# Patient Record
Sex: FEMALE | Race: WHITE | NOT HISPANIC OR LATINO | Employment: OTHER | URBAN - METROPOLITAN AREA
[De-identification: names, ages, dates, MRNs, and addresses within clinical notes are randomized per-mention and may not be internally consistent; named-entity substitution may affect disease eponyms.]

---

## 2017-05-25 ENCOUNTER — OFFICE VISIT (OUTPATIENT)
Dept: OBGYN | Facility: CLINIC | Age: 47
End: 2017-05-25
Payer: COMMERCIAL

## 2017-05-25 VITALS
DIASTOLIC BLOOD PRESSURE: 88 MMHG | SYSTOLIC BLOOD PRESSURE: 139 MMHG | BODY MASS INDEX: 31.01 KG/M2 | WEIGHT: 198 LBS | OXYGEN SATURATION: 98 % | HEART RATE: 80 BPM

## 2017-05-25 DIAGNOSIS — N83.202 CYST OF LEFT OVARY: ICD-10-CM

## 2017-05-25 DIAGNOSIS — R10.2 PELVIC PAIN IN FEMALE: ICD-10-CM

## 2017-05-25 DIAGNOSIS — N64.4 MASTODYNIA: Primary | ICD-10-CM

## 2017-05-25 PROCEDURE — 99214 OFFICE O/P EST MOD 30 MIN: CPT | Performed by: OBSTETRICS & GYNECOLOGY

## 2017-05-25 NOTE — NURSING NOTE
"Chief Complaint   Patient presents with     Breast Pain     left breast pain  2 weeks     Pelvic Pain     for 2 weeks history of ovarian cysts       Initial /88 (BP Location: Right arm, Cuff Size: Adult Regular)  Pulse 80  Wt 198 lb (89.8 kg)  LMP 05/04/2017  SpO2 98%  Breastfeeding? No  BMI 31.01 kg/m2 Estimated body mass index is 31.01 kg/(m^2) as calculated from the following:    Height as of 3/5/16: 5' 7\" (1.702 m).    Weight as of this encounter: 198 lb (89.8 kg).  Medication Reconciliation: complete   PANCHO Braun 5/25/2017         "

## 2017-05-25 NOTE — MR AVS SNAPSHOT
After Visit Summary   5/25/2017    Radha Crockett    MRN: [id number]           Patient Information     Date Of Birth          1970        Visit Information        Provider Department      5/25/2017 11:00 AM Renzo Garsia MD [facility]        Today's Diagnoses     Mastodynia    -  1    Pelvic pain in female        Cyst of left ovary          Care Instructions    Call Ultrasound [phone]      Recheck long appointment with Dr. Garsia          Follow-ups after your visit        Your next 10 appointments already scheduled     May 31, 2017 11:10 AM CDT   MA DIAGNOSTIC DIGITAL BILATERAL with MGMA2, [facility])    [address] [place] [id number] [phone]           Do not use any powder, lotion or deodorant under your arms or on your breast. If you do, we will ask you to remove it before your exam.  Wear comfortable, two-piece clothing.  If you have any allergies, tell your care team.  Bring any previous mammograms from other facilities or have them mailed to the breast center.            May 31, 2017 11:30 AM CDT   US BREAST ALEIDA LIMITED 1-3 QUAD with MGMUS1, MG [facility])    [address] [place] [address] [phone]           Please bring a list of your medicines (including vitamins, minerals and over-the-counter drugs). Also, tell your doctor about any allergies you may have. Wear comfortable clothes and leave your valuables at home.  You do not need to do anything special to prepare for your exam.  Please call the Imaging Department at your exam site with any questions.              Future tests that were ordered for you today     Open Future Orders        Priority Expected Expires Ordered    US Breast Bilateral Limited 1-3 Quadrants Routine  5/25/2018 5/25/2017    US Pelvic Complete with Transvaginal Routine   5/25/2018 5/25/2017    MA Diagnostic Digital Bilateral Routine  5/25/2018 5/25/2017            Who to contact     If you have questions or need follow up information about today's clinic visit or your schedule please contact [facility] directly at [phone].  Normal or non-critical lab and imaging results will be communicated to you by MyChart, letter or phone within 4 business days after the clinic has received the results. If you do not hear from us within 7 days, please contact the clinic through MyChart or phone. If you have a critical or abnormal lab result, we will notify you by phone as soon as possible.  Submit refill requests through [employer] or call your pharmacy and they will forward the refill request to us. Please allow 3 business days for your refill to be completed.          Additional Information About Your Visit        MyChart Information     [employer] gives you secure access to your electronic health record. If you see a primary care provider, you can also send messages to your care team and make appointments. If you have questions, please call your primary care clinic.  If you do not have a primary care provider, please call [phone] and they will assist you.        Care EveryWhere ID     This is your Care EveryWhere ID. This could be used by other organizations to access your [facility] medical records  [id number]        Your Vitals Were     Pulse Last Period Pulse Oximetry Breastfeeding? BMI (Body Mass Index)       80 05/04/2017 98% No 31.01 kg/m2        Blood Pressure from Last 3 Encounters:   05/25/17 139/88   05/21/16 128/82   03/05/16 130/90    Weight from Last 3 Encounters:   05/25/17 198 lb (89.8 kg)   05/21/16 193 lb (87.5 kg)   03/05/16 190 lb (86.2 kg)               Primary Care Provider Office Phone # Fax #    Azam Brito [phone]       [facility] [address] [facility] [address]        Thank you!     Thank you for  choosing [facility]  for your care. Our goal is always to provide you with excellent care. Hearing back from our patients is one way we can continue to improve our services. Please take a few minutes to complete the written survey that you may receive in the mail after your visit with us. Thank you!             Your Updated Medication List - Protect others around you: Learn how to safely use, store and throw away your medicines at www.disposemymeds.org.          This list is accurate as of: 5/25/17 12:12 PM.  Always use your most recent med list.                   Brand Name Dispense Instructions for use    IBUPROFEN PO      Take 200 mg by mouth every 6 hours as needed for moderate pain       melatonin 1 MG Tabs tablet      Take 1 mg by mouth nightly as needed for sleep       tiZANidine 4 MG tablet    ZANAFLEX    30 tablet    Take 1 tablet (4 mg) by mouth 3 times daily as needed for muscle spasms       VITAMIN D3 PO      Take 400 Units by mouth daily

## 2017-05-25 NOTE — PROGRESS NOTES
"Radha Crockett is a 46 year old year old female, G 3 , who presents today with recurrent left breast pain for about 2-3 weeks.  She reports that she has had it off an on for some years.  She isn't sure exactly how long.    Location: the medial lower quadrant.   Quality: it seems to be a shooting pain.    Severity: 6 of 10.   Timing:   Context: she doesn't recall any initiating factors.  She is wondering if it might be related to weight gain.  (9# weight gain). .   Modifying factors: wearing a braw might aggravate it.  .   Associated signs/symptoms: no galactorrhea .  Family history of breast cancer:  None   Previous breast surgery:  None   Previous pelvic surgery:  None   Hormones used:   Currently none, in past she did use OCPs \"long time ago.\"   Nipple discharge:  none  Skin changes:  None   History of breast feeding:  no  Previous mammogram:  2014  Previous ultrasound:  None     She had a mammogram  02/20/2014 and the following mammogram results noted.   Examination: Bilateral digital diagnostic mammography with computer  aided detection and left breast ultrasound  Comparison: None  History: Left breast.  BREAST DENSITY: Heterogeneously dense   Findings: No suspicious finding is identified.   Left breast ultrasound was performed demonstrating normal breast  composition. No suspicious finding.  IMPRESSION: BI-RADS CATEGORY: 1 -  NEGATIVE.  RECOMMENDED FOLLOW-UP:  Annual Mammography  Given the lack of imaging findings, further management of breast pain  should be based on clinical grounds.       She has also had some right sided pelvic pain.   She wakes up in the morning and she has pain, like she is carrying a ball.   In the morning the pain is so painful when she wakes up, it is a 9 of 10.   She states it is a sharp pain.  She feels some heaviness.  This has been going on for at least 3 weeks.    Modifying factors:  None  Context:  The pain is most in the morning and then improves during the day.    Hormones " used:  Currently none, in past she did use OCPs years ago.    Pelvic surgery:  None   Previous ultrasounds have been performed.  She has a history of left sided pain and left ovarian cysts.  She thinks she might have been told she has endometriosis based on pap smear or ultrasound.      US PELVIC COMPLETE WITH TRANSVAGINAL  2016 11:35 AM  HISTORY:  Follow up cyst, Unspecified ovarian cysts  FINDINGS: Ultrasound was performed transvaginally as well as transabdominally in order to optimally evaluate the adnexa.  The uterus measured 7.5 x 3.3 x 4.4 cm with an endometrial thickness of 0.6 cm. No myometrial abnormality was demonstrated.  The right and left ovaries appeared within normal limits. Within the left ovary, the there is a 1.6 cm simple cyst or follicle. There was no free pelvic fluid.  IMPRESSION:  Left ovarian 1.6 cm simple appearing cyst or follicle. Left ovarian complex cyst present on 2015 is not visible on today's exam. Otherwise unremarkable exam.         Past Medical History:   Diagnosis Date     Miscarriage     2 in past       Past Surgical History:   Procedure Laterality Date     NOSE SURGERY  2009    cosmetic reasons       Obstetric History       T0      L0     SAB1   TAB0   Ectopic0   Multiple0   Live Births0       # Outcome Date GA Lbr Ezequiel/2nd Weight Sex Delivery Anes PTL Lv   4 SAB            3 TAB            2 SAB            1 SAB                   Allergies   Allergen Reactions     Clomid [Clomiphene Citrate] Other (See Comments)     Presumed Ovarian hyperstimulation       Current Outpatient Prescriptions   Medication Sig Dispense Refill     melatonin 1 MG TABS Take 1 mg by mouth nightly as needed for sleep       tiZANidine (ZANAFLEX) 4 MG tablet Take 1 tablet (4 mg) by mouth 3 times daily as needed for muscle spasms 30 tablet 1     IBUPROFEN PO Take 200 mg by mouth every 6 hours as needed for moderate pain       Cholecalciferol (VITAMIN D3 PO) Take 400 Units by mouth  daily         Social History     Social History     Marital status:      Spouse name: N/A     Number of children: N/A     Years of education: N/A     Occupational History     Not on file.     Social History Main Topics     Smoking status: Former Smoker     Packs/day: 0.50     Years: 20.00     Types: Cigarettes     Quit date: 7/12/2010     Smokeless tobacco: Former User     Quit date: 12/3/2010     Alcohol use 0.0 oz/week     0 Standard drinks or equivalent per week      Comment: seldom use     Drug use: No     Sexual activity: Yes     Partners: Male     Other Topics Concern     Not on file     Social History Narrative    Lives at home with her .         Family History   Problem Relation Age of Onset     Myocardial Infarction Father      at age 63 years      DIABETES Father      GASTROINTESTINAL DISEASE Mother      GI bleeding, source unknown     Leukemia Paternal Aunt           Review of Symptoms:    10 point ROS of systems including Constitutional, Eyes, Respiratory, Cardiovascular, Gastroenterology, Genitourinary, Integumentary, Muscularskeletal, Psychiatric were all negative except for pertinent positives noted in my HPI and in the PMH.           EXAM:  /88 (BP Location: Right arm, Cuff Size: Adult Regular)  Pulse 80  Wt 198 lb (89.8 kg)  LMP 05/04/2017  SpO2 98%  Breastfeeding? No  BMI 31.01 kg/m2   General Appearance: Pleasant, alert, appropriate appearance for age. No acute distress  Head Exam: Normal. Normocephalic, atraumatic.  Neck Exam: neck supple with no rigidity  Thyroid Exam: No nodules or enlargement., normal to palpation  Chest/Respiratory Exam: Normal chest wall and respirations. Clear to auscultation.  Breast Exam: No dimpling, nipple retraction or discharge. No masses or nodes, normal breast tissue bilaterally.  Tenderness on exam of the left breast with the inner, lower quadrant more tender than the other quadrants, although other quadrants were also tender.    Cardiovascular Exam: Regular rate and rhythm  Gastrointestinal Exam: Soft, nontender, no abnormal masses or organomegaly.  Pelvic Exam:      Vulva: No external lesions, normal hair distribution, no adenopathy      BUS:  Normal, no masses noted      Urethra:  No hypermobility noted      Urethral meatus:  No masses noted      Vagina: Moist, pink, no abnormal discharge, well rugated, no lesions      Cervix: Smooth, pink, no visible lesions      Uterus: Normal size, anteverted, non-tender, mobile      Ovaries: No mass, non-tender, mobile      Perianal:  No masses noted.    Musculoskeletal Exam: Back is straight and non-tender, full ROM of upper and lower extremities.  Skin: no rash or abnormalities  Neurologic Exam: Normal gross motor movement, tone, and coordination. No tremor.  Psychiatric Exam: Alert and oriented, appropriate affect.      ASSESSMENT:  Mastodynia  Pelvic pain   History of ovarian cysts.     PLAN:  The patient and I reviewed her history and past notes and imaging.  She has had these before, although the pelvic pain was previously on the left.   Diagnostic mammogram is ordered.  I reviewed the diagnostic verses screen mammograms.  This diagnostic mammogram will need to be at  [facility]  The ultrasound may be at [place].    RTC for further discussion after the ultrasound.  She might need to get fitted for bras.  She has never had a bra fitting.  This might help alleviate some discomfort (if the correct bra is used).       Renzo Garsia MD  5/25/2017

## 2017-05-31 ENCOUNTER — RADIANT APPOINTMENT (OUTPATIENT)
Dept: ULTRASOUND IMAGING | Facility: CLINIC | Age: 47
End: 2017-05-31
Attending: OBSTETRICS & GYNECOLOGY
Payer: COMMERCIAL

## 2017-05-31 ENCOUNTER — RADIANT APPOINTMENT (OUTPATIENT)
Dept: MAMMOGRAPHY | Facility: CLINIC | Age: 47
End: 2017-05-31
Attending: OBSTETRICS & GYNECOLOGY
Payer: COMMERCIAL

## 2017-05-31 DIAGNOSIS — N64.4 MASTODYNIA: ICD-10-CM

## 2017-05-31 PROCEDURE — 76642 ULTRASOUND BREAST LIMITED: CPT | Mod: LT

## 2017-05-31 PROCEDURE — G0279 TOMOSYNTHESIS, MAMMO: HCPCS

## 2017-05-31 PROCEDURE — G0204 DX MAMMO INCL CAD BI: HCPCS

## 2017-06-07 ENCOUNTER — RADIANT APPOINTMENT (OUTPATIENT)
Dept: ULTRASOUND IMAGING | Facility: CLINIC | Age: 47
End: 2017-06-07
Attending: OBSTETRICS & GYNECOLOGY
Payer: COMMERCIAL

## 2017-06-07 DIAGNOSIS — N83.202 CYST OF LEFT OVARY: ICD-10-CM

## 2017-06-07 DIAGNOSIS — R10.2 PELVIC PAIN IN FEMALE: ICD-10-CM

## 2017-06-07 PROCEDURE — 76856 US EXAM PELVIC COMPLETE: CPT

## 2017-06-07 PROCEDURE — 76830 TRANSVAGINAL US NON-OB: CPT

## 2017-06-14 ENCOUNTER — OFFICE VISIT (OUTPATIENT)
Dept: OBGYN | Facility: CLINIC | Age: 47
End: 2017-06-14
Payer: COMMERCIAL

## 2017-06-14 VITALS
SYSTOLIC BLOOD PRESSURE: 136 MMHG | BODY MASS INDEX: 30.54 KG/M2 | DIASTOLIC BLOOD PRESSURE: 90 MMHG | OXYGEN SATURATION: 97 % | WEIGHT: 195 LBS | HEART RATE: 85 BPM

## 2017-06-14 DIAGNOSIS — N64.4 BREAST PAIN: ICD-10-CM

## 2017-06-14 DIAGNOSIS — N83.202 CYST OF LEFT OVARY: Primary | ICD-10-CM

## 2017-06-14 DIAGNOSIS — R10.2 PELVIC PAIN IN FEMALE: ICD-10-CM

## 2017-06-14 PROCEDURE — 99214 OFFICE O/P EST MOD 30 MIN: CPT | Performed by: OBSTETRICS & GYNECOLOGY

## 2017-06-14 NOTE — PROGRESS NOTES
Radha is a 46 year old  here for follow up of mastodynia, pelvic pain and left ovarian cyst.   She had the left medial lower quadrant, shooting breast pain for about 3 weeks.  In all, she has had this off and on for a few years.  She has not had any galactorrhea.  Her bra might aggravate the symptoms.   She had the mammogram and ultrasound and the result is a category 2, benign findings.      She also had right sided sharp pelvic pain and heaviness, that would wake her in the morning.  She has a history of left sided pelvic pain and left ovarian cysts.  She had an ultrasound after our last discussion and the films and the report have been reviewed.      PELVIC ULTRASOUND 2017 11:30 AM  HISTORY: Pelvic and perineal pain. Unspecified ovarian cyst, left side.  COMPARISON:  2016 pelvic ultrasound.  TECHNIQUE: Transabdominal and endovaginal technique to better visualize endometrial stripe and adnexa.  FINDINGS:  Uterus 7.9 x 4 x 4.9 cm in size with normal-appearing 0.6 cm endometrial stripe. There is a 0.3 cm area of increased echogenicity in the upper uterine body near the endometrial stripe, suggesting small calcification. No fibroids.  Normal right ovary. Simple left ovarian cyst 1.9 x 2.0 x 2.0 cm. Prior ultrasound of 2016 demonstrated a simple 1.6 x 1.4 x 1.3 cm cyst.  IMPRESSION:  2 cm simple left ovarian cyst which is slightly larger than the previous simple cyst seen in 2016. No internal septations or nodules identified. Pelvic ultrasound otherwise within normal limits. Small calcification suspected in the myometrium.      Past Medical History:   Diagnosis Date     Miscarriage     2 in past       Past Surgical History:   Procedure Laterality Date     NOSE SURGERY  2009    cosmetic reasons        Allergies   Allergen Reactions     Clomid [Clomiphene Citrate] Other (See Comments)     Presumed Ovarian hyperstimulation       Current Outpatient Prescriptions   Medication Sig Dispense Refill      melatonin 1 MG TABS Take 1 mg by mouth nightly as needed for sleep       tiZANidine (ZANAFLEX) 4 MG tablet Take 1 tablet (4 mg) by mouth 3 times daily as needed for muscle spasms 30 tablet 1     IBUPROFEN PO Take 200 mg by mouth every 6 hours as needed for moderate pain       Cholecalciferol (VITAMIN D3 PO) Take 400 Units by mouth daily         Social History     Social History     Marital status:      Spouse name: N/A     Number of children: N/A     Years of education: N/A     Occupational History     Not on file.     Social History Main Topics     Smoking status: Former Smoker     Packs/day: 0.50     Years: 20.00     Types: Cigarettes     Quit date: 7/12/2010     Smokeless tobacco: Former User     Quit date: 12/3/2010     Alcohol use 0.0 oz/week     0 Standard drinks or equivalent per week      Comment: seldom use     Drug use: No     Sexual activity: Yes     Partners: Male     Other Topics Concern     Not on file     Social History Narrative    Lives at home with her .         Family History   Problem Relation Age of Onset     Myocardial Infarction Father      at age 63 years      DIABETES Father      GASTROINTESTINAL DISEASE Mother      GI bleeding, source unknown     Leukemia Paternal Aunt        Review of Systems:  10 point ROS of systems including Constitutional, Eyes, Respiratory, Cardiovascular, Gastroenterology, Genitourinary, Integumentary, Muscularskeletal, Psychiatric were all negative except for pertinent positives noted in my HPI and in the PMH.      Exam  /90 (BP Location: Right arm, Cuff Size: Adult Large)  Pulse 85  Wt 195 lb (88.5 kg)  LMP 05/04/2017  SpO2 97%  BMI 30.54 kg/m2  General:  WNWD female, NAD  Alert  Oriented x 3  Gait:  Normal  Skin:  Normal skin turgor  HEENT:  NC/AT, EOMI  Lungs:  Good respiratory effort   Pelvic exam:  Not performed   Extremities:  No clubbing, no cyanosis and no edema.      Assessment  Breast pain, resolved  Pelvic pain, resolved.    Benign simple left ovarian cyst.       Plan  If the pain reoccurs, she will need to let me know, and possibly return to clinic for further evaluation.   The patient is currently not having symptoms.  Simple cysts are largely considered benign.   At this time the Radiology recommendations reviewed.  Generally, these recommendations for a simple cyst are the following:  <5 cm, no follow up  5-7 cm, repeat imaging in 1 year  >7 cm, MRI  She is comfortable with the plan.    Questions seemed to be answered.     TT 25-30 min  CT greater than 60%    Renzo Garsia MD

## 2017-06-14 NOTE — NURSING NOTE
"Chief Complaint   Patient presents with     RECHECK     follow up after US and mammogram       Initial /90 (BP Location: Right arm, Cuff Size: Adult Large)  Pulse 85  Wt 195 lb (88.5 kg)  LMP 05/04/2017  SpO2 97%  BMI 30.54 kg/m2 Estimated body mass index is 30.54 kg/(m^2) as calculated from the following:    Height as of 3/5/16: 5' 7\" (1.702 m).    Weight as of this encounter: 195 lb (88.5 kg).  Medication Reconciliation: lorna Braun MA 6/14/2017         "

## 2017-06-19 ENCOUNTER — DOCUMENTATION ONLY (OUTPATIENT)
Dept: LAB | Facility: CLINIC | Age: 47
End: 2017-06-19

## 2017-06-19 DIAGNOSIS — Z13.220 LIPID SCREENING: ICD-10-CM

## 2017-06-19 DIAGNOSIS — Z83.3 FAMILY HISTORY OF DIABETES MELLITUS: ICD-10-CM

## 2017-06-19 DIAGNOSIS — Z83.3 FAMILY HISTORY OF DIABETES MELLITUS: Primary | ICD-10-CM

## 2017-06-19 LAB
CHOLEST SERPL-MCNC: 200 MG/DL
GLUCOSE SERPL-MCNC: 93 MG/DL (ref 70–99)
HDLC SERPL-MCNC: 54 MG/DL
LDLC SERPL CALC-MCNC: 130 MG/DL
NONHDLC SERPL-MCNC: 146 MG/DL
TRIGL SERPL-MCNC: 82 MG/DL

## 2017-06-19 PROCEDURE — 80061 LIPID PANEL: CPT | Performed by: OBSTETRICS & GYNECOLOGY

## 2017-06-19 PROCEDURE — 82947 ASSAY GLUCOSE BLOOD QUANT: CPT | Performed by: OBSTETRICS & GYNECOLOGY

## 2017-06-19 PROCEDURE — 36415 COLL VENOUS BLD VENIPUNCTURE: CPT | Performed by: OBSTETRICS & GYNECOLOGY

## 2017-06-19 NOTE — PROGRESS NOTES
I called patient to see which labs were to be drawn.   She has a family history of diabetes, so it might be the glucose testing.    Unsure on cholesterol  Renzo Garsia MD

## 2017-06-19 NOTE — PROGRESS NOTES
Radha is coming in for labs today 06/19/2017 per Sun, however, there are no orders placed in EPIC. Please place orders that are needed for today's visit. Thank you, FLAKO.

## 2017-10-13 ENCOUNTER — OFFICE VISIT (OUTPATIENT)
Dept: FAMILY MEDICINE | Facility: CLINIC | Age: 47
End: 2017-10-13
Payer: COMMERCIAL

## 2017-10-13 ENCOUNTER — RADIANT APPOINTMENT (OUTPATIENT)
Dept: GENERAL RADIOLOGY | Facility: CLINIC | Age: 47
End: 2017-10-13
Attending: FAMILY MEDICINE
Payer: COMMERCIAL

## 2017-10-13 VITALS
OXYGEN SATURATION: 100 % | TEMPERATURE: 97 F | SYSTOLIC BLOOD PRESSURE: 124 MMHG | DIASTOLIC BLOOD PRESSURE: 64 MMHG | HEART RATE: 78 BPM

## 2017-10-13 DIAGNOSIS — M79.672 LEFT FOOT PAIN: ICD-10-CM

## 2017-10-13 DIAGNOSIS — M25.473 ANKLE SWELLING, UNSPECIFIED LATERALITY: Primary | ICD-10-CM

## 2017-10-13 DIAGNOSIS — G89.29 CHRONIC LOW BACK PAIN, UNSPECIFIED BACK PAIN LATERALITY, WITH SCIATICA PRESENCE UNSPECIFIED: ICD-10-CM

## 2017-10-13 DIAGNOSIS — M54.5 CHRONIC LOW BACK PAIN, UNSPECIFIED BACK PAIN LATERALITY, WITH SCIATICA PRESENCE UNSPECIFIED: ICD-10-CM

## 2017-10-13 DIAGNOSIS — E78.5 HYPERLIPIDEMIA LDL GOAL <130: ICD-10-CM

## 2017-10-13 PROCEDURE — 73630 X-RAY EXAM OF FOOT: CPT | Mod: LT

## 2017-10-13 PROCEDURE — 99214 OFFICE O/P EST MOD 30 MIN: CPT | Performed by: FAMILY MEDICINE

## 2017-10-13 RX ORDER — FUROSEMIDE 20 MG
20 TABLET ORAL 2 TIMES DAILY
Qty: 15 TABLET | Refills: 1 | Status: SHIPPED | OUTPATIENT
Start: 2017-10-13 | End: 2019-03-25

## 2017-10-13 ASSESSMENT — PAIN SCALES - GENERAL: PAINLEVEL: EXTREME PAIN (8)

## 2017-10-13 NOTE — PROGRESS NOTES
SUBJECTIVE:   Radha Crockett is a 47 year old female who presents to clinic today for the following health issues:      Concern - Swelling in ankles   Onset: x10 days     Description:   Swelling in both ankles and very painful    Intensity: moderate, severe    Progression of Symptoms:  worsening    Accompanying Signs & Symptoms:  None    Previous history of similar problem:   One time on the way back from [facility] after a plane ride     Precipitating factors:   Worsened by: Walking on them    Alleviating factors:  Improved by: Sleep    Therapies Tried and outcome: None    Lab Results:  Would like to go over blood test results from 3 months ago.       Cholesterol 200 (H) <200 mg/dL Final 06/19/2017 11:47 AM [facility]   Comment:   Desirable:       <200 mg/dl   Triglycerides 82  <150 mg/dL Final 06/19/2017 11:47 AM [facility]   Comment:   Fasting specimen   HDL Cholesterol 54  >49 mg/dL Final 06/19/2017 11:47 AM MG   LDL Cholesterol Calculated 130 (H) <100 mg/dL Final 06/19/2017 11:47 AM [facility]   Comment:   Above desirable:  100-129 mg/dl    Borderline High:  130-159 mg/dL    High:             160-189 mg/dL    Very high:       >189 mg/dl      Non HDL Cholesterol 146 (H) <130 mg/dL Final 06/19/2017 11:47 AM MG   Comment:   Above Desirable:  130-159 mg/dl    Borderline high:  160-189 mg/dl    High:             190-219 mg/dl    Very high:       >219 mg/dl      Glucose 93  70 - 99 mg/dL Final 06/19/2017 11:47 AM     Back Pain       Description:   Location of pain:  bilateral  Character of pain: dull ache and waxing and waning  Pain radiation: Does not radiate  Since last visit, pain is:  unchanged  New numbness or weakness in legs, not attributed to pain:  no     Intensity: Currently moderate    History:   Pain interferes with job: No  Therapies tried without relief: NSAIDs  Therapies tried with relief: muscle relaxants       Accompanying Signs & Symptoms:  Risk of Fracture:  None  Risk of Cauda Equina:  None  Risk of Infection:  None  Risk of  Cancer:  None    Problem list and histories reviewed & adjusted, as indicated.  Additional history: as documented    Patient Active Problem List   Diagnosis     Recurrent miscarriages     CARDIOVASCULAR SCREENING; LDL GOAL LESS THAN 160     Ovarian cyst     Bilateral lower extremity edema     Bloating     Past Surgical History:   Procedure Laterality Date     NOSE SURGERY  12/2009    cosmetic reasons       Social History   Substance Use Topics     Smoking status: Former Smoker     Packs/day: 0.50     Years: 20.00     Types: Cigarettes     Quit date: 7/12/2010     Smokeless tobacco: Former User     Quit date: 12/3/2010     Alcohol use 0.0 oz/week     0 Standard drinks or equivalent per week      Comment: seldom use     Family History   Problem Relation Age of Onset     Myocardial Infarction Father      at age 63 years      DIABETES Father      GASTROINTESTINAL DISEASE Mother      GI bleeding, source unknown     Leukemia Paternal Aunt          Reviewed and updated as needed this visit by Provider    ROS:  Constitutional, HEENT, pulmonary, gi and gu systems are negative, except as otherwise noted.    OBJECTIVE:   /64  Pulse 78  Temp 97  F (36.1  C) (Oral)  SpO2 100%  There is no height or weight on file to calculate BMI.  GENERAL: healthy, alert and no distress  NECK: no adenopathy and thyroid normal to palpation  RESP: lungs clear to auscultation - no rales, rhonchi or wheezes  CV: regular rate and rhythm, no murmur, click or rub, no peripheral edema  ABDOMEN: soft, nontender, no masses and bowel sounds normal  MS:   Leg leg   Moderate ankle swelling       Diagnostic Test Results:     FOOT LEFT THREE OR MORE VIEWS  10/13/2017 3:17 PM      HISTORY: Pain in left foot     COMPARISON: None         IMPRESSION: There is dorsal soft tissue swelling. No evidence of  fracture or osseous lesion.     ASSESSMENT/PLAN:     (M25.473) Ankle swelling, unspecified laterality  (primary encounter diagnosis)  Comment:  Differential: Edema, Sprain. Without injury and worsening at end of the day likely edema. Discussed elevation as much as possible and diuresis.  Plan: furosemide (LASIX) 20 MG table    (M79.672) Left foot pain  Comment: X ray does not show any acute bony abnormality.  Plan: XR Foot Left G/E 3 Views    (M54.5,  G89.29) Chronic low back pain, unspecified back pain laterality, with sciatica presence unspecified  Comment: On going, muscle relaxant do help to some degree. Discussed doing PT  Plan: [facility] REFERRAL,         tiZANidine (ZANAFLEX) 4 MG tablet    (E78.5) Hyperlipidemia LDL goal <130  Comment: Discussed the Glucose and Cholesterol results from 6/2017 and [id number] ASCVD risk score (Be ABRAHAN Jr, et al., 2013) is: 0.9%   Plan: Healthy lifestyle    Follow up in 2 weeks or sooner with concerns    Azam Brito MD  [facility]

## 2017-10-13 NOTE — MR AVS SNAPSHOT
After Visit Summary   10/13/2017    Radha Crockett    MRN: [id number]           Patient Information     Date Of Birth          1970        Visit Information        Provider Department      10/13/2017 2:40 PM Azam Brito MD [facility]        Today's Diagnoses     Ankle swelling, unspecified laterality    -  1    Left foot pain        Chronic low back pain, unspecified back pain laterality, with sciatica presence unspecified        Hyperlipidemia LDL goal <130          Care Instructions    [facility]    If you have any questions regarding to your visit please contact your care team:       Team Purple:   Clinic Hours Telephone Number   Dr. Evita Alvares     7am-7pm  Monday - Thursday   7am-5pm  Fridays  [phone]  (Appointment scheduling available 24/7)    Questions about your Visit?   Team Line:  [phone]   [facility] [place] [facility] 11am-9pm Monday-Friday Saturday-Sunday- 9am-5pm   [place] - 5pm-9pm Monday-Friday Saturday-Sunday- 9am-5pm  [phone] - [facility]  [phone] - [place]       What options do I have for visits at the clinic other than the traditional office visit?  To expand how we care for you, many of our providers are utilizing electronic visits (e-visits) and telephone visits, when medically appropriate, for interactions with their patients rather than a visit in the clinic.   We also offer nurse visits for many medical concerns. Just like any other service, we will bill your insurance company for this type of visit based on time spent on the phone with your provider. Not all insurance companies cover these visits. Please check with your medical insurance if this type of visit is covered. You will be responsible for any charges that are not paid by your insurance.      E-visits via [employer]:  generally incur a $35.00 fee.  Telephone visits:  Time spent on the  phone: *charged based on time that is spent on the phone in increments of 10 minutes. Estimated cost:   5-10 mins $30.00   11-20 mins. $59.00   21-30 mins. $85.00     Use [employer] (secure email communication and access to your chart) to send your primary care provider a message or make an appointment. Ask someone on your Team how to sign up for [employer].  For a Price Quote for your services, please call our Consumer Price Line at [phone].  As always, Thank you for trusting us with your health care needs!    Discharge by PANCHO TAVARES             Follow-ups after your visit        Additional Services     [facility] PT, HAND, AND CHIROPRACTIC REFERRAL       === This order will print in the [facility] Scheduling  Office ===    Physical therapy, hand therapy and chiropractic care are available through:    [facility]  [facility]  [facility]    Call one easy number to schedule at any of the above locations:  [phone].    Your provider has referred you to Physical Therapy at [facility] or [facility]    Indication/Reason for Referral: Low Back Pain  Onset of Illness:  On going worse in last 1 month  Therapy Orders:  Evaluate and Treat  Special Programs:  None  Special Request:  None    Additional Comments for the therapist or chiropractor:  None    Please be aware that coverage of these services is subject to the terms and limitations of your health insurance plan.  Call member services at your health plan with any benefit or coverage questions.      Please bring the following to your appointment:    >>   Your personal calendar for scheduling future appointments  >>   Comfortable clothing                  Who to contact     If you have questions or need follow up information about today's clinic visit or your schedule please contact [facility] [place] directly at [phone].  Normal or non-critical lab and imaging results will be communicated to you by MyChart, letter or phone within 4  business days after the clinic has received the results. If you do not hear from us within 7 days, please contact the clinic through [employer] or phone. If you have a critical or abnormal lab result, we will notify you by phone as soon as possible.  Submit refill requests through [employer] or call your pharmacy and they will forward the refill request to us. Please allow 3 business days for your refill to be completed.          Additional Information About Your Visit        [employer] Information     [employer] gives you secure access to your electronic health record. If you see a primary care provider, you can also send messages to your care team and make appointments. If you have questions, please call your primary care clinic.  If you do not have a primary care provider, please call [phone] and they will assist you.        Care EveryWhere ID     This is your Care EveryWhere ID. This could be used by other organizations to access your [place] medical records  [id number]        Your Vitals Were     Pulse Temperature Pulse Oximetry             78 97  F (36.1  C) (Oral) 100%          Blood Pressure from Last 3 Encounters:   10/13/17 124/64   06/14/17 136/90   05/25/17 139/88    Weight from Last 3 Encounters:   06/14/17 195 lb (88.5 kg)   05/25/17 198 lb (89.8 kg)   05/21/16 193 lb (87.5 kg)              We Performed the Following     [facility] PT, HAND, AND CHIROPRACTIC REFERRAL          Today's Medication Changes          These changes are accurate as of: 10/13/17  3:36 PM.  If you have any questions, ask your nurse or doctor.               Start taking these medicines.        Dose/Directions    furosemide 20 MG tablet   Commonly known as:  LASIX   Used for:  Ankle swelling, unspecified laterality   Started by:  Azam Brito MD        Dose:  20 mg   Take 1 tablet (20 mg) by mouth 2 times daily   Quantity:  15 tablet   Refills:  1            Where to get your medicines      These medications were sent to [facility] [id number]  [place] - [address] AT [facility] [address]  [address] [facility], [place] [address]     Phone:  [phone]     furosemide 20 MG tablet    tiZANidine 4 MG tablet                Primary Care Provider Office Phone # Fax #    Azam Brito [phone] [address] [facility]  [place] [address]        Equal Access to Services     GABRIEL GALVEZ : Hadii aad ku hadasho Soomaali, waaxda luqadaha, qaybta kaalmada adeegyada, waxay idiin hayaan adeeg kharash lashamar . So [facility] [phone].    ATENCIÓN: Si habla español, tiene a gale disposición servicios gratuitos de asistencia lingüística. [facility] [phone].    We comply with applicable federal civil rights laws and [place] laws. We do not discriminate on the basis of race, color, national origin, age, disability, sex, sexual orientation, or gender identity.            Thank you!     Thank you for choosing [facility]  for your care. Our goal is always to provide you with excellent care. Hearing back from our patients is one way we can continue to improve our services. Please take a few minutes to complete the written survey that you may receive in the mail after your visit with us. Thank you!             Your Updated Medication List - Protect others around you: Learn how to safely use, store and throw away your medicines at www.disposemymeds.org.          This list is accurate as of: 10/13/17  3:36 PM.  Always use your most recent med list.                   Brand Name Dispense Instructions for use Diagnosis    furosemide 20 MG tablet    LASIX    15 tablet    Take 1 tablet (20 mg) by mouth 2 times daily    Ankle swelling, unspecified laterality       IBUPROFEN PO      Take 200 mg by mouth every 6 hours as needed for moderate pain        melatonin 1 MG Tabs tablet      Take 1 mg by mouth nightly as needed for sleep    Persistent insomnia       tiZANidine 4 MG tablet    ZANAFLEX     30 tablet    Take 1 tablet (4 mg) by mouth 3 times daily as needed for muscle spasms    Chronic low back pain, unspecified back pain laterality, with sciatica presence unspecified       VITAMIN D3 PO      Take 400 Units by mouth daily

## 2017-10-13 NOTE — PATIENT INSTRUCTIONS
[facility]    If you have any questions regarding to your visit please contact your care team:       Team Purple:   Clinic Hours Telephone Number   Dr. Evita Alvares     7am-7pm  Monday - Thursday   7am-5pm  Fridays  [phone]  (Appointment scheduling available 24/7)    Questions about your Visit?   Team Line:  [phone]   Urgent Care - [place] and [facility] - 11am-9pm Monday-Friday Saturday-Sunday- 9am-5pm   [place] - 5pm-9pm Monday-Friday Saturday-Sunday- 9am-5pm  [phone] - [facility]  [phone] - [place]       What options do I have for visits at the clinic other than the traditional office visit?  To expand how we care for you, many of our providers are utilizing electronic visits (e-visits) and telephone visits, when medically appropriate, for interactions with their patients rather than a visit in the clinic.   We also offer nurse visits for many medical concerns. Just like any other service, we will bill your insurance company for this type of visit based on time spent on the phone with your provider. Not all insurance companies cover these visits. Please check with your medical insurance if this type of visit is covered. You will be responsible for any charges that are not paid by your insurance.      E-visits via [employer]:  generally incur a $35.00 fee.  Telephone visits:  Time spent on the phone: *charged based on time that is spent on the phone in increments of 10 minutes. Estimated cost:   5-10 mins $30.00   11-20 mins. $59.00   21-30 mins. $85.00     Use Smart[employer]uet (secure email communication and access to your chart) to send your primary care provider a message or make an appointment. Ask someone on your Team how to sign up for [employer].  For a Price Quote for your services, please call our Consumer Price Line at [phone].  As always, Thank you for trusting us with your health care needs!    Discharge by PANCHO TAVARES

## 2017-10-13 NOTE — NURSING NOTE
"Chief Complaint   Patient presents with     Swelling       Initial /64  Pulse 78  Temp 97  F (36.1  C) (Oral)  SpO2 100% Estimated body mass index is 30.54 kg/(m^2) as calculated from the following:    Height as of 3/5/16: 5' 7\" (1.702 m).    Weight as of 6/14/17: 195 lb (88.5 kg).  Medication Reconciliation: complete     Azalea Rojas MA       "

## 2017-10-23 ENCOUNTER — TELEPHONE (OUTPATIENT)
Dept: FAMILY MEDICINE | Facility: CLINIC | Age: 47
End: 2017-10-23

## 2017-10-23 NOTE — TELEPHONE ENCOUNTER
Reason for Call:  Other call back    Detailed comments:  Patient calling. Her left foot is still swollen and painful even after taking the medication prescribed last week. Please call and advise.     Phone Number Patient can be reached at: Home number on file [phone] (home)    Best Time:  Any     Can we leave a detailed message on this number? YES    Call taken on 10/23/2017 at 11:05 AM by Lisa Funes

## 2017-10-23 NOTE — TELEPHONE ENCOUNTER
Patient was seen on 10/13/17 for ankle pain and swelling, patient was given Lasix 20mg.  Patient calling stating her foot remains swollen and painful even with the medication.    Please advise   Trina Koch RN

## 2017-10-23 NOTE — TELEPHONE ENCOUNTER
Reason for Call:  Call back regarding left foot pain, states she cannot walk on it.     Detailed comments: Please call soon to advise. Thank you.    Phone Number Patient can be reached at: Home number on file [phone] (home)    Best Time: soon    Can we leave a detailed message on this number? Not Applicable    Call taken on 10/23/2017 at 4:07 PM by Laverne Blair

## 2017-10-24 NOTE — TELEPHONE ENCOUNTER
Asked her to see one of my colleagues for re evaluation.  She might need a boot or evaluation by ortho or podiatrist  Has appointment with my colleague on Thursday

## 2017-10-24 NOTE — TELEPHONE ENCOUNTER
Provider spoke with patient.  Message left for patient with provider message.   Trina Koch RN     Out of season

## 2017-10-26 ENCOUNTER — TELEPHONE (OUTPATIENT)
Dept: OTHER | Facility: CLINIC | Age: 47
End: 2017-10-26

## 2017-10-26 ENCOUNTER — OFFICE VISIT (OUTPATIENT)
Dept: FAMILY MEDICINE | Facility: CLINIC | Age: 47
End: 2017-10-26
Payer: COMMERCIAL

## 2017-10-26 VITALS
HEART RATE: 93 BPM | DIASTOLIC BLOOD PRESSURE: 74 MMHG | SYSTOLIC BLOOD PRESSURE: 132 MMHG | TEMPERATURE: 97.3 F | OXYGEN SATURATION: 98 %

## 2017-10-26 DIAGNOSIS — I87.2 VENOUS (PERIPHERAL) INSUFFICIENCY: Primary | ICD-10-CM

## 2017-10-26 DIAGNOSIS — M25.572 PAIN IN JOINT, ANKLE AND FOOT, LEFT: ICD-10-CM

## 2017-10-26 DIAGNOSIS — R60.0 BILATERAL LEG EDEMA: ICD-10-CM

## 2017-10-26 LAB
ALBUMIN SERPL-MCNC: 3.8 G/DL (ref 3.4–5)
ALP SERPL-CCNC: 101 U/L (ref 40–150)
ALT SERPL W P-5'-P-CCNC: 39 U/L (ref 0–50)
ANION GAP SERPL CALCULATED.3IONS-SCNC: 5 MMOL/L (ref 3–14)
AST SERPL W P-5'-P-CCNC: 22 U/L (ref 0–45)
BILIRUB SERPL-MCNC: 0.4 MG/DL (ref 0.2–1.3)
BUN SERPL-MCNC: 20 MG/DL (ref 7–30)
CALCIUM SERPL-MCNC: 9.2 MG/DL (ref 8.5–10.1)
CHLORIDE SERPL-SCNC: 103 MMOL/L (ref 94–109)
CO2 SERPL-SCNC: 29 MMOL/L (ref 20–32)
CREAT SERPL-MCNC: 0.79 MG/DL (ref 0.52–1.04)
CREAT UR-MCNC: 140 MG/DL
GFR SERPL CREATININE-BSD FRML MDRD: 78 ML/MIN/1.7M2
GLUCOSE SERPL-MCNC: 102 MG/DL (ref 70–99)
MICROALBUMIN UR-MCNC: 6 MG/L
MICROALBUMIN/CREAT UR: 3.96 MG/G CR (ref 0–25)
POTASSIUM SERPL-SCNC: 4.5 MMOL/L (ref 3.4–5.3)
PROT SERPL-MCNC: 7.7 G/DL (ref 6.8–8.8)
SODIUM SERPL-SCNC: 137 MMOL/L (ref 133–144)

## 2017-10-26 PROCEDURE — 36415 COLL VENOUS BLD VENIPUNCTURE: CPT | Performed by: FAMILY MEDICINE

## 2017-10-26 PROCEDURE — 80053 COMPREHEN METABOLIC PANEL: CPT | Performed by: FAMILY MEDICINE

## 2017-10-26 PROCEDURE — 82043 UR ALBUMIN QUANTITATIVE: CPT | Performed by: FAMILY MEDICINE

## 2017-10-26 PROCEDURE — 99213 OFFICE O/P EST LOW 20 MIN: CPT | Performed by: FAMILY MEDICINE

## 2017-10-26 RX ORDER — MELOXICAM 15 MG/1
15 TABLET ORAL DAILY
Qty: 30 TABLET | Refills: 1 | Status: SHIPPED | OUTPATIENT
Start: 2017-10-26 | End: 2019-03-25

## 2017-10-26 NOTE — TELEPHONE ENCOUNTER
Pt referred to [facility] by Dr. Key, Den Adams, foot swelling for Venous (peripheral) insufficiency, foot swelling    Pt needs to be scheduled for consult with vascular medicine . Will send to  to coordinate an appointment next available (pt lives in [place]) .   Remigio France, RN, BSN

## 2017-10-26 NOTE — LETTER
[facility]  [address] [place] [address]    October 30, 2017    Radha Crockett  [address]   [place] [address]          Dear Radha,    Your liver function, kidney function, sodium, potassium, calcium are all normal.  There's no protein in your urine, so your kidneys aren't the cause of your leg swelling.  Please go for the ultrasound of your legs to make sure you don't have clots.  Also, please be sure to see the vascular surgeon for further evaluation.  Please remember to get regular exercise, as this will help with the swelling.  Don't hesitate to contact me if you have any questions.     Regards,    Enclosed is a copy of your results.     Results for orders placed or performed in visit on 10/26/17   Comprehensive metabolic panel   Result Value Ref Range    Sodium 137 133 - 144 mmol/L    Potassium 4.5 3.4 - 5.3 mmol/L    Chloride 103 94 - 109 mmol/L    Carbon Dioxide 29 20 - 32 mmol/L    Anion Gap 5 3 - 14 mmol/L    Glucose 102 (H) 70 - 99 mg/dL    Urea Nitrogen 20 7 - 30 mg/dL    Creatinine 0.79 0.52 - 1.04 mg/dL    GFR Estimate 78 >60 mL/min/1.7m2    GFR Estimate If Black >90 >60 mL/min/1.7m2    Calcium 9.2 8.5 - 10.1 mg/dL    Bilirubin Total 0.4 0.2 - 1.3 mg/dL    Albumin 3.8 3.4 - 5.0 g/dL    Protein Total 7.7 6.8 - 8.8 g/dL    Alkaline Phosphatase 101 40 - 150 U/L    ALT 39 0 - 50 U/L    AST 22 0 - 45 U/L   Albumin Random Urine Quantitative with Creat Ratio   Result Value Ref Range    Creatinine Urine 140 mg/dL    Albumin Urine mg/L 6 mg/L    Albumin Urine mg/g Cr 3.96 0 - 25 mg/g Cr       If you have any questions or concerns, please call myself or my nurse at [phone].      Sincerely,        Den Key MD/CATHY

## 2017-10-26 NOTE — TELEPHONE ENCOUNTER
Called and left a message for the patient to call us back to schedule a consult appointment with vascular medicine.

## 2017-10-26 NOTE — MR AVS SNAPSHOT
After Visit Summary   10/26/2017    Radha Crockett    MRN: [id number]           Patient Information     Date Of Birth          1970        Visit Information        Provider Department      10/26/2017 11:00 AM Den Key MD [facility]        Today's Diagnoses     Venous (peripheral) insufficiency    -  1    Need for prophylactic vaccination and inoculation against influenza        Bilateral leg edema        Pain in joint, ankle and foot, left          Care Instructions      Understanding Chronic Venous Insufficiency  Problems with the veins in the legs may lead to chronic venous insufficiency (CVI). CVI means that there is a long-term problem with the veins not being able to pump blood back to your heart. When this happens, blood stays in the legs and causes swelling and aching.   Two problems that may lead to chronic venous insufficiency are:    Damaged valves. Valves keep blood flowing from the legs through the blood vessels and back to the heart. When the valves are damaged, blood does not flow as well.     Deep vein thrombosis (DVT). Blood clots may form in the deep veins of the legs. This may cause pain, redness, and swelling in the legs. It may also block the flow of blood back to the heart. Seek immediate medical care if you have these symptoms.    A blood clot in the leg can also break off and travel to the lungs. This is called pulmonary embolism (PE). In the lungs, the clot can cut off the flow of blood. This may cause chest pain, trouble breathing, sweating, a fast heartbeat, coughing (may cough up blood), and fainting. It is a medical emergency and may cause death. Call 911 if you have these symptoms.    Healthcare providers call the two conditions, DVT and PE, venous thromboembolism (VTE).  CVI can t be cured, but you can control leg swelling to reduce the likelihood of ulcers (sores).  Recognizing the symptoms  Be aware of the following:    If you  stand or sit with your feet down for long periods, your legs may ache or feel heavy.    Swollen ankles are possibly the most common symptom of CVI.    As swelling increases, the skin over your ankles may show red spots or a brownish tinge. The skin may feel leathery or scaly, and may start to itch.    If swelling is not controlled, an ulcer (open wound) may form.  What you can do  Reduce your risk of developing ulcers by doing the following:    Increase blood flow back to your heart by elevating your legs, exercising daily, and wearing elastic stockings.    Boost blood flow in your legs by losing excess weight.    If you must stand or sit in one place for a period of time, keep your blood moving by wiggling your toes, shifting your body position, and rising up on the balls of your feet.    Date Last Reviewed: 5/1/2016 2000-2017 [employer]. [address] [place] [address]. All rights reserved. This information is not intended as a substitute for professional medical care. Always follow your healthcare professional's instructions.      Radha,    Here are the topics discussed today:    1. We will check your kidney function and electrolytes today to make sure your kidneys are working well.  I will notify you of the results.    2.  We discussed venous insufficiency as a possible cause for your leg swelling.  Please wear compression stalkings at night, please get regular exercise (cardiovascular) for 30mins per day to help with swelling. Please take meloxicam for pain prior to exercising.  I ordered a leg ultrasound to make sure you don't have blood clots.  I placed a referral to vascular surgery for further evaluation and treatment.    [facility]    If you have any questions regarding to your visit please contact your care team:       Team Purple:   Clinic Hours Telephone Number   Dr. Evita Beckwith   7am-7pm  Monday -  Thursday   7am-5pm  Fridays  [phone]  (Appointment scheduling available 24/7)    Questions about your Visit?   Team Line:  [phone]   Urgent Care - [place] - 11am-9pm Monday-Friday Saturday-Sunday- 9am-5pm   [place] - 5pm-9pm Monday-Friday Saturday-Sunday- 9am-5pm  [phone] - [place]   [phone] - [place]       What options do I have for visits at the clinic other than the traditional office visit?  To expand how we care for you, many of our providers are utilizing electronic visits (e-visits) and telephone visits, when medically appropriate, for interactions with their patients rather than a visit in the clinic.   We also offer nurse visits for many medical concerns. Just like any other service, we will bill your insurance company for this type of visit based on time spent on the phone with your provider. Not all insurance companies cover these visits. Please check with your medical insurance if this type of visit is covered. You will be responsible for any charges that are not paid by your insurance.      E-visits via SynGenhart:  generally incur a $35.00 fee.  Telephone visits:  Time spent on the phone: *charged based on time that is spent on the phone in increments of 10 minutes. Estimated cost:   5-10 mins $30.00   11-20 mins. $59.00   21-30 mins. $85.00     Use Syn[employer]hart (secure email communication and access to your chart) to send your primary care provider a message or make an appointment. Ask someone on your Team how to sign up for [facility].  For a Price Quote for your services, please call our Consumer Price Line at [phone].  As always, Thank you for trusting us with your health care needs!    Discharge by PANCHO TAVARES             Follow-ups after your visit        Additional Services     VASCULAR REFERRAL       Your provider has referred you to the [facility].    Reason for Referral: Vascular Medicine    Urgency of Referral: Next  available    A referral has been sent to our Vascular  Services. If you do not receive a call from them within 24-48 hours you may reach them at [phone].    Please be aware that coverage of these services is subject to the terms and limitations of your health insurance plan.  Call member services at your health plan with any benefit or coverage questions.      Please bring the following with you to your appointment:  (1) Any X-Rays, CTs or MRIs which have been performed.  Contact the facility where they were done to arrange for  prior to your scheduled appointment.  Any new CT, MRI or other procedures ordered by your specialist must be performed at a [place] facility or coordinated by your clinic's referral office.    (2) List of current medications   (3) This referral request   (4) Any documents/labs given to you for this referral                  Follow-up notes from your care team     Return in about 3 months (around 1/26/2018) for Leg swelling.      Future tests that were ordered for you today     Open Future Orders        Priority Expected Expires Ordered    US Lower Extremity Venous Duplex Bilateral Routine  10/26/2018 10/26/2017            Who to contact     If you have questions or need follow up information about today's clinic visit or your schedule please contact [facility] directly at [phone].  Normal or non-critical lab and imaging results will be communicated to you by MyChart, letter or phone within 4 business days after the clinic has received the results. If you do not hear from us within 7 days, please contact the clinic through MyChart or phone. If you have a critical or abnormal lab result, we will notify you by phone as soon as possible.  Submit refill requests through [facility] or call your pharmacy and they will forward the refill request to us. Please allow 3 business days for your refill to be completed.          Additional Information About Your  Visit        FINDING ROVERhar Information     [employer] gives you secure access to your electronic health record. If you see a primary care provider, you can also send messages to your care team and make appointments. If you have questions, please call your primary care clinic.  If you do not have a primary care provider, please call [phone] and they will assist you.        Care EveryWhere ID     This is your Care EveryWhere ID. This could be used by other organizations to access your [facility] medical records  [id number]        Your Vitals Were     Pulse Temperature Pulse Oximetry             93 97.3  F (36.3  C) (Oral) 98%          Blood Pressure from Last 3 Encounters:   10/26/17 132/74   10/13/17 124/64   06/14/17 136/90    Weight from Last 3 Encounters:   06/14/17 195 lb (88.5 kg)   05/25/17 198 lb (89.8 kg)   05/21/16 193 lb (87.5 kg)              We Performed the Following     Albumin Random Urine Quantitative with Creat Ratio     Comprehensive metabolic panel     VASCULAR REFERRAL          Today's Medication Changes          These changes are accurate as of: 10/26/17 11:58 AM.  If you have any questions, ask your nurse or doctor.               Start taking these medicines.        Dose/Directions    meloxicam 15 MG tablet   Commonly known as:  MOBIC   Used for:  Pain in joint, ankle and foot, left   Started by:  Den Key MD        Dose:  15 mg   Take 1 tablet (15 mg) by mouth daily   Quantity:  30 tablet   Refills:  1       order for DME   Used for:  Venous (peripheral) insufficiency, Bilateral leg edema   Started by:  Den Key MD        Compression stalkings to wear at night.   Quantity:  1 each   Refills:  0            Where to get your medicines      These medications were sent to [id number] - [place] - [address] AT [facility] & [address]  [address] [place] [address]     Phone:  [phone]      meloxicam 15 MG tablet         Some of these will need a paper prescription and others can be bought over the counter.  Ask your nurse if you have questions.     Bring a paper prescription for each of these medications     order for DME                Primary Care Provider Office Phone # Fax #    Azam Brito [phone] [address] [facility] [address]        Equal Access to Services     [facility] CenterLEANNE : Hadii aad ku hadasho Soomaali, waaxda luqadaha, qaybta kaalmada adeegyada, waxay idiin hayaan adeeg kharash la'aan ah. So [facility] [phone].    ATENCIÓN: Si toby mathew, tiene a gale disposición servicios gratuitos de asistencia lingüística. Llame al [phone].    We comply with applicable federal civil rights laws and [place] laws. We do not discriminate on the basis of race, color, national origin, age, disability, sex, sexual orientation, or gender identity.            Thank you!     Thank you for choosing [facility]  for your care. Our goal is always to provide you with excellent care. Hearing back from our patients is one way we can continue to improve our services. Please take a few minutes to complete the written survey that you may receive in the mail after your visit with us. Thank you!             Your Updated Medication List - Protect others around you: Learn how to safely use, store and throw away your medicines at www.disposemymeds.org.          This list is accurate as of: 10/26/17 11:58 AM.  Always use your most recent med list.                   Brand Name Dispense Instructions for use Diagnosis    furosemide 20 MG tablet    LASIX    15 tablet    Take 1 tablet (20 mg) by mouth 2 times daily    Ankle swelling, unspecified laterality       IBUPROFEN PO      Take 200 mg by mouth every 6 hours as needed for moderate pain        melatonin 1 MG Tabs tablet      Take 1 mg by mouth nightly as needed for sleep    Persistent insomnia       meloxicam 15  MG tablet    MOBIC    30 tablet    Take 1 tablet (15 mg) by mouth daily    Pain in joint, ankle and foot, left       order for DME     1 each    Compression stalkings to wear at night.    Venous (peripheral) insufficiency, Bilateral leg edema       tiZANidine 4 MG tablet    ZANAFLEX    30 tablet    Take 1 tablet (4 mg) by mouth 3 times daily as needed for muscle spasms    Chronic low back pain, unspecified back pain laterality, with sciatica presence unspecified       VITAMIN D3 PO      Take 400 Units by mouth daily

## 2017-10-26 NOTE — NURSING NOTE
"Chief Complaint   Patient presents with     RECHECK     Left foot swelling       Initial /74  Pulse 93  Temp 97.3  F (36.3  C) (Oral)  SpO2 98% Estimated body mass index is 30.54 kg/(m^2) as calculated from the following:    Height as of 3/5/16: 5' 7\" (1.702 m).    Weight as of 6/14/17: 195 lb (88.5 kg).  Medication Reconciliation: complete     Azalea Rojas MA       "

## 2017-10-26 NOTE — PATIENT INSTRUCTIONS
Understanding Chronic Venous Insufficiency  Problems with the veins in the legs may lead to chronic venous insufficiency (CVI). CVI means that there is a long-term problem with the veins not being able to pump blood back to your heart. When this happens, blood stays in the legs and causes swelling and aching.   Two problems that may lead to chronic venous insufficiency are:    Damaged valves. Valves keep blood flowing from the legs through the blood vessels and back to the heart. When the valves are damaged, blood does not flow as well.     Deep vein thrombosis (DVT). Blood clots may form in the deep veins of the legs. This may cause pain, redness, and swelling in the legs. It may also block the flow of blood back to the heart. Seek immediate medical care if you have these symptoms.    A blood clot in the leg can also break off and travel to the lungs. This is called pulmonary embolism (PE). In the lungs, the clot can cut off the flow of blood. This may cause chest pain, trouble breathing, sweating, a fast heartbeat, coughing (may cough up blood), and fainting. It is a medical emergency and may cause death. Call 911 if you have these symptoms.    Healthcare providers call the two conditions, DVT and PE, venous thromboembolism (VTE).  CVI can t be cured, but you can control leg swelling to reduce the likelihood of ulcers (sores).  Recognizing the symptoms  Be aware of the following:    If you stand or sit with your feet down for long periods, your legs may ache or feel heavy.    Swollen ankles are possibly the most common symptom of CVI.    As swelling increases, the skin over your ankles may show red spots or a brownish tinge. The skin may feel leathery or scaly, and may start to itch.    If swelling is not controlled, an ulcer (open wound) may form.  What you can do  Reduce your risk of developing ulcers by doing the following:    Increase blood flow back to your heart by elevating your legs, exercising daily,  and wearing elastic stockings.    Boost blood flow in your legs by losing excess weight.    If you must stand or sit in one place for a period of time, keep your blood moving by wiggling your toes, shifting your body position, and rising up on the balls of your feet.    Date Last Reviewed: 5/1/2016 2000-2017 [employer]. [address] [place] [address]. All rights reserved. This information is not intended as a substitute for professional medical care. Always follow your healthcare professional's instructions.      Radha,    Here are the topics discussed today:    1. We will check your kidney function and electrolytes today to make sure your kidneys are working well.  I will notify you of the results.    2.  We discussed venous insufficiency as a possible cause for your leg swelling.  Please wear compression stalkings at night, please get regular exercise (cardiovascular) for 30mins per day to help with swelling. Please take meloxicam for pain prior to exercising.  I ordered a leg ultrasound to make sure you don't have blood clots.  I placed a referral to vascular surgery for further evaluation and treatment.    [facility]    If you have any questions regarding to your visit please contact your care team:       Team Purple:   Clinic Hours Telephone Number   Dr. Evita Beckwith   7am-7pm  Monday - Thursday   7am-5pm  Fridays  [phone]  (Appointment scheduling available 24/7)    Questions about your Visit?   Team Line:  [phone]   Urgent Care - [place] - 11am-9pm Monday-Friday Saturday-Sunday- 9am-5pm   [place] - 5pm-9pm Monday-Friday Saturday-Sunday- 9am-5pm  [phone] - [place]   [phone] - [place]       What options do I have for visits at the clinic other than the traditional office visit?  To expand how we care for you, many of our providers are utilizing  electronic visits (e-visits) and telephone visits, when medically appropriate, for interactions with their patients rather than a visit in the clinic.   We also offer nurse visits for many medical concerns. Just like any other service, we will bill your insurance company for this type of visit based on time spent on the phone with your provider. Not all insurance companies cover these visits. Please check with your medical insurance if this type of visit is covered. You will be responsible for any charges that are not paid by your insurance.      E-visits via [employer]:  generally incur a $35.00 fee.  Telephone visits:  Time spent on the phone: *charged based on time that is spent on the phone in increments of 10 minutes. Estimated cost:   5-10 mins $30.00   11-20 mins. $59.00   21-30 mins. $85.00     Use [employer] (secure email communication and access to your chart) to send your primary care provider a message or make an appointment. Ask someone on your Team how to sign up for [employer].  For a Price Quote for your services, please call our Consumer Price Line at [phone].  As always, Thank you for trusting us with your health care needs!    Discharge by PANCHO TAVARES

## 2017-10-31 ENCOUNTER — RADIANT APPOINTMENT (OUTPATIENT)
Dept: ULTRASOUND IMAGING | Facility: CLINIC | Age: 47
End: 2017-10-31
Attending: FAMILY MEDICINE
Payer: COMMERCIAL

## 2017-10-31 DIAGNOSIS — R60.0 BILATERAL LEG EDEMA: ICD-10-CM

## 2017-10-31 DIAGNOSIS — I87.2 VENOUS (PERIPHERAL) INSUFFICIENCY: ICD-10-CM

## 2017-10-31 PROCEDURE — 93970 EXTREMITY STUDY: CPT

## 2017-10-31 NOTE — TELEPHONE ENCOUNTER
Made 2nd attempt to reach the patient to get scheduled. Left a message stating this is our last attempt to reach her and to call us to schedule a consult with vascular medicine.

## 2017-11-02 NOTE — TELEPHONE ENCOUNTER
Patient has not responded to 2 calls to schedule referral. Routing to triage nurse to notify referring provider.

## 2017-11-03 ENCOUNTER — OFFICE VISIT (OUTPATIENT)
Dept: VASCULAR SURGERY | Facility: CLINIC | Age: 47
End: 2017-11-03
Payer: COMMERCIAL

## 2017-11-03 DIAGNOSIS — Z53.9 ERRONEOUS ENCOUNTER--DISREGARD: Primary | ICD-10-CM

## 2017-11-03 PROCEDURE — 99203 OFFICE O/P NEW LOW 30 MIN: CPT | Performed by: SURGERY

## 2017-11-03 NOTE — MR AVS SNAPSHOT
After Visit Summary   11/3/2017    Radha Crockett    MRN: [id number]           Patient Information     Date Of Birth          1970        Visit Information        Provider Department      11/3/2017 11:30 AM Victor Manuel Frausto [facility]      Today's Diagnoses     ERRONEOUS ENCOUNTER--DISREGARD    -  1       Follow-ups after your visit        Who to contact     If you have questions or need follow up information about today's clinic visit or your schedule please contact [facility] directly at [phone].  Normal or non-critical lab and imaging results will be communicated to you by [employer]art, letter or phone within 4 business days after the clinic has received the results. If you do not hear from us within 7 days, please contact the clinic through [employer] or phone. If you have a critical or abnormal lab result, we will notify you by phone as soon as possible.  Submit refill requests through [employer] or call your pharmacy and they will forward the refill request to us. Please allow 3 business days for your refill to be completed.          Additional Information About Your Visit        MyChart Information     [employer] gives you secure access to your electronic health record. If you see a primary care provider, you can also send messages to your care team and make appointments. If you have questions, please call your primary care clinic.  If you do not have a primary care provider, please call [phone] and they will assist you.        Care EveryWhere ID     This is your Care EveryWhere ID. This could be used by other organizations to access your [facility] medical records  [id number]         Blood Pressure from Last 3 Encounters:   01/05/18 126/76   10/26/17 132/74   10/13/17 124/64    Weight from Last 3 Encounters:   01/05/18 91.2 kg (201 lb)   06/14/17 88.5 kg (195 lb)   05/25/17 89.8 kg (198 lb)               Today, you had the following     No orders found for display       Primary Care Provider Office Phone # Fax #    Azam Brito [phone] [address] [facility]  [place] [address]        Equal Access to Services     MIOGABRIEL LENNY : Hadii aad ku hadyajairao Soomaali, waaxda luqadaha, qaybta kaalmada adeegyada, waxkendall brunain hayisidron aderomina philip lashamar mccullough. So [facility] [phone].    ATENCIÓN: Si habla español, tiene a gale disposición servicios gratuitos de asistencia lingüística. Llame al [phone].    We comply with applicable federal civil rights laws and [place] laws. We do not discriminate on the basis of race, color, national origin, age, disability, sex, sexual orientation, or gender identity.            Thank you!     Thank you for choosing [facility]  for your care. Our goal is always to provide you with excellent care. Hearing back from our patients is one way we can continue to improve our services. Please take a few minutes to complete the written survey that you may receive in the mail after your visit with us. Thank you!             Your Updated Medication List - Protect others around you: Learn how to safely use, store and throw away your medicines at www.disposemymeds.org.          This list is accurate as of 11/3/17 11:59 PM.  Always use your most recent med list.                   Brand Name Dispense Instructions for use Diagnosis    furosemide 20 MG tablet    LASIX    15 tablet    Take 1 tablet (20 mg) by mouth 2 times daily    Ankle swelling, unspecified laterality       IBUPROFEN PO      Take 200 mg by mouth every 6 hours as needed for moderate pain        melatonin 1 MG Tabs tablet      Take 1 mg by mouth nightly as needed for sleep    Persistent insomnia       meloxicam 15 MG tablet    MOBIC    30 tablet    Take 1 tablet (15 mg) by mouth daily    Pain in joint, ankle and foot, left       order for DME     1 each    Compression stalkings to wear  at night.    Venous (peripheral) insufficiency, Bilateral leg edema       tiZANidine 4 MG tablet    ZANAFLEX    30 tablet    Take 1 tablet (4 mg) by mouth 3 times daily as needed for muscle spasms    Chronic low back pain, unspecified back pain laterality, with sciatica presence unspecified       VITAMIN D3 PO      Take 400 Units by mouth daily

## 2017-11-16 NOTE — PROGRESS NOTES
[facility] Consultation    Swapna Crockett is a 47-year-old female who presents with complaints of bilateral lower extremity pain and swelling.  She describes the pain is making cognizant habitus as well as a cramping and even a burning pain.  Rarely she expresses numbness in her feet.  She's been wearing compression for one year and has not seen significant improvement in the swelling.  She admits that she has gained weight and that this may have contributed to the problem.  She states that she and her  recently moved into a new home and that she spent long hours on her feet cleaning and arranging the home.  Her legs swelled significantly during this time causing a significant increase in her pain.  The pain is worse when she stands for long time and also before her menstrual period.  She does find some relief the discomfort with elevation legs and with exercise or walking.  Excessive fatigue is also a problem for her.    She has no history of deep vein thrombosis or superficial thrombophlebitis.    Her family history significant for varicose veins in her mother.    Past medical history  Medical: Hypertension and a history of hepatitis  Surgical: Rhinoplasty    Medicines: Melatonin and Tizanidin prn    Allergies: Clomid    Social history: She is a nonsmoker and uses alcohol occasionally.  She has not had children.    12 point review of systems was completed and was reviewed.  Is significant for a 10 pound weight gain, fatigue, shortness of breath, dry cough, occasional chest discomfort, back pain, weakness and finger/toenail problems    Physical exam  General: Pleasant female in no acute distress.  She is 5 feet 7 inches tall weighs 200 pounds  HEENT: Normocephalic, atraumatic.  EOMI.  External ears and nose are normal.  Respiratory: Alia Sabino effort  Cardiovascular: Pulses regular  Musculoskeletal: Gait and station are normal.  There is no deformity of the joints of her fingers or toes.  There is no  cyanosis of her nailbeds.  Neurologic: Grossly normal  Extremities: She has 1-2+ edema of her legs and ankles bilaterally.  I appreciate no significant varicose veins.  There are no venous stasis changes.  There are a few scattered telangiectasias.  She has easily palpable dorsalis pedis and posterior tibial pulses bilaterally.    Duplex ultrasound of her lower extremity veins dated 10/31/17 revealed no evidence of deep vein thrombosis in either lower extremity.  There was no assessment for valve incompetence.    Impression  Bilateral lower extremity swelling with pain.  There is no significant evidence of venous disease on physical exam but a competency study to assess her lower extremity deep, superficial and perforating veins is indicated.  She will return in the near future for the study.  I have recommended that she continue to wear 20-30 mmHg compression hose, elevate her legs when possible, avoid excessive salt intake and to continue to exercise.    URIEL Frausto M.D.

## 2017-11-20 ENCOUNTER — APPOINTMENT (OUTPATIENT)
Dept: VASCULAR SURGERY | Facility: CLINIC | Age: 47
End: 2017-11-20
Payer: COMMERCIAL

## 2017-11-20 ENCOUNTER — OFFICE VISIT (OUTPATIENT)
Dept: VASCULAR SURGERY | Facility: CLINIC | Age: 47
End: 2017-11-20
Payer: COMMERCIAL

## 2017-11-20 DIAGNOSIS — Z53.9 ERRONEOUS ENCOUNTER--DISREGARD: Primary | ICD-10-CM

## 2017-11-20 PROCEDURE — 99213 OFFICE O/P EST LOW 20 MIN: CPT | Performed by: SURGERY

## 2017-11-20 PROCEDURE — 93970 EXTREMITY STUDY: CPT | Performed by: SURGERY

## 2017-11-20 NOTE — PROGRESS NOTES
[facility] Office Note    Radha Crockett returns in follow-up of bilateral leg pain and swelling.  She returned today for a duplex ultrasound of her lower extremity veins.    Duplex ultrasound of her right lower extremity reveals no evidence of deep vein thrombosis.  Her common femoral vein is incompetent but remaining deep vein valves are competent.  Her right great saphenous vein is incompetent at the saphenofemoral junction and the proximal calf measures only 2 mm in diameter in the calf.  The right small saphenous vein, anterior accessory saphenous vein are competent.  The vein of Giacomini was not visualized.  In the left lower extremity there was no evidence of deep vein thrombosis or deep vein valve incompetence.  The left great saphenous vein was incompetent in the proximal to mid calf measuring 3.2 mm in diameter maximally.  The left small saphenous vein is competent.  The left anterior accessory saphenous vein is competent.  There are no incompetent perforators appreciated.    Impression  I do not see significant deep or superficial valve incompetence that would explain the swelling and pain of her lower extremities.  The great saphenous vein incompetence is limited and the veins are small and are not contributing to her swelling.  I would not recommend treating the veins as it likely would not change her pain or her swelling.  This was discussed frankly with her.    I would recommend that she continue to wear knee-high gradient compression hose, exercise, attempt to lose weight and avoid added salt.  She will return if she has other concerns or questions.    URIEL Frausto M.D.    Send a copy of this  dicatation to Dr. Den Shen

## 2017-11-20 NOTE — MR AVS SNAPSHOT
After Visit Summary   11/20/2017    Radha Crockett    MRN: [id number]           Patient Information     Date Of Birth          1970        Visit Information        Provider Department      11/20/2017 3:30 PM Victor Manuel Frausto MD [facility]      Today's Diagnoses     ERRONEOUS ENCOUNTER--DISREGARD    -  1       Follow-ups after your visit        Who to contact     If you have questions or need follow up information about today's clinic visit or your schedule please contact [facility] directly at [phone].  Normal or non-critical lab and imaging results will be communicated to you by Simbiosishart, letter or phone within 4 business days after the clinic has received the results. If you do not hear from us within 7 days, please contact the clinic through Simbiosishart or phone. If you have a critical or abnormal lab result, we will notify you by phone as soon as possible.  Submit refill requests through Datam or call your pharmacy and they will forward the refill request to us. Please allow 3 business days for your refill to be completed.          Additional Information About Your Visit        MyChart Information     Datam gives you secure access to your electronic health record. If you see a primary care provider, you can also send messages to your care team and make appointments. If you have questions, please call your primary care clinic.  If you do not have a primary care provider, please call [phone] and they will assist you.        Care EveryWhere ID     This is your Care EveryWhere ID. This could be used by other organizations to access your [facility] medical records  [id number]         Blood Pressure from Last 3 Encounters:   01/05/18 126/76   10/26/17 132/74   10/13/17 124/64    Weight from Last 3 Encounters:   01/05/18 91.2 kg (201 lb)   06/14/17 88.5 kg (195 lb)   05/25/17 89.8 kg (198 lb)               Today, you had the following     No orders found for display       Primary Care Provider Office Phone # Fax #    Azam Brito [phone] [address] [facility]  [place] [address]        Equal Access to Services     MIOGABRIEL LENNY : Hadii aad ku hadyajairao Soomaali, waaxda luqadaha, qaybta kaalmada adeegyada, waxkendall brunain hayisidron aderomina philip lanikhillurdes mccullough. So [facility] [phone].    ATENCIÓN: Si habla español, tiene a gale disposición servicios gratuitos de asistencia lingüística. Llame al [phone].    We comply with applicable federal civil rights laws and [place] laws. We do not discriminate on the basis of race, color, national origin, age, disability, sex, sexual orientation, or gender identity.            Thank you!     Thank you for choosing [facility]  for your care. Our goal is always to provide you with excellent care. Hearing back from our patients is one way we can continue to improve our services. Please take a few minutes to complete the written survey that you may receive in the mail after your visit with us. Thank you!             Your Updated Medication List - Protect others around you: Learn how to safely use, store and throw away your medicines at www.disposemymeds.org.          This list is accurate as of 11/20/17 11:59 PM.  Always use your most recent med list.                   Brand Name Dispense Instructions for use Diagnosis    furosemide 20 MG tablet    LASIX    15 tablet    Take 1 tablet (20 mg) by mouth 2 times daily    Ankle swelling, unspecified laterality       IBUPROFEN PO      Take 200 mg by mouth every 6 hours as needed for moderate pain        melatonin 1 MG Tabs tablet      Take 1 mg by mouth nightly as needed for sleep    Persistent insomnia       meloxicam 15 MG tablet    MOBIC    30 tablet    Take 1 tablet (15 mg) by mouth daily    Pain in joint, ankle and foot, left       order for DME     1 each    Compression stalkings to  wear at night.    Venous (peripheral) insufficiency, Bilateral leg edema       tiZANidine 4 MG tablet    ZANAFLEX    30 tablet    Take 1 tablet (4 mg) by mouth 3 times daily as needed for muscle spasms    Chronic low back pain, unspecified back pain laterality, with sciatica presence unspecified       VITAMIN D3 PO      Take 400 Units by mouth daily

## 2018-01-01 NOTE — MR AVS SNAPSHOT
After Visit Summary   6/14/2017    Radha Crockett    MRN: [id number]           Patient Information     Date Of Birth          1970        Visit Information        Provider Department      6/14/2017 2:00 PM Renzo Garsia MD [facility]        Today's Diagnoses     Cyst of left ovary    -  1    Breast pain        Pelvic pain in female           Follow-ups after your visit        Follow-up notes from your care team     Return if symptoms worsen or fail to improve.      Who to contact     If you have questions or need follow up information about today's clinic visit or your schedule please contact [facility] directly at [phone].  Normal or non-critical lab and imaging results will be communicated to you by MyChart, letter or phone within 4 business days after the clinic has received the results. If you do not hear from us within 7 days, please contact the clinic through Veeco Instrumentshart or phone. If you have a critical or abnormal lab result, we will notify you by phone as soon as possible.  Submit refill requests through [employer] Platform or call your pharmacy and they will forward the refill request to us. Please allow 3 business days for your refill to be completed.          Additional Information About Your Visit        MyChart Information     [employer] Platform gives you secure access to your electronic health record. If you see a primary care provider, you can also send messages to your care team and make appointments. If you have questions, please call your primary care clinic.  If you do not have a primary care provider, please call [phone] and they will assist you.        Care EveryWhere ID     This is your Care EveryWhere ID. This could be used by other organizations to access your [facility] medical records  [id number]        Your Vitals Were     Pulse Last Period Pulse Oximetry BMI (Body Mass Index)          85 05/04/2017 97% 30.54 kg/m2         Blood Pressure from Last 3  [address] [place] [address]  Phone: [phone] Encounters:   06/14/17 136/90   05/25/17 139/88   05/21/16 128/82    Weight from Last 3 Encounters:   06/14/17 195 lb (88.5 kg)   05/25/17 198 lb (89.8 kg)   05/21/16 193 lb (87.5 kg)              Today, you had the following     No orders found for display       Primary Care Provider Office Phone # Fax #    Azam Brito [phone]       [address] [address]        Thank you!     Thank you for choosing [facility]  for your care. Our goal is always to provide you with excellent care. Hearing back from our patients is one way we can continue to improve our services. Please take a few minutes to complete the written survey that you may receive in the mail after your visit with us. Thank you!             Your Updated Medication List - Protect others around you: Learn how to safely use, store and throw away your medicines at www.disposemymeds.org.          This list is accurate as of: 6/14/17 11:59 PM.  Always use your most recent med list.                   Brand Name Dispense Instructions for use    IBUPROFEN PO      Take 200 mg by mouth every 6 hours as needed for moderate pain       melatonin 1 MG Tabs tablet      Take 1 mg by mouth nightly as needed for sleep       tiZANidine 4 MG tablet    ZANAFLEX    30 tablet    Take 1 tablet (4 mg) by mouth 3 times daily as needed for muscle spasms       VITAMIN D3 PO      Take 400 Units by mouth daily

## 2018-01-05 ENCOUNTER — OFFICE VISIT (OUTPATIENT)
Dept: URGENT CARE | Facility: URGENT CARE | Age: 48
End: 2018-01-05
Payer: COMMERCIAL

## 2018-01-05 VITALS
OXYGEN SATURATION: 98 % | HEART RATE: 93 BPM | TEMPERATURE: 97.4 F | RESPIRATION RATE: 16 BRPM | WEIGHT: 201 LBS | DIASTOLIC BLOOD PRESSURE: 76 MMHG | SYSTOLIC BLOOD PRESSURE: 126 MMHG | BODY MASS INDEX: 31.48 KG/M2

## 2018-01-05 DIAGNOSIS — G44.209 ACUTE NON INTRACTABLE TENSION-TYPE HEADACHE: Primary | ICD-10-CM

## 2018-01-05 PROCEDURE — 99213 OFFICE O/P EST LOW 20 MIN: CPT | Performed by: NURSE PRACTITIONER

## 2018-01-05 RX ORDER — TRAMADOL HYDROCHLORIDE 50 MG/1
50 TABLET ORAL EVERY 6 HOURS PRN
Qty: 16 TABLET | Refills: 0 | Status: SHIPPED | OUTPATIENT
Start: 2018-01-05 | End: 2018-01-05

## 2018-01-05 RX ORDER — TRAMADOL HYDROCHLORIDE 50 MG/1
50 TABLET ORAL EVERY 6 HOURS PRN
Qty: 16 TABLET | Refills: 0 | Status: SHIPPED | OUTPATIENT
Start: 2018-01-05 | End: 2019-03-25

## 2018-01-05 NOTE — PROGRESS NOTES
SUBJECTIVE:                                                    Radha Crockett is a 47 year old female who presents to clinic today for the following health issues:      Headaches      Duration: 2 day    Description  Location: left side of face   Character: pressure  Frequency:  All day and going to bed  Duration:  reta    Intensity:  moderate    Accompanying signs and symptoms:    Precipitating or Alleviating factors:  Nausea/vomiting: No  Dizziness: sometimes  Weakness or numbness: sometimes  Visual changes: none  Fever: no   Sinus or URI symptoms no     History  Head trauma: no   Family history of migraines: no   Previous tests for headaches: no   Neurologist evaluations: no   Able to do daily activities when headache present: no   Wake with headaches: no  Daily pain medication use: no  Any changes in: No    Precipitating or Alleviating factors (light/sound/sleep/caffeine): yes    Therapies tried and outcome: Ibuprofen (Advil, Motrin) and Tylenol    Outcome - not effective  Frequent/daily pain medication use: YES      Problem list and histories reviewed & adjusted, as indicated.  Additional history: as documented    Patient Active Problem List   Diagnosis     Recurrent miscarriages     CARDIOVASCULAR SCREENING; LDL GOAL LESS THAN 160     Ovarian cyst     Bilateral lower extremity edema     Bloating     Past Surgical History:   Procedure Laterality Date     NOSE SURGERY  12/2009    cosmetic reasons       Social History   Substance Use Topics     Smoking status: Former Smoker     Packs/day: 0.50     Years: 20.00     Types: Cigarettes     Quit date: 7/12/2010     Smokeless tobacco: Former User     Quit date: 12/3/2010     Alcohol use 0.0 oz/week     0 Standard drinks or equivalent per week      Comment: seldom use     Family History   Problem Relation Age of Onset     Myocardial Infarction Father      at age 63 years      DIABETES Father      GASTROINTESTINAL DISEASE Mother      GI bleeding, source unknown      Vascular Disease Mother      Leukemia Paternal Aunt          Current Outpatient Prescriptions   Medication Sig Dispense Refill     order for DME Compression stalkings to wear at night. 1 each 0     meloxicam (MOBIC) 15 MG tablet Take 1 tablet (15 mg) by mouth daily 30 tablet 1     furosemide (LASIX) 20 MG tablet Take 1 tablet (20 mg) by mouth 2 times daily 15 tablet 1     tiZANidine (ZANAFLEX) 4 MG tablet Take 1 tablet (4 mg) by mouth 3 times daily as needed for muscle spasms 30 tablet 1     IBUPROFEN PO Take 200 mg by mouth every 6 hours as needed for moderate pain       Cholecalciferol (VITAMIN D3 PO) Take 400 Units by mouth daily       melatonin 1 MG TABS Take 1 mg by mouth nightly as needed for sleep       Allergies   Allergen Reactions     Clomid [Clomiphene Citrate] Other (See Comments)     Presumed Ovarian hyperstimulation         ROS:  C: NEGATIVE for fever, chills, change in weight  E/M: NEGATIVE for ear, mouth and throat problems  R: NEGATIVE for significant cough or SOB  CV: NEGATIVE for chest pain, palpitations or peripheral edema  NEURO: POSITIVE for  headaches-musculoskelatal    OBJECTIVE:     /76  Pulse 93  Temp 97.4  F (36.3  C)  Resp 16  Wt 201 lb (91.2 kg)  SpO2 98%  BMI 31.48 kg/m2  Body mass index is 31.48 kg/(m^2).  GENERAL: healthy, alert and no distress  NECK: no adenopathy, no asymmetry, masses, or scars and thyroid normal to palpation  RESP: lungs clear to auscultation - no rales, rhonchi or wheezes  CV: regular rate and rhythm, normal S1 S2, no S3 or S4, no murmur, click or rub, no peripheral edema and peripheral pulses strong  ABDOMEN: soft, nontender, no hepatosplenomegaly, no masses and bowel sounds normal  MS: no gross musculoskeletal defects noted, no edema  NEURO:  equal  strength, neg Romberg, DTR II/IV bilaterally (UE and LE), finger to nose normal, CN intact, ambulates without difficulty.  no focal deficits noted.    Diagnostic Test Results:  none      ASSESSMENT/PLAN:       ICD-10-CM    1. Acute non intractable tension-type headache G44.209 traMADol (ULTRAM) 50 MG tablet     DISCONTINUED: traMADol (ULTRAM) 50 MG tablet     I recommend follow up with PCP in 3 days or sooner if symptoms are getting worse  Side effects of medications discussed  Over the counter medications discussed  All questions are answered and patient is in agreement with treatment plan  Liliane Rivera  FN-BC  Family Nurse Practitoner

## 2018-01-05 NOTE — NURSING NOTE
"Chief Complaint   Patient presents with     Headache     migraine       Initial /76  Pulse 93  Temp 97.4  F (36.3  C)  Resp 16  Wt 201 lb (91.2 kg)  SpO2 98%  BMI 31.48 kg/m2 Estimated body mass index is 31.48 kg/(m^2) as calculated from the following:    Height as of 3/5/16: 5' 7\" (1.702 m).    Weight as of this encounter: 201 lb (91.2 kg).  Medication Reconciliation: complete     Elzbieta Goldstein. MA      "

## 2018-01-05 NOTE — MR AVS SNAPSHOT
After Visit Summary   1/5/2018    Radha Crockett    MRN: [id number]           Patient Information     Date Of Birth          1970        Visit Information        Provider Department      1/5/2018 4:45 PM Liliane Rivera NP [facility] [address]        Today's Diagnoses     Acute non intractable tension-type headache    -  1      Care Instructions       * HEADACHE [unspecified]    The cause of your headache today is not clear, but it does not appear to be the sign of any serious illness.  Under stress, some people tense the muscles of their shoulder, neck and scalp without knowing it. If this condition lasts long enough, a TENSION HEADACHE can occur.  A MIGRAINE HEADACHE is caused by changes in blood flow to the brain. It can be mild or severe. A migraine attack may be triggered by emotional stress, hormone changes during the menstrual cycle, oral contraceptives, alcohol use, certain foods containing tyramine, eye strain, weather changes, missing meals, lack of sleep or oversleeping.  Other causes of headache include a viral illness, sinus, ear or throat infection, dental pain and TMJ (jaw joint) pain.  HOME CARE:    If you were given pain medicine for this headache, do not drive yourself home. Arrange for a ride, instead. When you get home, try to sleep. You should feel much better when you wake up.    If you are having nausea or vomiting, follow a light diet until your headache is relieved.    If you have a migraine type headache, use sunglasses when in the daylight or around bright indoor lighting until symptoms improve. Bright glaring light can worsen this kind of headache.  FOLLOW UP with your doctor if the headache is not better within the next 24 hours. If you have frequent headaches you should discuss a treatment plan with your primary care doctor. By being aware of the earliest signs of headache, and starting treatment right away, you may be able to stop the pain yourself.  GET PROMPT  MEDICAL ATTENTION if any of the following occur:    Worsening of your head pain or no improvement within 24 hours    Repeated vomiting (unable to keep liquids down)    Fever over 101 F (38.3 C)    Stiff neck    Extreme drowsiness, confusion or fainting    Weakness of an arm or leg or one side of the face    Difficulty with speech or vision    7392-0719 [employer]. [address] [place] [address]. All rights reserved. This information is not intended as a substitute for professional medical care. Always follow your healthcare professional's instructions.  This information has been modified by your health care provider with permission from the publisher.            Follow-ups after your visit        Who to contact     If you have questions or need follow up information about today's clinic visit or your schedule please contact [facility] directly at [phone].  Normal or non-critical lab and imaging results will be communicated to you by ieCrowdhart, letter or phone within 4 business days after the clinic has received the results. If you do not hear from us within 7 days, please contact the clinic through Orteqt or phone. If you have a critical or abnormal lab result, we will notify you by phone as soon as possible.  Submit refill requests through Launchups or call your pharmacy and they will forward the refill request to us. Please allow 3 business days for your refill to be completed.          Additional Information About Your Visit        Launchups Information     Launchups gives you secure access to your electronic health record. If you see a primary care provider, you can also send messages to your care team and make appointments. If you have questions, please call your primary care clinic.  If you do not have a primary care provider, please call [phone] and they will assist you.        Care EveryWhere ID     This is your Care EveryWhere ID. This could be used by other  organizations to access your [place] medical records  [id number]        Your Vitals Were     Pulse Temperature Respirations Pulse Oximetry BMI (Body Mass Index)       93 97.4  F (36.3  C) 16 98% 31.48 kg/m2        Blood Pressure from Last 3 Encounters:   01/05/18 126/76   10/26/17 132/74   10/13/17 124/64    Weight from Last 3 Encounters:   01/05/18 201 lb (91.2 kg)   06/14/17 195 lb (88.5 kg)   05/25/17 198 lb (89.8 kg)              Today, you had the following     No orders found for display         Today's Medication Changes          These changes are accurate as of: 1/5/18  6:24 PM.  If you have any questions, ask your nurse or doctor.               Start taking these medicines.        Dose/Directions    traMADol 50 MG tablet   Commonly known as:  ULTRAM   Used for:  Acute non intractable tension-type headache   Started by:  Liliane Rivera NP        Dose:  50 mg   Take 1 tablet (50 mg) by mouth every 6 hours as needed for headaches or pain maximum 4 tablet(s) per day   Quantity:  16 tablet   Refills:  0            Where to get your medicines      Some of these will need a paper prescription and others can be bought over the counter.  Ask your nurse if you have questions.     Bring a paper prescription for each of these medications     traMADol 50 MG tablet                Primary Care Provider Office Phone # Fax #    Azam Brito [phone]       [address] [facility] [address]        Equal Access to Services     [facility] : Hadlanden toth Soeliazar, waaxda luqadaha, qaybta kaalmada adeegyada, waxkendall idiin hayaan adeeg kharash la'aan . So [facility] [phone].    ATENCIÓN: Si habla español, tiene a gale disposición servicios gratuitos de asistencia lingüística. Llame al [phone].    We comply with applicable federal civil rights laws and [place] laws. We do not discriminate on the basis of race, color, national origin, age, disability, sex, sexual orientation, or  gender identity.            Thank you!     Thank you for choosing [facility]  for your care. Our goal is always to provide you with excellent care. Hearing back from our patients is one way we can continue to improve our services. Please take a few minutes to complete the written survey that you may receive in the mail after your visit with us. Thank you!             Your Updated Medication List - Protect others around you: Learn how to safely use, store and throw away your medicines at www.disposemymeds.org.          This list is accurate as of: 1/5/18  6:24 PM.  Always use your most recent med list.                   Brand Name Dispense Instructions for use Diagnosis    furosemide 20 MG tablet    LASIX    15 tablet    Take 1 tablet (20 mg) by mouth 2 times daily    Ankle swelling, unspecified laterality       IBUPROFEN PO      Take 200 mg by mouth every 6 hours as needed for moderate pain        melatonin 1 MG Tabs tablet      Take 1 mg by mouth nightly as needed for sleep    Persistent insomnia       meloxicam 15 MG tablet    MOBIC    30 tablet    Take 1 tablet (15 mg) by mouth daily    Pain in joint, ankle and foot, left       order for DME     1 each    Compression stalkings to wear at night.    Venous (peripheral) insufficiency, Bilateral leg edema       tiZANidine 4 MG tablet    ZANAFLEX    30 tablet    Take 1 tablet (4 mg) by mouth 3 times daily as needed for muscle spasms    Chronic low back pain, unspecified back pain laterality, with sciatica presence unspecified       traMADol 50 MG tablet    ULTRAM    16 tablet    Take 1 tablet (50 mg) by mouth every 6 hours as needed for headaches or pain maximum 4 tablet(s) per day    Acute non intractable tension-type headache       VITAMIN D3 PO      Take 400 Units by mouth daily

## 2018-01-06 NOTE — PATIENT INSTRUCTIONS
* HEADACHE [unspecified]    The cause of your headache today is not clear, but it does not appear to be the sign of any serious illness.  Under stress, some people tense the muscles of their shoulder, neck and scalp without knowing it. If this condition lasts long enough, a TENSION HEADACHE can occur.  A MIGRAINE HEADACHE is caused by changes in blood flow to the brain. It can be mild or severe. A migraine attack may be triggered by emotional stress, hormone changes during the menstrual cycle, oral contraceptives, alcohol use, certain foods containing tyramine, eye strain, weather changes, missing meals, lack of sleep or oversleeping.  Other causes of headache include a viral illness, sinus, ear or throat infection, dental pain and TMJ (jaw joint) pain.  HOME CARE:    If you were given pain medicine for this headache, do not drive yourself home. Arrange for a ride, instead. When you get home, try to sleep. You should feel much better when you wake up.    If you are having nausea or vomiting, follow a light diet until your headache is relieved.    If you have a migraine type headache, use sunglasses when in the daylight or around bright indoor lighting until symptoms improve. Bright glaring light can worsen this kind of headache.  FOLLOW UP with your doctor if the headache is not better within the next 24 hours. If you have frequent headaches you should discuss a treatment plan with your primary care doctor. By being aware of the earliest signs of headache, and starting treatment right away, you may be able to stop the pain yourself.  GET PROMPT MEDICAL ATTENTION if any of the following occur:    Worsening of your head pain or no improvement within 24 hours    Repeated vomiting (unable to keep liquids down)    Fever over 101 F (38.3 C)    Stiff neck    Extreme drowsiness, confusion or fainting    Weakness of an arm or leg or one side of the face    Difficulty with speech or vision    9844-6732 [facility]  [employer]. [address] [place] [address]. All rights reserved. This information is not intended as a substitute for professional medical care. Always follow your healthcare professional's instructions.  This information has been modified by your health care provider with permission from the publisher.

## 2018-01-12 ENCOUNTER — NURSE TRIAGE (OUTPATIENT)
Dept: NURSING | Facility: CLINIC | Age: 48
End: 2018-01-12

## 2018-07-16 ENCOUNTER — OFFICE VISIT (OUTPATIENT)
Dept: URGENT CARE | Facility: URGENT CARE | Age: 48
End: 2018-07-16
Payer: COMMERCIAL

## 2018-07-16 VITALS
DIASTOLIC BLOOD PRESSURE: 85 MMHG | HEART RATE: 84 BPM | OXYGEN SATURATION: 98 % | WEIGHT: 207 LBS | BODY MASS INDEX: 32.42 KG/M2 | SYSTOLIC BLOOD PRESSURE: 144 MMHG | TEMPERATURE: 98.2 F

## 2018-07-16 DIAGNOSIS — L50.9 URTICARIA: Primary | ICD-10-CM

## 2018-07-16 PROCEDURE — 99213 OFFICE O/P EST LOW 20 MIN: CPT | Performed by: NURSE PRACTITIONER

## 2018-07-16 RX ORDER — PREDNISONE 20 MG/1
40 TABLET ORAL DAILY
Qty: 10 TABLET | Refills: 0 | Status: SHIPPED | OUTPATIENT
Start: 2018-07-16 | End: 2018-07-21

## 2018-07-16 RX ORDER — HYDROXYZINE HYDROCHLORIDE 25 MG/1
25-50 TABLET, FILM COATED ORAL EVERY 6 HOURS PRN
Qty: 60 TABLET | Refills: 1 | Status: SHIPPED | OUTPATIENT
Start: 2018-07-16 | End: 2019-03-25

## 2018-07-16 RX ORDER — TRIAMCINOLONE ACETONIDE 1 MG/G
CREAM TOPICAL
Qty: 80 G | Refills: 0 | Status: SHIPPED | OUTPATIENT
Start: 2018-07-16 | End: 2019-03-25

## 2018-07-16 NOTE — MR AVS SNAPSHOT
After Visit Summary   7/16/2018    Radha Crockett    MRN: [id number]           Patient Information     Date Of Birth          1970        Visit Information        Provider Department      7/16/2018 5:45 PM [facility] URGENT CARE [facility]        Today's Diagnoses     Urticaria    -  1      Care Instructions    Kenalog cream up to three times daily.  Atarax 1-2 tabs up to every 6 hours for itching.  Prednisone steroid 2 tabs once daily for five days.   Hives (Adult)  Hives are pink or red bumps on the skin. These bumps are also known as wheals. The bumps can itch, burn, or sting. Hives can occur anywhere on the body. They vary in size and shape and can form in clusters. Individual hives can appear and go away quickly. New hives may develop as old ones fade. Hives are common and usually harmless. Occasionally hives are a sign of a serious allergy.  Hives are often caused by an allergic reaction. It may be an allergic reaction to foods such as fruit, shellfish, chocolate, nuts, or tomatoes. It may be a reaction to pollens, animal fur, or mold spores. Medicines, chemicals, and insect bites can also cause hives. And hives can be caused by hot sun or cold air. The cause of hives can be difficult to find.  You may be given medicines to relieve swelling and itching. Follow all instructions when using these medicines. The hives will usually fade in a few days, but can last up to 2 weeks.  Home care  Follow these tips:    Try to find the cause of the hives and eliminate it. Discuss possible causes with your healthcare provider. Future reactions to the same allergen may be worse.    Don t scratch the hives. Scratching will delay healing. To reduce itching, apply cool, wet compresses to the skin.    Dress in soft, loose cotton clothing.    Don t bathe in hot water. This can make the itching worse.    Apply an ice pack or cool pack wrapped in a thin towel to your skin. This will help  reduce redness and itching. But if your hives were caused by exposure to cold, then do not apply more cold to them.    You may use over-the counter antihistamines to reduce itching. Some older antihistamines, such as diphenhydramine and chlorpheniramine, are inexpensive. But they need to be taken often and may make you sleepy. They are best used at bedtime. Don t use diphenhydramine if you have glaucoma or have trouble urinating because of an enlarged prostate. Newer antihistamines, such as loratadine, cetirizine, and fexofenadine, are generally more expensive. But they tend to have fewer side effects, such as drowsiness. They can be taken less often.    Another type of antihistamine is used to treat heartburn. This type includes ranitidine, nizatidine, famotidine, and cimetidine. These are sometimes used along with the above antihistamines if a single medicine is not working.  Follow-up care  Follow up with your healthcare provider if your symptoms don't get better in 2 days. Ask your provider about allergy testing if you have had a severe reaction, or have had several episodes of hives. He or she can use the allergy testing to find out what you are allergic to.  When to seek medical advice  Call your healthcare provider right away if any of these occur:    Fever of 100.4 F (38.0 C) or higher, or as directed by your healthcare provider    Redness, swelling, or pain    Foul-smelling fluid coming from the rash  Call 911  Call 911 if any of the following occur:    Swelling of the face, throat, or tongue    Trouble breathing or swallowing    Dizziness, weakness, or fainting  Date Last Reviewed: 9/1/2016 2000-2017 [employer]. [address] [place] [address], [place] [address]. All rights reserved. This information is not intended as a substitute for professional medical care. Always follow your healthcare professional's instructions.                Follow-ups after your visit        Who to contact     If you  have questions or need follow up information about today's clinic visit or your schedule please contact [facility] directly at [phone].  Normal or non-critical lab and imaging results will be communicated to you by MyChart, letter or phone within 4 business days after the clinic has received the results. If you do not hear from us within 7 days, please contact the clinic through [email or web address] or phone. If you have a critical or abnormal lab result, we will notify you by phone as soon as possible.  Submit refill requests through BootstrapLabs or call your pharmacy and they will forward the refill request to us. Please allow 3 business days for your refill to be completed.          Additional Information About Your Visit        [email or web address].[email or web address] Information     [employer] gives you secure access to your electronic health record. If you see a primary care provider, you can also send messages to your care team and make appointments. If you have questions, please call your primary care clinic.  If you do not have a primary care provider, please call [phone] and they will assist you.        Care EveryWhere ID     This is your Care EveryWhere ID. This could be used by other organizations to access your [place] medical records  [id number]        Your Vitals Were     Pulse Temperature Pulse Oximetry BMI (Body Mass Index)          84 98.2  F (36.8  C) (Oral) 98% 32.42 kg/m2         Blood Pressure from Last 3 Encounters:   07/16/18 144/85   01/05/18 126/76   10/26/17 132/74    Weight from Last 3 Encounters:   07/16/18 207 lb (93.9 kg)   01/05/18 201 lb (91.2 kg)   06/14/17 195 lb (88.5 kg)              Today, you had the following     No orders found for display         Today's Medication Changes          These changes are accurate as of 7/16/18  6:14 PM.  If you have any questions, ask your nurse or doctor.               Start taking these medicines.        Dose/Directions    hydrOXYzine 25 MG tablet   Commonly known  as:  ATARAX   Used for:  Urticaria        Dose:  25-50 mg   Take 1-2 tablets (25-50 mg) by mouth every 6 hours as needed for itching   Quantity:  60 tablet   Refills:  1       predniSONE 20 MG tablet   Commonly known as:  DELTASONE   Used for:  Urticaria        Dose:  40 mg   Take 2 tablets (40 mg) by mouth daily for 5 days   Quantity:  10 tablet   Refills:  0       triamcinolone 0.1 % cream   Commonly known as:  KENALOG   Used for:  Urticaria        Apply sparingly to affected area three times daily as needed   Quantity:  80 g   Refills:  0            Where to get your medicines      These medications were sent to [id number]  [place], [place] - [address] AT [facility] [address]  [address] [facility], [place] [address]     Phone:  [phone]     hydrOXYzine 25 MG tablet    predniSONE 20 MG tablet    triamcinolone 0.1 % cream                Primary Care Provider Office Phone # Fax #    Azam Brito [phone] [address] [facility] [address]        Equal Access to Services     [facility]: Hadii luisa ku hadasho Solenali, waaxda luqadaha, qaybta kaalmada adejordan, devora velazquez . So [facility] [phone].    ATENCIÓN: Si habla español, tiene a gale disposición servicios gratuitos de asistencia lingüística. [facility] [phone].    We comply with applicable federal civil rights laws and [place] laws. We do not discriminate on the basis of race, color, national origin, age, disability, sex, sexual orientation, or gender identity.            Thank you!     Thank you for choosing [facility]  for your care. Our goal is always to provide you with excellent care. Hearing back from our patients is one way we can continue to improve our services. Please take a few minutes to complete the written survey that you may receive in the mail after your visit with us. Thank you!             Your Updated  Medication List - Protect others around you: Learn how to safely use, store and throw away your medicines at www.disposemymeds.org.          This list is accurate as of 7/16/18  6:14 PM.  Always use your most recent med list.                   Brand Name Dispense Instructions for use Diagnosis    furosemide 20 MG tablet    LASIX    15 tablet    Take 1 tablet (20 mg) by mouth 2 times daily    Ankle swelling, unspecified laterality       hydrOXYzine 25 MG tablet    ATARAX    60 tablet    Take 1-2 tablets (25-50 mg) by mouth every 6 hours as needed for itching    Urticaria       IBUPROFEN PO      Take 200 mg by mouth every 6 hours as needed for moderate pain        melatonin 1 MG Tabs tablet      Take 1 mg by mouth nightly as needed for sleep    Persistent insomnia       meloxicam 15 MG tablet    MOBIC    30 tablet    Take 1 tablet (15 mg) by mouth daily    Pain in joint, ankle and foot, left       order for DME     1 each    Compression stalkings to wear at night.    Venous (peripheral) insufficiency, Bilateral leg edema       predniSONE 20 MG tablet    DELTASONE    10 tablet    Take 2 tablets (40 mg) by mouth daily for 5 days    Urticaria       tiZANidine 4 MG tablet    ZANAFLEX    30 tablet    Take 1 tablet (4 mg) by mouth 3 times daily as needed for muscle spasms    Chronic low back pain, unspecified back pain laterality, with sciatica presence unspecified       traMADol 50 MG tablet    ULTRAM    16 tablet    Take 1 tablet (50 mg) by mouth every 6 hours as needed for headaches or pain maximum 4 tablet(s) per day    Acute non intractable tension-type headache       triamcinolone 0.1 % cream    KENALOG    80 g    Apply sparingly to affected area three times daily as needed    Urticaria       VITAMIN D3 PO      Take 400 Units by mouth daily

## 2018-07-16 NOTE — PATIENT INSTRUCTIONS
Kenalog cream up to three times daily.  Atarax 1-2 tabs up to every 6 hours for itching.  Prednisone steroid 2 tabs once daily for five days.   Hives (Adult)  Hives are pink or red bumps on the skin. These bumps are also known as wheals. The bumps can itch, burn, or sting. Hives can occur anywhere on the body. They vary in size and shape and can form in clusters. Individual hives can appear and go away quickly. New hives may develop as old ones fade. Hives are common and usually harmless. Occasionally hives are a sign of a serious allergy.  Hives are often caused by an allergic reaction. It may be an allergic reaction to foods such as fruit, shellfish, chocolate, nuts, or tomatoes. It may be a reaction to pollens, animal fur, or mold spores. Medicines, chemicals, and insect bites can also cause hives. And hives can be caused by hot sun or cold air. The cause of hives can be difficult to find.  You may be given medicines to relieve swelling and itching. Follow all instructions when using these medicines. The hives will usually fade in a few days, but can last up to 2 weeks.  Home care  Follow these tips:    Try to find the cause of the hives and eliminate it. Discuss possible causes with your healthcare provider. Future reactions to the same allergen may be worse.    Don t scratch the hives. Scratching will delay healing. To reduce itching, apply cool, wet compresses to the skin.    Dress in soft, loose cotton clothing.    Don t bathe in hot water. This can make the itching worse.    Apply an ice pack or cool pack wrapped in a thin towel to your skin. This will help reduce redness and itching. But if your hives were caused by exposure to cold, then do not apply more cold to them.    You may use over-the counter antihistamines to reduce itching. Some older antihistamines, such as diphenhydramine and chlorpheniramine, are inexpensive. But they need to be taken often and may make you sleepy. They are best used at  bedtime. Don t use diphenhydramine if you have glaucoma or have trouble urinating because of an enlarged prostate. Newer antihistamines, such as loratadine, cetirizine, and fexofenadine, are generally more expensive. But they tend to have fewer side effects, such as drowsiness. They can be taken less often.    Another type of antihistamine is used to treat heartburn. This type includes ranitidine, nizatidine, famotidine, and cimetidine. These are sometimes used along with the above antihistamines if a single medicine is not working.  Follow-up care  Follow up with your healthcare provider if your symptoms don't get better in 2 days. Ask your provider about allergy testing if you have had a severe reaction, or have had several episodes of hives. He or she can use the allergy testing to find out what you are allergic to.  When to seek medical advice  Call your healthcare provider right away if any of these occur:    Fever of 100.4 F (38.0 C) or higher, or as directed by your healthcare provider    Redness, swelling, or pain    Foul-smelling fluid coming from the rash  Call 911  Call 911 if any of the following occur:    Swelling of the face, throat, or tongue    Trouble breathing or swallowing    Dizziness, weakness, or fainting  Date Last Reviewed: 9/1/2016 2000-2017 [employer] [address] [place] [address]. All rights reserved. This information is not intended as a substitute for professional medical care. Always follow your healthcare professional's instructions.

## 2018-07-16 NOTE — PROGRESS NOTES
SUBJECTIVE:  Radha Crockett is a 47 year old female who presents to the clinic today for a rash.  Onset of rash was 2 month(s) ago.   Rash is still present.  Location of the rash: generalized.  Quality/symptoms of rash: itching and red   Symptoms are moderate and rash seems to be not changing over the course of time.  Previous history of a similar rash? No  Recent exposure history: working outside in the garden, exposed to many different plants. Also reports multiple mosquito bites, does not like to wear sunscreen.  -has tried nothing for relief.    Associated symptoms include: nothing.    Past Medical History:   Diagnosis Date     Miscarriage     2 in past     Current Outpatient Prescriptions   Medication Sig Dispense Refill     Cholecalciferol (VITAMIN D3 PO) Take 400 Units by mouth daily       furosemide (LASIX) 20 MG tablet Take 1 tablet (20 mg) by mouth 2 times daily 15 tablet 1     IBUPROFEN PO Take 200 mg by mouth every 6 hours as needed for moderate pain       melatonin 1 MG TABS Take 1 mg by mouth nightly as needed for sleep       meloxicam (MOBIC) 15 MG tablet Take 1 tablet (15 mg) by mouth daily 30 tablet 1     order for DME Compression stalkings to wear at night. 1 each 0     tiZANidine (ZANAFLEX) 4 MG tablet Take 1 tablet (4 mg) by mouth 3 times daily as needed for muscle spasms 30 tablet 1     traMADol (ULTRAM) 50 MG tablet Take 1 tablet (50 mg) by mouth every 6 hours as needed for headaches or pain maximum 4 tablet(s) per day 16 tablet 0     Social History   Substance Use Topics     Smoking status: Former Smoker     Packs/day: 0.50     Years: 20.00     Types: Cigarettes     Quit date: 7/12/2010     Smokeless tobacco: Former User     Quit date: 12/3/2010     Alcohol use 0.0 oz/week     0 Standard drinks or equivalent per week      Comment: seldom use       ROS:  Review of systems negative except as stated above.    EXAM:   /85 (BP Location: Left arm, Patient Position: Chair, Cuff Size: Adult  Regular)  Pulse 84  Temp 98.2  F (36.8  C) (Oral)  Wt 207 lb (93.9 kg)  SpO2 98%  BMI 32.42 kg/m2  GENERAL: alert, no acute distress.  SKIN: Rash description:    Distribution: generalized  Location: generalized    Color: red,  Lesion type: papular, wheals, round, scattered discrete lesions with warmth  GENERAL APPEARANCE: healthy, alert and no distress    ASSESSMENT:  Insect bites  Urticaria    (L50.9) Urticaria  (primary encounter diagnosis)  Comment: start steroid burst prednisone 20 mg daily x 5 days  -Atarax 25-50 mg q6h as needed  -Kenalog cream to area  -Discussed insect repellant, avoiding/limit exposures.  Plan: predniSONE (DELTASONE) 20 MG tablet,         hydrOXYzine (ATARAX) 25 MG tablet,         triamcinolone (KENALOG) 0.1 % cream               PLAN:  1) See today's orders.  2) Follow-up with primary clinic if not improving    Patient Instructions   Kenalog cream up to three times daily.  Atarax 1-2 tabs up to every 6 hours for itching.  Prednisone steroid 2 tabs once daily for five days.   Hives (Adult)  Hives are pink or red bumps on the skin. These bumps are also known as wheals. The bumps can itch, burn, or sting. Hives can occur anywhere on the body. They vary in size and shape and can form in clusters. Individual hives can appear and go away quickly. New hives may develop as old ones fade. Hives are common and usually harmless. Occasionally hives are a sign of a serious allergy.  Hives are often caused by an allergic reaction. It may be an allergic reaction to foods such as fruit, shellfish, chocolate, nuts, or tomatoes. It may be a reaction to pollens, animal fur, or mold spores. Medicines, chemicals, and insect bites can also cause hives. And hives can be caused by hot sun or cold air. The cause of hives can be difficult to find.  You may be given medicines to relieve swelling and itching. Follow all instructions when using these medicines. The hives will usually fade in a few days, but can  last up to 2 weeks.  Home care  Follow these tips:    Try to find the cause of the hives and eliminate it. Discuss possible causes with your healthcare provider. Future reactions to the same allergen may be worse.    Don t scratch the hives. Scratching will delay healing. To reduce itching, apply cool, wet compresses to the skin.    Dress in soft, loose cotton clothing.    Don t bathe in hot water. This can make the itching worse.    Apply an ice pack or cool pack wrapped in a thin towel to your skin. This will help reduce redness and itching. But if your hives were caused by exposure to cold, then do not apply more cold to them.    You may use over-the counter antihistamines to reduce itching. Some older antihistamines, such as diphenhydramine and chlorpheniramine, are inexpensive. But they need to be taken often and may make you sleepy. They are best used at bedtime. Don t use diphenhydramine if you have glaucoma or have trouble urinating because of an enlarged prostate. Newer antihistamines, such as loratadine, cetirizine, and fexofenadine, are generally more expensive. But they tend to have fewer side effects, such as drowsiness. They can be taken less often.    Another type of antihistamine is used to treat heartburn. This type includes ranitidine, nizatidine, famotidine, and cimetidine. These are sometimes used along with the above antihistamines if a single medicine is not working.  Follow-up care  Follow up with your healthcare provider if your symptoms don't get better in 2 days. Ask your provider about allergy testing if you have had a severe reaction, or have had several episodes of hives. He or she can use the allergy testing to find out what you are allergic to.  When to seek medical advice  Call your healthcare provider right away if any of these occur:    Fever of 100.4 F (38.0 C) or higher, or as directed by your healthcare provider    Redness, swelling, or pain    Foul-smelling fluid coming from the  rash  Call 911  Call 911 if any of the following occur:    Swelling of the face, throat, or tongue    Trouble breathing or swallowing    Dizziness, weakness, or fainting  Date Last Reviewed: 9/1/2016 2000-2017 [employer]. [address] [place] [address]. All rights reserved. This information is not intended as a substitute for professional medical care. Always follow your healthcare professional's instructions.

## 2018-08-06 ENCOUNTER — HEALTH MAINTENANCE LETTER (OUTPATIENT)
Age: 48
End: 2018-08-06

## 2018-08-29 ENCOUNTER — RADIANT APPOINTMENT (OUTPATIENT)
Dept: MAMMOGRAPHY | Facility: CLINIC | Age: 48
End: 2018-08-29
Attending: OBSTETRICS & GYNECOLOGY
Payer: COMMERCIAL

## 2018-08-29 DIAGNOSIS — Z12.31 VISIT FOR SCREENING MAMMOGRAM: ICD-10-CM

## 2018-08-29 PROCEDURE — 77067 SCR MAMMO BI INCL CAD: CPT | Performed by: RADIOLOGY

## 2019-01-18 ENCOUNTER — OFFICE VISIT (OUTPATIENT)
Dept: FAMILY MEDICINE | Facility: CLINIC | Age: 49
End: 2019-01-18
Payer: COMMERCIAL

## 2019-01-18 VITALS
DIASTOLIC BLOOD PRESSURE: 86 MMHG | HEART RATE: 90 BPM | OXYGEN SATURATION: 99 % | HEIGHT: 65 IN | RESPIRATION RATE: 13 BRPM | WEIGHT: 205 LBS | BODY MASS INDEX: 34.16 KG/M2 | TEMPERATURE: 96.7 F | SYSTOLIC BLOOD PRESSURE: 144 MMHG

## 2019-01-18 DIAGNOSIS — G89.29 CHRONIC LOW BACK PAIN, UNSPECIFIED BACK PAIN LATERALITY, WITH SCIATICA PRESENCE UNSPECIFIED: ICD-10-CM

## 2019-01-18 DIAGNOSIS — M54.5 CHRONIC LOW BACK PAIN, UNSPECIFIED BACK PAIN LATERALITY, WITH SCIATICA PRESENCE UNSPECIFIED: ICD-10-CM

## 2019-01-18 DIAGNOSIS — M54.50 ACUTE RIGHT-SIDED LOW BACK PAIN WITHOUT SCIATICA: Primary | ICD-10-CM

## 2019-01-18 PROCEDURE — 99213 OFFICE O/P EST LOW 20 MIN: CPT | Performed by: FAMILY MEDICINE

## 2019-01-18 ASSESSMENT — MIFFLIN-ST. JEOR: SCORE: 1564.71

## 2019-01-18 NOTE — PROGRESS NOTES
SUBJECTIVE:   Radha Crockett is a 48 year old female who presents to clinic today for the following health issues:    Back Pain     Duration: x 18-19 days        Specific cause: none    Description:   Location of pain: low back right  Character of pain: sharp, stabbing and constant  Pain radiation:radiates into the right buttocks  New numbness or weakness in legs, not attributed to pain:  no     Intensity: Currently 10/10, severe    History:   Pain interferes with job: YES  History of back problems: no prior back problems  Any previous MRI or X-rays: None  Sees a specialist for back pain:  No  Therapies tried without relief: acetaminophen (Tylenol), ibuprofen, cold, heat, massage, rest, sitting, standing, CBD, icy hot, and stretch    Alleviating factors:   Improved by: none      Precipitating factors:  Worsened by: Lifting, Bending, Standing, Sitting, Lying Flat and Walking      Accompanying Signs & Symptoms:  Risk of Fracture:  None  Risk of Cauda Equina:  None  Risk of Infection:  None  Risk of Cancer:  None  Risk of Ankylosing Spondylitis:  Onset at age <35, male, AND morning back stiffness. no      Pain is in the Rt upper gluteal area and radiates down the butt.  Pain is consistent:  worst  Sometimes hard to walk.      Problem list and histories reviewed & adjusted, as indicated.  Additional history: as documented    Patient Active Problem List   Diagnosis     Recurrent miscarriages     CARDIOVASCULAR SCREENING; LDL GOAL LESS THAN 160     Ovarian cyst     Bilateral lower extremity edema     Bloating     Past Surgical History:   Procedure Laterality Date     NOSE SURGERY  2009    cosmetic reasons       Social History     Tobacco Use     Smoking status: Former Smoker     Packs/day: 0.50     Years: 20.00     Pack years: 10.00     Types: Cigarettes     Last attempt to quit: 2010     Years since quittin.5     Smokeless tobacco: Former User     Quit date: 12/3/2010   Substance Use Topics     Alcohol use:  "Yes     Alcohol/week: 0.0 oz     Comment: seldom use     Family History   Problem Relation Age of Onset     Myocardial Infarction Father         at age 63 years      Diabetes Father      Gastrointestinal Disease Mother         GI bleeding, source unknown     Vascular Disease Mother      Leukemia Paternal Aunt          Tobacco  Allergies  Meds  Med Hx  Surg Hx  Fam Hx  Soc Hx      Reviewed and updated as needed this visit by Provider    ROS:  HEENT, cardiovascular, pulmonary, gi and gu systems are negative, except as otherwise noted.    OBJECTIVE:     /86 (BP Location: Left arm, Patient Position: Chair, Cuff Size: Adult Regular)   Pulse 90   Temp 96.7  F (35.9  C) (Oral)   Resp 13   Ht 1.657 m (5' 5.25\")   Wt 93 kg (205 lb)   LMP  (LMP Unknown)   SpO2 99%   Breastfeeding? No   BMI 33.85 kg/m     Body mass index is 33.85 kg/m .  GENERAL: healthy, alert and moderate distress  RESP: lungs clear to auscultation - no rales, rhonchi or wheezes  CV: regular rate and rhythm, no murmur, click or rub, no peripheral edema   Comprehensive back pain exam:  Tenderness of Rt upper gluteal, Pain limits the following motions: flexion, Lower extremity strength functional and equal on both sides, Lower extremity reflexes within normal limits bilaterally, Lower extremity sensation normal and equal on both sides and Straight leg raise negative bilaterally    ASSESSMENT/PLAN:     (M54.5) Acute right-sided low back pain without sciatica  (primary encounter diagnosis)  Comment: Has on going pain and and hurt again about 3 weeks ago    (M54.5,  G89.29) Chronic low back pain, unspecified back pain laterality, with sciatica presence unspecified  Comment: Back stretches, muscle relaxer, heat  Plan: tiZANidine (ZANAFLEX) 4 MG tablet    Follow up in 2 weeks or sooner with concerns    Azam Brito MD  [facility]  "

## 2019-01-18 NOTE — PATIENT INSTRUCTIONS
[facility]    If you have any questions regarding to your visit please contact your care team:       Team Purple:   Clinic Hours Telephone Number   Dr. Evita Beckwith   7am-7pm  Monday - Thursday   7am-5pm  Fridays  [phone]  (Appointment scheduling available 24/7)   Urgent Care - [place] and [facility] - 11am-9pm Monday-Friday Saturday-Sunday- 9am-5pm   [place] - 5pm-9pm Monday-Friday Saturday-Sunday- 9am-5pm  [phone] - [place]  [phone] - [place]       What options do I have for a visit other than an office visit? We offer electronic visits (e-visits) and telephone visits, when medically appropriate.  Please check with your medical insurance to see if these types of visits are covered, as you will be responsible for any charges that are not paid by your insurance.      You can use [employer] (secure electronic communication) to access to your chart, send your primary care provider a message, or make an appointment. Ask a team member how to get started.     For a price quote for your services, please call our Consumer Price Line at [phone] or our Imaging Cost estimation line at [phone] (for imaging tests).

## 2019-01-18 NOTE — NURSING NOTE
"Chief Complaint   Patient presents with     Back Pain     Initial /86 (BP Location: Left arm, Patient Position: Chair, Cuff Size: Adult Regular)   Pulse 90   Temp 96.7  F (35.9  C) (Oral)   Resp 13   Ht 1.657 m (5' 5.25\")   Wt 93 kg (205 lb)   LMP  (LMP Unknown)   SpO2 99%   Breastfeeding? No   BMI 33.85 kg/m   Estimated body mass index is 33.85 kg/m  as calculated from the following:    Height as of this encounter: 1.657 m (5' 5.25\").    Weight as of this encounter: 93 kg (205 lb).  BP completed using cuff size: regular    Darren Rasmussen  "

## 2019-02-15 ENCOUNTER — HEALTH MAINTENANCE LETTER (OUTPATIENT)
Age: 49
End: 2019-02-15

## 2019-02-25 ENCOUNTER — TELEPHONE (OUTPATIENT)
Dept: FAMILY MEDICINE | Facility: CLINIC | Age: 49
End: 2019-02-25

## 2019-02-25 ENCOUNTER — MYC MEDICAL ADVICE (OUTPATIENT)
Dept: INTERNAL MEDICINE | Facility: CLINIC | Age: 49
End: 2019-02-25

## 2019-02-25 NOTE — TELEPHONE ENCOUNTER
Panel Management Review      Patient has the following on her problem list: None      Composite cancer screening  Chart review shows that this patient is due/due soon for the following Pap Smear  Summary:    Patient is due/failing the following:   PAP    Action needed:   Patient needs office visit for physical and pap.    Type of outreach:    Sent [employer] message.    Questions for provider review:    None                                                                                                                                    Lianne Stephens MA       Chart routed to none .

## 2019-03-21 NOTE — PROGRESS NOTES
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     Pain worse   Started new year  Tizanidine does not help anymore.  When start walking pain is better.  Worse when waking up in the morning: stiff  Pain lower back bilaterally  No radiation.    Problem list and histories reviewed & adjusted, as indicated.  Additional history: as documented    Patient Active Problem List   Diagnosis     Recurrent miscarriages     CARDIOVASCULAR SCREENING; LDL GOAL LESS THAN 160     Ovarian cyst     Bilateral lower extremity edema     Bloating     Past Surgical History:   Procedure Laterality Date     NOSE SURGERY  2009    cosmetic reasons       Social History     Tobacco Use     Smoking status: Former Smoker     Packs/day: 0.50     Years: 20.00     Pack years: 10.00     Types: Cigarettes     Last attempt to quit: 2010     Years since quittin.7     Smokeless tobacco: Former User     Quit date: 12/3/2010   Substance Use Topics     Alcohol use: Yes     Alcohol/week: 0.0 oz     Comment: seldom use     Family History   Problem Relation Age of Onset     Myocardial Infarction Father         at age 63 years      Diabetes Father      Gastrointestinal Disease Mother         GI bleeding, source unknown     Vascular Disease Mother      Leukemia Paternal Aunt          Reviewed and updated as needed this visit by Provider    ROS:  Constitutional, HEENT, cardiovascular, pulmonary, gi and gu systems are negative, except as otherwise noted.    OBJECTIVE:     /86   Pulse 83   Temp 97.5  F (36.4  C) (Oral)   Resp 16   Ht 1.657 m (5' 5.25\")   Wt 94.8 kg (209 lb)   SpO2 99%   BMI 34.51 kg/m    Body mass index is 34.51 kg/m .  GENERAL: healthy, alert and no distress  NECK: no " adenopathy and thyroid normal to palpation  RESP: lungs clear to auscultation - no rales, rhonchi or wheezes  CV: regular rate and rhythm, no murmur, click or rub, no peripheral edema  ABDOMEN: soft, nontender, no masses and bowel sounds normal  MS: lower para lumbar tenderness. FROM    Diagnostic Test Results:  none     ASSESSMENT/PLAN:     (M54.5) Acute bilateral low back pain without sciatica  (primary encounter diagnosis)  Comment: 1) Ice to back 2-3 times per day  2) Lifting mechanics discussed  3) Analgesics such as Tylenol and/or ibuprofen.  4) Aerobic exercise program, this was strongly encouraged as one of the best ways to manage chronic back pain  5) PT if not improving    Plan: XR Lumbar Spine 2/3 Views, meloxicam (MOBIC) 15        MG tablet    Follow up in 1 month or sooner with concerns    Azam Brito MD  [facility]

## 2019-03-25 ENCOUNTER — ANCILLARY PROCEDURE (OUTPATIENT)
Dept: GENERAL RADIOLOGY | Facility: CLINIC | Age: 49
End: 2019-03-25
Attending: FAMILY MEDICINE
Payer: COMMERCIAL

## 2019-03-25 ENCOUNTER — OFFICE VISIT (OUTPATIENT)
Dept: FAMILY MEDICINE | Facility: CLINIC | Age: 49
End: 2019-03-25
Payer: COMMERCIAL

## 2019-03-25 VITALS
DIASTOLIC BLOOD PRESSURE: 86 MMHG | WEIGHT: 209 LBS | OXYGEN SATURATION: 99 % | BODY MASS INDEX: 34.82 KG/M2 | HEIGHT: 65 IN | HEART RATE: 83 BPM | SYSTOLIC BLOOD PRESSURE: 138 MMHG | RESPIRATION RATE: 16 BRPM | TEMPERATURE: 97.5 F

## 2019-03-25 DIAGNOSIS — M54.50 ACUTE BILATERAL LOW BACK PAIN WITHOUT SCIATICA: Primary | ICD-10-CM

## 2019-03-25 DIAGNOSIS — M54.50 ACUTE BILATERAL LOW BACK PAIN WITHOUT SCIATICA: ICD-10-CM

## 2019-03-25 PROCEDURE — 72100 X-RAY EXAM L-S SPINE 2/3 VWS: CPT

## 2019-03-25 PROCEDURE — 99213 OFFICE O/P EST LOW 20 MIN: CPT | Performed by: FAMILY MEDICINE

## 2019-03-25 RX ORDER — MELOXICAM 15 MG/1
15 TABLET ORAL DAILY
Qty: 30 TABLET | Refills: 0 | Status: SHIPPED | OUTPATIENT
Start: 2019-03-25 | End: 2020-01-17

## 2019-03-25 ASSESSMENT — MIFFLIN-ST. JEOR: SCORE: 1582.86

## 2019-03-25 NOTE — PATIENT INSTRUCTIONS
[facility]    If you have any questions regarding to your visit please contact your care team:       Team Purple:   Clinic Hours Telephone Number   Dr. Evita Beckwith   7am-7pm  Monday - Thursday   7am-5pm  Fridays  [phone]  (Appointment scheduling available 24/7)   Urgent Care - [place] and [facility] - 11am-9pm Monday-Friday Saturday-Sunday- 9am-5pm   [place] - 5pm-9pm Monday-Friday Saturday-Sunday- 9am-5pm  [phone] - [place]  [phone] - [place]       What options do I have for a visit other than an office visit? We offer electronic visits (e-visits) and telephone visits, when medically appropriate.  Please check with your medical insurance to see if these types of visits are covered, as you will be responsible for any charges that are not paid by your insurance.      You can use [employer] (secure electronic communication) to access to your chart, send your primary care provider a message, or make an appointment. Ask a team member how to get started.     For a price quote for your services, please call our Consumer Price Line at [phone] or our Imaging Cost estimation line at [phone] (for imaging tests).

## 2019-09-20 ENCOUNTER — TELEPHONE (OUTPATIENT)
Dept: FAMILY MEDICINE | Facility: CLINIC | Age: 49
End: 2019-09-20

## 2019-09-20 NOTE — TELEPHONE ENCOUNTER
Panel Management Review      Patient has the following on her problem list: None      Composite cancer screening  Chart review shows that this patient is due/due soon for the following Pap Smear and Mammogram  Summary:    Patient is due/failing the following:   MAMMOGRAM, PAP and PHYSICAL    Action needed:   Patient needs office visit for CPE.    Type of outreach:    Sent [employer] message.    Questions for provider review:    None                                                                                                                                    Moon Galloway [facility]         Chart routed to  .

## 2020-01-16 ENCOUNTER — OFFICE VISIT (OUTPATIENT)
Dept: OBGYN | Facility: CLINIC | Age: 50
End: 2020-01-16
Payer: COMMERCIAL

## 2020-01-16 ENCOUNTER — ANCILLARY PROCEDURE (OUTPATIENT)
Dept: MAMMOGRAPHY | Facility: CLINIC | Age: 50
End: 2020-01-16
Attending: OBSTETRICS & GYNECOLOGY
Payer: COMMERCIAL

## 2020-01-16 VITALS
HEART RATE: 86 BPM | WEIGHT: 215 LBS | DIASTOLIC BLOOD PRESSURE: 93 MMHG | BODY MASS INDEX: 35.5 KG/M2 | SYSTOLIC BLOOD PRESSURE: 155 MMHG | OXYGEN SATURATION: 98 %

## 2020-01-16 DIAGNOSIS — N93.9 ABNORMAL UTERINE BLEEDING: Primary | ICD-10-CM

## 2020-01-16 DIAGNOSIS — Z12.31 VISIT FOR SCREENING MAMMOGRAM: ICD-10-CM

## 2020-01-16 PROCEDURE — 99214 OFFICE O/P EST MOD 30 MIN: CPT | Mod: 25 | Performed by: OBSTETRICS & GYNECOLOGY

## 2020-01-16 PROCEDURE — 77063 BREAST TOMOSYNTHESIS BI: CPT

## 2020-01-16 PROCEDURE — 58100 BIOPSY OF UTERUS LINING: CPT | Performed by: OBSTETRICS & GYNECOLOGY

## 2020-01-16 PROCEDURE — 77067 SCR MAMMO BI INCL CAD: CPT

## 2020-01-16 PROCEDURE — 88305 TISSUE EXAM BY PATHOLOGIST: CPT | Performed by: OBSTETRICS & GYNECOLOGY

## 2020-01-16 NOTE — PROGRESS NOTES
"Radha is a 49 year old  here with complaint of abnormal uterine bleeding.    She has been having irregular bleeding.  On , she had her menses.  10 days later she had \"mucousy\"spotting only when she wiped with urination.  That lasted a week.  She then had her menses in January, on the .  This was one week late for her.  Her menses used to be 27 days, and then the menses changed about a year ago to 25 days.  This was regularly 25 days, until this month.    She has not had any clots with the menses.  She will change it every 2 to 3 hours, whether needing to change or not.    She has not noted any aggravating or alleviating factors.    She has not had dizziness, shortness of breath, she has had some fatigue, no pelvic pain.    She has had persistent and intermittent non focal breast pain.  She has had this for a long time, and it is not acute.  We reviewed the non focal breast pain, intermittent and seems to be cyclical with menses.    She has had the 3D mammogram last year.  We reviewed the films.  She does not have a mammogram yet this year.  With the heterogeneously dense tissue, she should have yearly mammograms.       Past Medical History:   Diagnosis Date     Miscarriage     2 in past       Past Surgical History:   Procedure Laterality Date     NOSE SURGERY  2009    cosmetic reasons       OB History    Para Term  AB Living   4 0 0 0 4 0   SAB TAB Ectopic Multiple Live Births   3 1 0 0 0      # Outcome Date GA Lbr Ezequiel/2nd Weight Sex Delivery Anes PTL Lv   4 SAB            3 TAB            2 SAB            1 SAB                Gynecological History         Patient's last menstrual period was 2020 (exact date).     No STD/no PID/no IUD      see above         Allergies   Allergen Reactions     Clomid [Clomiphene Citrate] Other (See Comments)     Presumed Ovarian hyperstimulation       Current Outpatient Medications   Medication Sig Dispense Refill     IBUPROFEN PO Take 200 mg " by mouth every 6 hours as needed for moderate pain       melatonin 1 MG TABS Take 1 mg by mouth nightly as needed for sleep       meloxicam (MOBIC) 15 MG tablet Take 1 tablet (15 mg) by mouth daily 30 tablet 0     tiZANidine (ZANAFLEX) 4 MG tablet Take 1 tablet (4 mg) by mouth 3 times daily as needed for muscle spasms 30 tablet 1       Social History     Socioeconomic History     Marital status:      Spouse name: Not on file     Number of children: Not on file     Years of education: Not on file     Highest education level: Not on file   Occupational History     Not on file   Social Needs     Financial resource strain: Not on file     Food insecurity:     Worry: Not on file     Inability: Not on file     Transportation needs:     Medical: Not on file     Non-medical: Not on file   Tobacco Use     Smoking status: Former Smoker     Packs/day: 0.50     Years: 20.00     Pack years: 10.00     Types: Cigarettes     Last attempt to quit: 2010     Years since quittin.5     Smokeless tobacco: Former User     Quit date: 12/3/2010   Substance and Sexual Activity     Alcohol use: Yes     Alcohol/week: 0.0 standard drinks     Comment: seldom use     Drug use: No     Sexual activity: Yes     Partners: Male   Lifestyle     Physical activity:     Days per week: Not on file     Minutes per session: Not on file     Stress: Not on file   Relationships     Social connections:     Talks on phone: Not on file     Gets together: Not on file     Attends Sikh service: Not on file     Active member of club or organization: Not on file     Attends meetings of clubs or organizations: Not on file     Relationship status: Not on file     Intimate partner violence:     Fear of current or ex partner: Not on file     Emotionally abused: Not on file     Physically abused: Not on file     Forced sexual activity: Not on file   Other Topics Concern     Parent/sibling w/ CABG, MI or angioplasty before 65F 55M? Not Asked   Social  History Narrative    Lives at home with her .         Family History   Problem Relation Age of Onset     Myocardial Infarction Father         at age 63 years      Diabetes Father      Gastrointestinal Disease Mother         GI bleeding, source unknown     Vascular Disease Mother      Leukemia Paternal Aunt          Review of Systems:  10 point ROS of systems including Constitutional, Eyes, Respiratory, Cardiovascular, Gastroenterology, Genitourinary, Integumentary, Muscularskeletal, Psychiatric were all negative except for pertinent positives noted in my HPI and in the PMH.          EXAM:  BP (!) 155/93 (BP Location: Right arm, Cuff Size: Adult Large)   Pulse 86   Wt 97.5 kg (215 lb)   LMP 01/03/2020 (Exact Date)   SpO2 98%   BMI 35.50 kg/m    Body mass index is 35.5 kg/m .  General Appearance:  healthy, alert, active, no distress  Skin:  Normal skin turgor  Neuro:  Alert, cranial nerves grossly intact  HEENT: NCAT  Neck:  No masses or lesions carotids are +2/4. No bruits heard  Lungs:  Good respiratory effort   Abdomen: Soft, nontender.  Normal bowel sounds.  No masses  Vulva: No external lesions, normal hair distribution, no adenopathy  BUS:  Normal, no masses noted  Urethral meatus:  No masses noted  Urethra:  No hypermobility  Vagina: Moist, pink, no abnormal discharge, well rugated, no lesions  Cervix: Smooth, pink, no visible lesions  Uterus: Normal size, anteverted, non-tender, mobile  Ovaries: No mass, non-tender, mobile  Extremities:  No clubbing, cyanosis or edema.        ASSESSMENT:  Abnormal uterine bleeding   Breast pain      PLAN:  We discussed the bleeding profiles as people age and approach menopause.  Even though menstrual changes and irregularities are common and expected, they may also indicate or precipitate endometrial abnormalities.   The patient and I discussed the options for evaluation.  The EMB, SIS and the D&C were reviewed with her.  The EMB will not remove a polyp, but will  give a tissue sample.  The SIS will further evaluate the lining, but no tissue sample, and should she have a suspected polyp, it would not be removed.  The D&C is more expensive but is currently the gold standard.    She desires to proceed with the EMB.  She does not desire to return another day for the biopsy, but she hasn't taken Ibuprofen prior to this visit.  She would like to take Ibuprofen and have the EMB today.  She will get her mammogram and then return for the biopsy in about 1.5 hours (I have a surgery and I will return after it is completed).     Renzo Garsia MD        PROCEDURE NOTE:  The procedure was reviewed with patient.  After consenting to the procedure she was placed into the dorsal lithotomy position.  The examination was performed.  The speculum was placed into the vagina.  The cervix was prepped with Betadine.  The anterior lip of the cervix was grasped with the Allis tenaculum.  The uterus was sounded to 10 cm.  The Pipelle was used to sample the endometrium and 2 passes were made.    Instruments were removed and the specimen was sent to pathology.  Results to the patient in 1-2 weeks when they are returned.

## 2020-01-17 ENCOUNTER — OFFICE VISIT (OUTPATIENT)
Dept: FAMILY MEDICINE | Facility: CLINIC | Age: 50
End: 2020-01-17
Payer: COMMERCIAL

## 2020-01-17 ENCOUNTER — TELEPHONE (OUTPATIENT)
Dept: NURSING | Facility: CLINIC | Age: 50
End: 2020-01-17

## 2020-01-17 VITALS
BODY MASS INDEX: 34.68 KG/M2 | HEART RATE: 87 BPM | WEIGHT: 210 LBS | DIASTOLIC BLOOD PRESSURE: 78 MMHG | OXYGEN SATURATION: 100 % | SYSTOLIC BLOOD PRESSURE: 130 MMHG | TEMPERATURE: 96.9 F

## 2020-01-17 DIAGNOSIS — R07.89 OTHER CHEST PAIN: Primary | ICD-10-CM

## 2020-01-17 DIAGNOSIS — R06.02 SOB (SHORTNESS OF BREATH) ON EXERTION: ICD-10-CM

## 2020-01-17 PROCEDURE — 93000 ELECTROCARDIOGRAM COMPLETE: CPT | Performed by: FAMILY MEDICINE

## 2020-01-17 PROCEDURE — 99214 OFFICE O/P EST MOD 30 MIN: CPT | Performed by: FAMILY MEDICINE

## 2020-01-17 NOTE — PROGRESS NOTES
"Subjective     Radha Crockett is a 49 year old female who presents to clinic today for the following health issues:    HPI   Chief Complaint   Patient presents with     Chest Pain     x 3 weeks-see nurse triage note from today.     Radha Crockett is a 49 year old female complaining of intermittent chest pressure and burning x 3 weeks. Sensation Increases after she eats. Admits to laying down after eating and continues to eat late at night. She also reports that the chest pain gets worse with exertion sometimes and going upstairs.  Denies any continuous or intermittent pain, tightness, pressure, or discomfort accompanied by SOB, dizziness or weakness, cool moist skin; N/V; pain in neck, shoulders, jaw, back or arms; blue or grey face, lips, earlobes or fingernails.    Patient has gained over 100 lbs over the past 5 years. Slight discomfort at this time. Rated \"discomfort at 3/10.    Wt Readings from Last 4 Encounters:   20 95.3 kg (210 lb)   20 97.5 kg (215 lb)   19 94.8 kg (209 lb)   19 93 kg (205 lb)     Based on last cholesterol  The 10-year ASCVD risk score (Onarga ABRAHAN Jr., et al., 2013) is: 1.2%    Values used to calculate the score:      Age: 49 years      Sex: Female      Is Non- : No      Diabetic: No      Tobacco smoker: No      Systolic Blood Pressure: 130 mmHg      Is BP treated: No      HDL Cholesterol: 54 mg/dL      Total Cholesterol: 200 mg/dL      Patient Active Problem List   Diagnosis     Recurrent miscarriages     CARDIOVASCULAR SCREENING; LDL GOAL LESS THAN 160     Ovarian cyst     Bilateral lower extremity edema     Bloating     Past Surgical History:   Procedure Laterality Date     NOSE SURGERY  2009    cosmetic reasons       Social History     Tobacco Use     Smoking status: Former Smoker     Packs/day: 0.50     Years: 20.00     Pack years: 10.00     Types: Cigarettes     Last attempt to quit: 2010     Years since quittin.5     Smokeless " "tobacco: Former User     Quit date: 12/3/2010   Substance Use Topics     Alcohol use: Yes     Alcohol/week: 0.0 standard drinks     Comment: seldom use     Family History   Problem Relation Age of Onset     Myocardial Infarction Father         at age 63 years      Diabetes Father      Gastrointestinal Disease Mother         GI bleeding, source unknown     Vascular Disease Mother      Leukemia Paternal Aunt          Review of Systems   HEENT, pulmonary, gi and gu systems are negative, except as otherwise noted.      Objective    /78   Pulse 87   Temp 96.9  F (36.1  C) (Oral)   Wt 95.3 kg (210 lb)   LMP 01/03/2020 (Exact Date)   SpO2 100%   BMI 34.68 kg/m    Body mass index is 34.68 kg/m .  Physical Exam   GENERAL: healthy, alert and no distress  HENT: ear canals and TM's normal, nose and mouth without ulcers or lesions  NECK: no adenopathy and thyroid normal to palpation  RESP: lungs clear to auscultation - no rales, rhonchi or wheezes  CV: regular rate and rhythm, no murmur, click or rub, no peripheral edema   MS: no gross musculoskeletal defects noted, no edema    Diagnostic Test Results:  Labs reviewed in Epic        Assessment & Plan     Radha was seen today for chest pain.    Diagnoses and all orders for this visit:    Other chest pain    Differentials: CAD, chest wall pain, anxiety. EKG normal and unchanged from previous. Given SOB with exertion will order echo.  -     EKG 12-lead complete w/read - Clinics  -     Echo Stress Test with Definity; Future    SOB (shortness of breath) on exertion     Could be weight related. Weight loss.  -     Echo Stress Test with Definity; Future    Estimated body mass index is 34.68 kg/m  as calculated from the following:    Height as of 3/25/19: 1.657 m (5' 5.25\").    Weight as of this encounter: 95.3 kg (210 lb).   Weight management plan: Discussed healthy diet and exercise guidelines    No follow-ups on file.    Azam Brito MD  [facility] " [facility]    Based on last cholesterol  The 10-year ASCVD risk score (Be GAUTHIER Jr., et al., 2013) is: 1.2%    Values used to calculate the score:      Age: 49 years      Sex: Female      Is Non- : No      Diabetic: No      Tobacco smoker: No      Systolic Blood Pressure: 130 mmHg      Is BP treated: No      HDL Cholesterol: 54 mg/dL      Total Cholesterol: 200 mg/dL

## 2020-01-17 NOTE — PATIENT INSTRUCTIONS
Patient Education     Noncardiac Chest Pain    Based on your visit today, the healthcare provider doesn t know what is causing your chest pain. In most cases, people who come to the emergency department with chest pain don t have a problem with their heart. Instead, the pain is caused by other conditions. It's important for the healthcare team to be sure you are not having a life threatening cause for chest pain such as a heart attack, blood clot in the lungs, collapsed lung, ruptured esophagus, or tearing of the aorta. Once these major causes have been ruled out, you may have further evaluation for non-heart causes of chest pain. These may be problems with the lungs, muscles, bones, digestive tract, nerves, or mental health.  Lung problems    Inflammation around the lungs (pleurisy)    Collapsed lung (pneumothorax)    Fluid around the lungs (pleural effusion)    Lung cancer (a rare cause of chest pain)  Muscle or bone problems    Inflamed cartilage between the ribs (costochondritis)    Fibromyalgia    Rheumatoid arthritis    Chest wall strain  Digestive system problems    Reflux    Stomach ulcer    Spasms of the esophagus    Gall stones    Gallbladder inflammation  Mental health conditions    Panic or anxiety attacks    Emotional distress  Your condition doesn t seem serious and your pain doesn t appear to be coming from your heart. But sometimes the signs of a serious problem take more time to appear. Watch for the warning signs listed below.  Home care  Follow these guidelines when caring for yourself at home:    Rest today and avoid strenuous activity.    Take any prescribed medicine as directed.  Follow-up care  Follow up with your healthcare provider, or as advised, if you don t start to feel better within 24 hours.  When to seek medical advice  Call your healthcare provider right away if any of these occur:    A change in the type of pain. Call if it feels different, becomes more serious, lasts longer, or  begins to spread into your shoulder, arm, neck, jaw, or back.    Shortness of breath    You feel more pain when you breathe    Cough with dark-colored mucus or blood    Weakness, dizziness, or fainting    Fever of 100.4 F (38 C) or higher, or as directed by your healthcare provider    Swelling, pain, or redness in one leg  Date Last Reviewed: 12/1/2016 2000-2019 [employer]. [address] [place] [address], [place] [id number]. All rights reserved. This information is not intended as a substitute for professional medical care. Always follow your healthcare professional's instructions.

## 2020-01-20 LAB — COPATH REPORT: NORMAL

## 2020-01-27 ENCOUNTER — MYC MEDICAL ADVICE (OUTPATIENT)
Dept: OBGYN | Facility: CLINIC | Age: 50
End: 2020-01-27

## 2020-01-27 DIAGNOSIS — N64.4 BREAST PAIN, LEFT: Primary | ICD-10-CM

## 2020-01-28 NOTE — TELEPHONE ENCOUNTER
I called patient.   She still is concerned about the left breast pain and wondering about ultrasound and MRI.    I suggest to eliminate the caffeine and to use an NSAID OTC as needed.   I will order the left breast ultrasound.    I don't feel that referral to general surgery or back to FP will be of benefit at this time.    She voices understanding.   Renzo Garsia MD

## 2020-02-04 ENCOUNTER — ANCILLARY PROCEDURE (OUTPATIENT)
Dept: MAMMOGRAPHY | Facility: CLINIC | Age: 50
End: 2020-02-04
Attending: OBSTETRICS & GYNECOLOGY
Payer: COMMERCIAL

## 2020-02-04 ENCOUNTER — ANCILLARY PROCEDURE (OUTPATIENT)
Dept: ULTRASOUND IMAGING | Facility: CLINIC | Age: 50
End: 2020-02-04
Attending: OBSTETRICS & GYNECOLOGY
Payer: COMMERCIAL

## 2020-02-04 DIAGNOSIS — N64.4 BREAST PAIN, LEFT: ICD-10-CM

## 2020-02-04 PROCEDURE — 76642 ULTRASOUND BREAST LIMITED: CPT | Mod: LT | Performed by: RADIOLOGY

## 2020-02-04 PROCEDURE — 77065 DX MAMMO INCL CAD UNI: CPT | Performed by: RADIOLOGY

## 2020-02-04 PROCEDURE — G0279 TOMOSYNTHESIS, MAMMO: HCPCS | Performed by: RADIOLOGY

## 2020-02-11 ENCOUNTER — ANCILLARY PROCEDURE (OUTPATIENT)
Dept: CARDIOLOGY | Facility: CLINIC | Age: 50
End: 2020-02-11
Attending: FAMILY MEDICINE
Payer: COMMERCIAL

## 2020-02-11 DIAGNOSIS — R07.89 OTHER CHEST PAIN: ICD-10-CM

## 2020-02-11 DIAGNOSIS — R06.02 SOB (SHORTNESS OF BREATH) ON EXERTION: ICD-10-CM

## 2020-02-11 PROCEDURE — 93350 STRESS TTE ONLY: CPT | Mod: 26 | Performed by: INTERNAL MEDICINE

## 2020-02-11 PROCEDURE — 93325 DOPPLER ECHO COLOR FLOW MAPG: CPT | Mod: 26 | Performed by: INTERNAL MEDICINE

## 2020-02-11 PROCEDURE — 93321 DOPPLER ECHO F-UP/LMTD STD: CPT | Mod: TC | Performed by: INTERNAL MEDICINE

## 2020-02-11 PROCEDURE — 93018 CV STRESS TEST I&R ONLY: CPT | Performed by: INTERNAL MEDICINE

## 2020-02-11 PROCEDURE — 93325 DOPPLER ECHO COLOR FLOW MAPG: CPT | Mod: TC | Performed by: INTERNAL MEDICINE

## 2020-02-11 PROCEDURE — 93017 CV STRESS TEST TRACING ONLY: CPT | Performed by: INTERNAL MEDICINE

## 2020-02-11 PROCEDURE — 93016 CV STRESS TEST SUPVJ ONLY: CPT | Performed by: INTERNAL MEDICINE

## 2020-02-11 PROCEDURE — 93350 STRESS TTE ONLY: CPT | Mod: TC | Performed by: INTERNAL MEDICINE

## 2020-02-11 PROCEDURE — 93321 DOPPLER ECHO F-UP/LMTD STD: CPT | Mod: 26 | Performed by: INTERNAL MEDICINE

## 2020-03-01 ENCOUNTER — HEALTH MAINTENANCE LETTER (OUTPATIENT)
Age: 50
End: 2020-03-01

## 2020-08-26 ENCOUNTER — TRANSFERRED RECORDS (OUTPATIENT)
Dept: HEALTH INFORMATION MANAGEMENT | Facility: CLINIC | Age: 50
End: 2020-08-26

## 2020-09-14 ENCOUNTER — OFFICE VISIT (OUTPATIENT)
Dept: FAMILY MEDICINE | Facility: CLINIC | Age: 50
End: 2020-09-14
Payer: COMMERCIAL

## 2020-09-14 VITALS
TEMPERATURE: 98.6 F | WEIGHT: 223 LBS | RESPIRATION RATE: 16 BRPM | OXYGEN SATURATION: 98 % | HEIGHT: 66 IN | HEART RATE: 94 BPM | SYSTOLIC BLOOD PRESSURE: 132 MMHG | DIASTOLIC BLOOD PRESSURE: 86 MMHG | BODY MASS INDEX: 35.84 KG/M2

## 2020-09-14 DIAGNOSIS — Z09 HOSPITAL DISCHARGE FOLLOW-UP: ICD-10-CM

## 2020-09-14 DIAGNOSIS — R42 VERTIGO: Primary | ICD-10-CM

## 2020-09-14 PROCEDURE — 99215 OFFICE O/P EST HI 40 MIN: CPT | Performed by: FAMILY MEDICINE

## 2020-09-14 RX ORDER — ONDANSETRON 4 MG/1
4 TABLET, FILM COATED ORAL
COMMUNITY
Start: 2020-08-30 | End: 2021-10-12

## 2020-09-14 RX ORDER — CLONAZEPAM 0.5 MG/1
.5-1 TABLET ORAL 2 TIMES DAILY PRN
Qty: 15 TABLET | Refills: 0 | Status: SHIPPED | OUTPATIENT
Start: 2020-09-14 | End: 2020-12-28

## 2020-09-14 RX ORDER — MECLIZINE HYDROCHLORIDE 25 MG/1
25-50 TABLET ORAL 3 TIMES DAILY PRN
Qty: 90 TABLET | Refills: 1 | Status: SHIPPED | OUTPATIENT
Start: 2020-09-14 | End: 2020-12-28

## 2020-09-14 ASSESSMENT — MIFFLIN-ST. JEOR: SCORE: 1648.27

## 2020-09-14 NOTE — PATIENT INSTRUCTIONS
Please call [facility] imaging at: [phone]  Order info:  MRI brain w/o contrast - Open MRI  Please set up an appointment with neurology and physical therapy as well.  Take meclizine as needed for vertigo symptoms 25mg during the day and 50mg at bedtime.  You can try taking klonopin as well 1-2 tablets every 12 hours. Let me know if you have questions.    Den Beckwith MD

## 2020-09-14 NOTE — PROGRESS NOTES
Subjective     Radha Crockett is a 50 year old female who presents to clinic today for the following health issues:    HPI     Hospital Follow-up Visit:    Hospital/Nursing Home/IP Rehab Facility: [facility]  Date of Admission: 8/26/2020  Date of Discharge: 8/30/2020  Reason(s) for Admission: Vertigo      Was your hospitalization related to COVID-19? No   Problems taking medications regularly:  None  Medication changes since discharge: Was given meclizine, zofran and prednisone ; finished prednisone and meclizine  Problems adhering to non-medication therapy:  None    Summary of hospitalization:  [facility] discharge summary reviewed  Diagnostic Tests/Treatments reviewed.  Follow up needed: specialist  Other Healthcare Providers Involved in Patient s Care:         None  Update since discharge: improved. Post Discharge Medication Reconciliation: discharge medications reconciled and changed, per note/orders.  Plan of care communicated with patient             Radha presents for hospital follow-up visit.    Per chart review, discharge diagnoses include:  Vertigo 2/2 possible vestibular neuronitis  Medication changes at discharge include:  Meclizine, zofran, prednisone  Status since discharge:  Improved, however symptoms remain to some extent    HOSPITALIST DISCHARGE SUMMARY   [facility]     Admission Date: 8/26/2020  Discharge Date: 8/30/2020    Discharge Plan: Radha Crockett was discharged to home.    Principal Diagnosis     Vertigo secondary to possible vestibular neuronitis    Hospital Problem List   Principal Problem:  Vertigo    ADDITIONAL COMMENTS REGARDING DIAGNOSIS SPECIFICITY  Additional Diagnosis Information         BMI>30, which is consistent with OBESITY. This is clinically significant due to increased nursing cares, use of resources and specialty equipment.       Hospital Course     Radha Crockett is a 49 y.o. female with no significant past medical history admitted with vertigo that began that morning  of admission. She has spinning even with her eyes closed as well as significant nausea/vomiting. No prior episodes. No fever/chills, chest pain, dyspnea, abdominal pain, bowel/bladder issues, or other acute complaints. After admission patient had a CT angiogram and also CT of the head which were negative. Patient could not complete MRI. Patient was started on prednisone with mild improvement in the symptoms. Patient was seen by PT who recommended outpatient vestibular therapy. Patient was discharged home with walker and advised to continue meclizine and prednisone as needed.      Recommendations for Outpatient Provider   PCP: Jabari Nicole MD     Recommendations for outpatient provider   Specific recommendations to be addressed at the follow up visit:    vertigo from vestibular neuritis .         Discharge Medications       Your Home Medicines     START taking these medicines   Instructions   meclizine 25 mg tablet  For diagnoses: Vertigo  Commonly known as: ANTIVERT  Take 1 tablet by mouth 3 times daily if needed for Vertigo.    ondansetron 4 mg tablet  For diagnoses: Vertigo  Commonly known as: ZOFRAN  Take 1 tablet by mouth every 8 hours if needed for Nausea/Vomiting.    predniSONE 10 mg tablet  For diagnoses: Vertigo  Commonly known as: DELTASONE  Take 3 tabs daily with food for 3 days, then 2 tablets daily for 3 days, and then 1 tablet daily for 3 days.    Walker  For diagnoses: Vertigo  Walker with front wheels for home use for 99 days.      CONTINUE taking these medicines   Instructions   ibuprofen 200 mg tablet  Commonly known as: ADVIL; MOTRIN  Take 400 mg by mouth 4 times daily if needed.    melatonin 1 mg tablet  Take 1 mg by mouth at bedtime.         Woke up in the morning one day and everything was spinning  Fell on the floor and started vomiting  Episode lasted for 1 week  Hospital stay for 5 days  Has yet to try taking meclizine, unknown if effective        Review of Systems   Constitutional,  "HEENT, cardiovascular, pulmonary, gi and gu systems are negative, except as otherwise noted.      Objective    /86   Pulse 94   Temp 98.6  F (37  C) (Oral)   Resp 16   Ht 1.676 m (5' 6\")   Wt 101.2 kg (223 lb)   SpO2 98%   BMI 35.99 kg/m    Body mass index is 35.99 kg/m .  Physical Exam   GENERAL: healthy, alert and no distress  EYES: Eyes grossly normal to inspection, PERRL and conjunctivae and sclerae normal  HENT: ear canals and TM's normal, nose and mouth without ulcers or lesions  NECK: no adenopathy, no asymmetry, masses, or scars and thyroid normal to palpation  RESP: lungs clear to auscultation - no rales, rhonchi or wheezes  CV: regular rate and rhythm, normal S1 S2, no S3 or S4, no murmur, click or rub, no peripheral edema and peripheral pulses strong  ABDOMEN: soft, nontender, no hepatosplenomegaly, no masses and bowel sounds normal  MS: no gross musculoskeletal defects noted, no edema  SKIN: no suspicious lesions or rashes  NEURO: Normal strength and tone, mentation intact and speech normal  PSYCH: mentation appears normal, affect normal/bright            Assessment & Plan       ICD-10-CM    1. Vertigo  R42 NEUROLOGY ADULT REFERRAL     [facility] REFERR[facility]     MR Brain w/o Contrast     meclizine (ANTIVERT) 25 MG tablet     clonazePAM (KLONOPIN) 0.5 MG tablet   2. Hospital discharge follow-up  Z09             Patient Instructions   [facility]: [phone]  Order info:  MRI brain w/o contrast - Open MRI  Please set up an appointment with neurology and physical therapy as well.  Take meclizine as needed for vertigo symptoms 25mg during the day and 50mg at bedtime.  You can try taking klonopin as well 1-2 tablets every 12 hours. Let me know if you have questions.    Den Beckwith MD     A total of 50 minutes spent face-to-face with greater than 50% of the time spent in counseling and coordinating cares of the issues above   Return in about 2 weeks " (around 9/28/2020) for With Specialist.    Den Beckwith MD  [facility]

## 2020-09-15 ENCOUNTER — TELEPHONE (OUTPATIENT)
Dept: FAMILY MEDICINE | Facility: CLINIC | Age: 50
End: 2020-09-15

## 2020-09-15 NOTE — TELEPHONE ENCOUNTER
Reason for call:  Other   Patient called regarding (reason for call): mri    Additional comments: Patient's  is calling stating that [facility] doesn't have an open MRI machine & is asking to go to [facility] now in [place], please call to advise.     Phone number to reach patient:  Other phone number:  [phone]    Best Time:  Any     Can we leave a detailed message on this number?  YES    Travel screening: Not Applicable

## 2020-09-15 NOTE — TELEPHONE ENCOUNTER
Called Radha let her know referral was faxed to [place] to [phone] this is where she wanted it to go.  Susan Louie,

## 2020-09-15 NOTE — TELEPHONE ENCOUNTER
MRI order printed and given to TC. Please fax to [facility]. There is no [place] location, Not sure if they meant [place] location instead of [place]. If they want to use [place], fax is [phone].    Faye Pfeiffer RN  [facility]

## 2020-09-23 ENCOUNTER — TRANSFERRED RECORDS (OUTPATIENT)
Dept: HEALTH INFORMATION MANAGEMENT | Facility: CLINIC | Age: 50
End: 2020-09-23

## 2020-10-03 ENCOUNTER — THERAPY VISIT (OUTPATIENT)
Dept: PHYSICAL THERAPY | Facility: CLINIC | Age: 50
End: 2020-10-03
Attending: FAMILY MEDICINE
Payer: COMMERCIAL

## 2020-10-03 DIAGNOSIS — R42 VERTIGO: ICD-10-CM

## 2020-10-03 DIAGNOSIS — H81.13 BENIGN PAROXYSMAL POSITIONAL VERTIGO, BILATERAL: ICD-10-CM

## 2020-10-03 PROCEDURE — 95992 CANALITH REPOSITIONING PROC: CPT | Mod: GP | Performed by: PHYSICAL THERAPIST

## 2020-10-03 PROCEDURE — 97161 PT EVAL LOW COMPLEX 20 MIN: CPT | Mod: GP | Performed by: PHYSICAL THERAPIST

## 2020-10-03 NOTE — PROGRESS NOTES
[facility] Initial Evaluation  Subjective:  S:  Radha Crockett is a 50 year old patient complaining of spinning.  Onset of symptoms: a month ago (august 26).  Just woke up with sx.      Is this a recurrent problem: no  Progression since onset: improved slightly  Frequency of episodes/time of day: all day  Duration of episodes: all day  Exacerbating factors: movements  Relieving factors: rest  Previous treatments:  None - meds do not work  Special tests/diagnostics performed: MRI/ CT - negative  Significant medical hx: migraines, overweight, sleep disorder  Meds: melatonin  Occupation: self employed - [employer]   Work requirements: currently unable to work  General health reported by patient: fair  Red Flags: none      O:  Cervical ROM screen: full with slight sx in bilat rot and ext  Oculomotor/gaze stability screen: + saccades  Vertebral artery test: negative  Hallpike-[place] maneuver:  R: + x 20 seconds without visible nystagmus  L: + x 25 seconds without visible nystagmus  Horizontal canal test:  R: NT  L: NT  Balance testing:  NT                      Objective:  System    Physical Exam    General     ROS    Assessment/Plan:    Patient is a 50 year old female with dizziness complaints.    Patient has the following significant findings with corresponding treatment plan.                Diagnosis 1:  BPPV  Decreased proprioception - neuro re-education and therapeutic activities  Decreased function - therapeutic activities    Therapy Evaluation Codes:   1) History comprised of:   Personal factors that impact the plan of care:      None.    Comorbidity factors that impact the plan of care are:      Dizziness, Migraines/headaches, Overweight and Sleep disorder/apnea.     Medications impacting care: None.  2) Examination of Body Systems comprised of:   Body structures and functions that impact the plan of care:      dizziness.   Activity limitations that impact the plan of care are:       Bending and Reading/Computer work.  3) Clinical presentation characteristics are:   Stable/Uncomplicated.  4) Decision-Making    Low complexity using standardized patient assessment instrument and/or measureable assessment of functional outcome.  Cumulative Therapy Evaluation is: Low complexity.    Previous and current functional limitations:  (See Goal Flow Sheet for this information)    Short term and Long term goals: (See Goal Flow Sheet for this information)     Communication ability:  Patient appears to be able to clearly communicate and understand verbal and written communication and follow directions correctly.  Treatment Explanation - The following has been discussed with the patient:   RX ordered/plan of care  Anticipated outcomes  Possible risks and side effects  This patient would benefit from PT intervention to resume normal activities.   Rehab potential is good.    Frequency:  1 X week, once daily  Duration:  for 4 weeks  Discharge Plan:  Achieve all LTG.  Independent in home treatment program.  Reach maximal therapeutic benefit.    Please refer to the daily flowsheet for treatment today, total treatment time and time spent performing 1:1 timed codes.

## 2020-10-10 ENCOUNTER — THERAPY VISIT (OUTPATIENT)
Dept: PHYSICAL THERAPY | Facility: CLINIC | Age: 50
End: 2020-10-10
Payer: COMMERCIAL

## 2020-10-10 DIAGNOSIS — H81.13 BENIGN PAROXYSMAL POSITIONAL VERTIGO, BILATERAL: Primary | ICD-10-CM

## 2020-10-10 PROCEDURE — 95992 CANALITH REPOSITIONING PROC: CPT | Mod: GP | Performed by: PHYSICAL THERAPIST

## 2020-10-10 NOTE — PROGRESS NOTES
Subjective:  HPI  Physical Exam                    Objective:  System    Physical Exam    General     ROS    Assessment/Plan:    PROGRESS  REPORT    Progress reporting period is from 10/3/2020 to 10/10/2020.       SUBJECTIVE  Subjective changes noted by patient:  Feels about the same      Changes in function:  None  Adverse reaction to treatment or activity: None    OBJECTIVE  Objective: + to L x 25 sec,  + to R x 15 sec     ASSESSMENT/PLAN  Updated problem list and treatment plan:Diagnosis 1:  BPPV  Decreased proprioception - neuro re-education and therapeutic activities  Decreased function - therapeutic activities  STG/LTGs have been met or progress has been made towards goals:  None  Assessment of Progress: The patient's condition is unchanged.  Self Management Plans:  Patient has been instructed in a home treatment program.    Radha continues to require the following intervention to meet STG and LTG's:  Referred to balance center    Recommendations:  This patient would benefit from further evaluation.  Referral to balance center.    Please refer to the daily flowsheet for treatment today, total treatment time and time spent performing 1:1 timed codes.

## 2020-10-12 DIAGNOSIS — R42 VERTIGO: Primary | ICD-10-CM

## 2020-11-27 PROBLEM — H81.13 BENIGN PAROXYSMAL POSITIONAL VERTIGO, BILATERAL: Status: RESOLVED | Noted: 2020-10-03 | Resolved: 2020-11-27

## 2020-12-14 ENCOUNTER — HEALTH MAINTENANCE LETTER (OUTPATIENT)
Age: 50
End: 2020-12-14

## 2020-12-24 NOTE — PROGRESS NOTES
Subjective     Radha Crockett is a 50 year old female who presents to clinic today for the following health issues:    HPI         Musculoskeletal problem/pain  Onset/Duration: 2 months  Description  Location: big toe - bilateral  Joint Swelling: Ankle swelling  Redness: no  Pain: YES  Warmth: no  Intensity:  mild  Progression of Symptoms:  same  Accompanying signs and symptoms:   Fevers: no  Numbness/tingling/weakness: no  History  Trauma to the area: no  Recent illness:  no  Previous similar problem: no  Previous evaluation:  no  Precipitating or alleviating factors:  Aggravating factors include: Touching area ; walking affects ankles  Therapies tried and outcome: rest/inactivity      Recurrent lower extremity edema, present for a few months, worse when standing, improves with leg elevation and activity.  Elevated blood pressure which may be associated  Renal function and heart function are normal  Does have a family history of varicose veins  Compression socks help significantly    Radha presents for HTN visit.    Currently taking no medication.  Blood pressure not being checked, has bp cuff at home.  Patient has not been exercising on a regular basis and has not been monitoring sodium intake as she has no diagnostic history of HTN.  Associated symptoms:  (-) Chest pain, (-) shortness of breath, (+) leg swelling, (-) headache, (-) vision changes.     Chronic vestibular neuritis - has been going to [facility] for treatment, improvement has been gradual, still dizzy daily and the majority of the day, medications have not been effective.  Patient has seen neurology and currently seeing ENT at [facility].    Left big toenail discoloration, vertical nature, slight hyperpigmentation nail midline, worried about cancer of the nail, unsure for how long this has been present, does wear nail polish sometimes.        BP Readings from Last 6 Encounters:   12/28/20 (!) 166/86   09/14/20 132/86   01/17/20 130/78    01/16/20 (!) 155/93   03/25/19 138/86   01/18/19 144/86       Review of Systems    ROS: 10 point ROS neg other than the symptoms noted above in the HPI.        Objective    BP (!) 166/86   Pulse 108   Temp 98  F (36.7  C) (Oral)   Resp 20   Wt 99.8 kg (220 lb)   SpO2 97%   BMI 35.51 kg/m    Body mass index is 35.51 kg/m .  Physical Exam   GENERAL: healthy, alert and no distress  EYES: Eyes grossly normal to inspection, PERRL and conjunctivae and sclerae normal  NECK: no adenopathy, no asymmetry, masses, or scars and thyroid normal to palpation  MS: Bilateral 2+ pitting edema  SKIN: slight hyperpigmentation left big toenail, vertical line  NEURO: Normal strength and tone, mentation intact and speech normal  PSYCH: mentation appears normal, affect normal/bright            Assessment & Plan       ICD-10-CM    1. Discoloration of nail  L60.8 DERMATOLOGY ADULT REFERRAL   2. Benign essential hypertension  I10 chlorthalidone (HYGROTON) 25 MG tablet   3. Bilateral chronic vestibular neuritis  H81.23      Start diuretic, check bp 2x per day and return for bp check in 1 month, low sodium diet  Compression socks, elevation, diuretic may help with swelling, regular exercise  Touch base with ENT regarding vertigo treatment            A total of 40 minutes spent face-to-face with greater than 50% of the time spent in counseling and coordinating cares of the issues above   Return in about 1 month (around 1/28/2021) for Hypertension.    Den Beckwith MD  [facility]

## 2020-12-28 ENCOUNTER — OFFICE VISIT (OUTPATIENT)
Dept: FAMILY MEDICINE | Facility: CLINIC | Age: 50
End: 2020-12-28
Payer: COMMERCIAL

## 2020-12-28 VITALS
WEIGHT: 220 LBS | DIASTOLIC BLOOD PRESSURE: 86 MMHG | HEART RATE: 108 BPM | OXYGEN SATURATION: 97 % | SYSTOLIC BLOOD PRESSURE: 166 MMHG | RESPIRATION RATE: 20 BRPM | BODY MASS INDEX: 35.51 KG/M2 | TEMPERATURE: 98 F

## 2020-12-28 DIAGNOSIS — I10 BENIGN ESSENTIAL HYPERTENSION: ICD-10-CM

## 2020-12-28 DIAGNOSIS — H81.23 BILATERAL CHRONIC VESTIBULAR NEURITIS: ICD-10-CM

## 2020-12-28 DIAGNOSIS — L60.8 DISCOLORATION OF NAIL: Primary | ICD-10-CM

## 2020-12-28 PROCEDURE — 99215 OFFICE O/P EST HI 40 MIN: CPT | Performed by: FAMILY MEDICINE

## 2020-12-28 RX ORDER — CHLORTHALIDONE 25 MG/1
25 TABLET ORAL DAILY
Qty: 30 TABLET | Refills: 2 | Status: SHIPPED | OUTPATIENT
Start: 2020-12-28 | End: 2021-10-12

## 2020-12-28 ASSESSMENT — PAIN SCALES - GENERAL: PAINLEVEL: MILD PAIN (2)

## 2020-12-28 NOTE — PATIENT INSTRUCTIONS
Patient Education     Taking a Diuretic  Your healthcare provider has prescribed a diuretic, or  water pill,  to help your body get rid of extra water and salt and maintain fluid balance. Taking your diuretic can help you feel better, breathe better, move more easily, and have more energy.      Take your medication early in the day at the same time each day.    The name of my diuretic is:   ________________________________________  Medicine tips    Read the fact sheet that comes with your medicine. It tells you when and how to take it. Ask for a medicine sheet if you don t get one.    If you take 2 or more doses each day, take the last one before dinner if you can. That way you ll get up fewer times during the night to go to the bathroom. However, make sure you have enough time between doses during the day.     If you miss a dose, take it as soon as you remember. If it's almost time for the next dose, skip the missed dose. Don't take a double dose.    If you miss 2 or more consecutive doses, call your healthcare provider. You may be at risk for fluid buildup.    For your safety    Follow your healthcare provider s guidelines for potassium intake. You may need to take a potassium supplement. Or, you may need to avoid potassium supplements, salt substitutes with potassium, or large amounts of high-potassium foods (such as bananas, potatoes, broccoli, and milk). Recommendations for potassium will depend on the type of diuretic you are prescribed along with your kidney function and other factors. Your healthcare provider will likely want to check your potassium level regularly while you are taking this medicine.    Talk to your healthcare provider about whether it's safe for you to drink alcohol while taking this medicine.    Get up slowly when you are sitting or lying down. This helps prevent dizziness and falls due to dizziness.    Ask your healthcare provider or pharmacist before you take any other prescription or  nonprescription medicine or herbal supplements. Some of them may interact with your diuretic and keep it from working correctly.    Limit exposure to sunlight. A diuretic may increase your sensitivity to the sun. Even brief sun exposure may cause skin rash, itching, redness, or other discoloration. It may also lead to severe sunburn. To protect your skin do the following:  ? Stay out of direct sunlight, especially between 10 a.m. and 2 p.m., whenever possible. The amount of direct sunlight exposure you are have can vary depending on where you live.  ? Apply a daily sunblock of at least SPF 15 (or higher) to any exposed skin, including your lips.  ? Wear protective clothing, such as a hat and sunglasses, when you are outdoors.  ? Don't use sunlamps and tanning booths.  ? Long-sleeve shirts and pants in the summer can help protect your skin.    When to seek medical advice  Call your healthcare provider right away if any of these occur:    Have diarrhea, constipation, nausea or vomiting    Lose your appetite or notice a rapid or excessive weight gain    Feel extremely tired or weak    Have shortness of breath or trouble breathing or swallowing    Have numbness or tingling in your hands, feet, or lips or a ringing in your ears    Feel lightheaded when getting up after sitting or lying down    Have headaches, blurred vision, or feel a sense of confusion    Have muscle cramps or joint pain    Have chest pains or changes in your heartbeat    Have an excessive thirst or a dry mouth    Notice a skin rash    Gain more than 2 pounds in 1 day or 4 pounds in 1 week. Ask your healthcare provider for his or her specific direction.    Have increased swelling in your legs, feet, or belly (abdomen)    Have rapid or irregular heartbeats    Have any other unusual symptoms  Eve last reviewed this educational content on 7/1/2019 2000-2020 [employer]. [address] [facility], [place] [address]. All rights reserved.  This information is not intended as a substitute for professional medical care. Always follow your healthcare professional's instructions.           Patient Education     Discharge Instructions: Taking Diuretics   Your doctor prescribed a type of medicine called a diuretic for you. Diuretics help reduce the amount of water in the body. They make you pee more often, flushing water and salts from your body. Diuretics are a treatment for high blood pressure (hypertension) and conditions such as heart failure, liver failure, and swelling (edema).     The name of your diuretic is:  Home care    Follow the fact sheet that came with your medicine. It tells you when and how to take your medicine. Ask for a sheet if you didn t get one.    Tell your doctor if you are taking any other medicines, including herbal remedies or over-the-counter medicines.    Plan your activities in advance until you know how this medicine affects you.    Take your diuretic in the morning. This medicine makes you pee more. If you take it in the morning, you may not need to use the bathroom during the night. That way, the medicine won t interfere with a good night s sleep.    Take your medicine exactly as directed, even if you feel fine.    Learn to take your own pulse. Keep a record of your results. Ask your doctor which readings mean that you need medical attention.    Possible side effects  Tell your doctor if you have any of these side effects. Don t stop taking the medicine until your doctor tells you to. Mild side effects include the following:    Dizziness or lightheadedness    Headache    Loss of appetite    Diarrhea    Increased sensitivity to light    Nervousness    Stomach cramps with mild pain  When to call your healthcare provider  Call your healthcare provider right away if any of these occur:    Blood in your urine or stool or black, tarry stool    Cough or hoarseness    Fever or chills    Lower back or side pain, or muscle cramps or  pain    Trouble peeing or pain when you pee    Pinpoint red spots on skin    Ringing or buzzing in your ears or any hearing loss    Skin rash or hives    Severe stomach pain with nausea and vomiting    Unusual bleeding or bruising    Yellow vision or yellowing of your eyes or skin (jaundice)    Increased thirst    Irregular heartbeat or weak pulse    Palpitations  Eve last reviewed this educational content on 10/1/2019    4941-2166 [employer]. [address] [place] [address]. All rights reserved. This information is not intended as a substitute for professional medical care. Always follow your healthcare professional's instructions.

## 2020-12-31 PROBLEM — R42 VERTIGO: Status: ACTIVE | Noted: 2020-08-26

## 2020-12-31 PROBLEM — H81.23: Status: ACTIVE | Noted: 2020-12-31

## 2021-02-11 ENCOUNTER — OFFICE VISIT (OUTPATIENT)
Dept: OBGYN | Facility: CLINIC | Age: 51
End: 2021-02-11
Payer: COMMERCIAL

## 2021-02-11 VITALS
BODY MASS INDEX: 38.04 KG/M2 | HEART RATE: 92 BPM | HEIGHT: 65 IN | DIASTOLIC BLOOD PRESSURE: 93 MMHG | SYSTOLIC BLOOD PRESSURE: 154 MMHG | OXYGEN SATURATION: 98 % | WEIGHT: 228.3 LBS

## 2021-02-11 DIAGNOSIS — Z01.419 ENCOUNTER FOR GYNECOLOGICAL EXAMINATION WITHOUT ABNORMAL FINDING: Primary | ICD-10-CM

## 2021-02-11 DIAGNOSIS — Z12.4 PAP SMEAR FOR CERVICAL CANCER SCREENING: ICD-10-CM

## 2021-02-11 DIAGNOSIS — N90.89 VULVAR IRRITATION: ICD-10-CM

## 2021-02-11 PROCEDURE — 87624 HPV HI-RISK TYP POOLED RSLT: CPT | Performed by: OBSTETRICS & GYNECOLOGY

## 2021-02-11 PROCEDURE — G0145 SCR C/V CYTO,THINLAYER,RESCR: HCPCS | Performed by: OBSTETRICS & GYNECOLOGY

## 2021-02-11 PROCEDURE — 99396 PREV VISIT EST AGE 40-64: CPT | Performed by: OBSTETRICS & GYNECOLOGY

## 2021-02-11 RX ORDER — FLUCONAZOLE 150 MG/1
150 TABLET ORAL ONCE
Qty: 1 TABLET | Refills: 3 | Status: SHIPPED | OUTPATIENT
Start: 2021-02-11 | End: 2021-02-11

## 2021-02-11 ASSESSMENT — MIFFLIN-ST. JEOR: SCORE: 1654.05

## 2021-02-11 NOTE — PROGRESS NOTES
Radha Crockett is a 50 year old female , who presents for annual exam.   No unusual bleeding, no incontinence, or unusual discharge.   She has had some vulvar irritation.  With menses.   Last cholesterol:   Recent Labs   Lab Test 17  1147 06/17/15  0954   CHOL 200* 219*   HDL 54 45*   * 151*   TRIG 82 117   CHOLHDLRATIO  --  4.9     Past Medical History:   Diagnosis Date     Bilateral chronic vestibular neuritis 2020     Dependent edema 2015     Miscarriage     2 in past       Past Surgical History:   Procedure Laterality Date     NOSE SURGERY  2009    cosmetic reasons       OB History    Para Term  AB Living   4 0 0 0 4 0   SAB TAB Ectopic Multiple Live Births   3 1 0 0 0      # Outcome Date GA Lbr Ezequiel/2nd Weight Sex Delivery Anes PTL Lv   4 SAB            3 TAB            2 SAB            1 SAB                Gyn History:  Gynecological History         Patient's last menstrual period was 2021 (approximate).          history of abnormal pap smear:   Last pap:   Lab Results   Component Value Date    PAP OTHER-NIL, See Result 2015           Current Outpatient Medications   Medication Sig Dispense Refill     IBUPROFEN PO Take 200 mg by mouth every 6 hours as needed for moderate pain       melatonin 1 MG TABS Take 1 mg by mouth nightly as needed for sleep       chlorthalidone (HYGROTON) 25 MG tablet Take 1 tablet (25 mg) by mouth daily (Patient not taking: Reported on 2021) 30 tablet 2     ondansetron (ZOFRAN) 4 MG tablet Take 4 mg by mouth         Allergies   Allergen Reactions     Clomid [Clomiphene Citrate] Other (See Comments)     Presumed Ovarian hyperstimulation       Social History     Socioeconomic History     Marital status:      Spouse name: Not on file     Number of children: Not on file     Years of education: Not on file     Highest education level: Not on file   Occupational History     Not on file   Social Needs     Financial  "resource strain: Not on file     Food insecurity     Worry: Not on file     Inability: Not on file     Transportation needs     Medical: Not on file     Non-medical: Not on file   Tobacco Use     Smoking status: Former Smoker     Packs/day: 0.50     Years: 20.00     Pack years: 10.00     Types: Cigarettes     Quit date: 7/12/2010     Years since quitting: 10.5     Smokeless tobacco: Former User     Quit date: 12/3/2010   Substance and Sexual Activity     Alcohol use: Yes     Alcohol/week: 0.0 standard drinks     Comment: seldom use     Drug use: No     Sexual activity: Yes     Partners: Male   Lifestyle     Physical activity     Days per week: Not on file     Minutes per session: Not on file     Stress: Not on file   Relationships     Social connections     Talks on phone: Not on file     Gets together: Not on file     Attends Jew service: Not on file     Active member of club or organization: Not on file     Attends meetings of clubs or organizations: Not on file     Relationship status: Not on file     Intimate partner violence     Fear of current or ex partner: Not on file     Emotionally abused: Not on file     Physically abused: Not on file     Forced sexual activity: Not on file   Other Topics Concern     Parent/sibling w/ CABG, MI or angioplasty before 65F 55M? Not Asked   Social History Narrative    Lives at home with her .         Family History   Problem Relation Age of Onset     Myocardial Infarction Father         at age 63 years      Diabetes Father      Gastrointestinal Disease Mother         GI bleeding, source unknown     Vascular Disease Mother      Leukemia Paternal Aunt          ROS:  All negative except as above.      EXAM:  BP (!) 154/93 (BP Location: Right arm, Cuff Size: Adult Large)   Pulse 92   Ht 1.647 m (5' 4.85\")   Wt 103.6 kg (228 lb 4.8 oz)   LMP 01/26/2021 (Approximate)   SpO2 98%   Breastfeeding No   BMI 38.17 kg/m    General:  WNWD female, NAD  Alert  Oriented x " 3  Gait:  Normal  Skin:  Normal skin turgor  Neurologic:  CN grossly intact, good sensation.    HEENT:  NC/AT, EOMI  Neck:  No masses palpated, symmetrical, carotids +2/4, no bruits heard  Heart:  RRR  Lungs:  Clear   Breasts:  Symmetrical, no dimpling noted, no masses palpated, no discharge expressed  Abdomen:  Non-tender, non-distended.  Vulva: No external lesions, normal hair distribution, no adenopathy  BUS:  Normal, no masses noted  Urethra:  No hypermobility noted  Urethral meatus:  No masses noted  Vagina: Moist, pink, no abnormal discharge, well rugated, no lesions  Cervix: Smooth, pink, no visible lesions  Uterus: Normal size, anteverted, non-tender, mobile  Ovaries: No mass, non-tender, mobile  Perianal:  No masses noted.    Extremities:  No clubbing, cyanosis, or edema      ASSESSMENT/PLAN   Annual examination with pap smear  Mammogram in 2 weeks.   Change from pads to tampons to see if the irritation is from pads.  Will give diflucan prescription also.   Low fat diet, weight bearing exercises and self breast exams on a monthly basis have been recommended.  I have discussed with patient the risks, benefits, medications, treatment options and modalities.   I have instructed the patient to call or schedule a follow-up appointment if any problems.

## 2021-02-16 LAB
COPATH REPORT: NORMAL
PAP: NORMAL

## 2021-02-18 LAB
FINAL DIAGNOSIS: NORMAL
HPV HR 12 DNA CVX QL NAA+PROBE: NEGATIVE
HPV16 DNA SPEC QL NAA+PROBE: NEGATIVE
HPV18 DNA SPEC QL NAA+PROBE: NEGATIVE
SPECIMEN DESCRIPTION: NORMAL
SPECIMEN SOURCE CVX/VAG CYTO: NORMAL

## 2021-02-26 ENCOUNTER — ANCILLARY PROCEDURE (OUTPATIENT)
Dept: MAMMOGRAPHY | Facility: CLINIC | Age: 51
End: 2021-02-26
Attending: OBSTETRICS & GYNECOLOGY
Payer: COMMERCIAL

## 2021-02-26 DIAGNOSIS — Z12.31 VISIT FOR SCREENING MAMMOGRAM: ICD-10-CM

## 2021-02-26 PROCEDURE — 77063 BREAST TOMOSYNTHESIS BI: CPT | Performed by: RADIOLOGY

## 2021-02-26 PROCEDURE — 77067 SCR MAMMO BI INCL CAD: CPT | Performed by: RADIOLOGY

## 2021-08-25 NOTE — TELEPHONE ENCOUNTER
".  [facility]: Nurse Triage Note  SITUATION/BACKGROUND                                                      Radha Crockett is a 49 year old female who calls to report having intermittent chest pressure and burning x 3 weeks. Sensation Increases after she eats. Admits to laying down after eating.   Denies any continuous or intermittent pain, tightness, pressure, or discomfort accompanied by SOB, dizziness or weakness, cool moist skin; N/V; pain in neck, shoulders, jaw, back or arms; blue or grey face, lips, earlobes or fingernails.    Patient has gained over 100 lbs over the past 5 years. Has experienced lower extremity edema and feels that she needs to have an appointment with Cardiologist for evaluation. She has not discussed symptoms with PCP. Slight discomfort at this time. Rated \"discomfort at 3/10.  This nurse contacted [facility] and  appointment with PCP has been scheduled for evaluation of symptoms today at 3:40.     RECOMMENDED DISPOSITION: appointment scheduled for evaluation with PCP today.  Seek ED care with continuous or intermittent pain, tightness, pressure, or discomfort accompanied by SOB, dizziness or weakness, cool moist skin; N/V; pain in neck, shoulders, jaw, back or arms; blue or grey face, lips, earlobes or fingernails.    Will comply with recommendation: Yes    If further questions/concerns or if symptoms do not improve, worsen or new symptoms develop, call your PCP or [phone] to talk with the Resident on call, as soon as possible.    Guideline used: chest pain  Telephone Triage Protocols for Nurses, Fifth Edition, Diamond Travis, SANDY, RN  "
done

## 2021-09-21 ENCOUNTER — OFFICE VISIT (OUTPATIENT)
Dept: FAMILY MEDICINE | Facility: CLINIC | Age: 51
End: 2021-09-21
Payer: COMMERCIAL

## 2021-09-21 VITALS
DIASTOLIC BLOOD PRESSURE: 86 MMHG | SYSTOLIC BLOOD PRESSURE: 136 MMHG | WEIGHT: 220 LBS | BODY MASS INDEX: 36.78 KG/M2 | OXYGEN SATURATION: 99 % | TEMPERATURE: 98.1 F | HEART RATE: 94 BPM

## 2021-09-21 DIAGNOSIS — I87.2 VENOUS (PERIPHERAL) INSUFFICIENCY: ICD-10-CM

## 2021-09-21 DIAGNOSIS — M79.89 LEG SWELLING: ICD-10-CM

## 2021-09-21 DIAGNOSIS — R19.8 PROTUBERANT ABDOMEN: ICD-10-CM

## 2021-09-21 DIAGNOSIS — R35.0 URINARY FREQUENCY: Primary | ICD-10-CM

## 2021-09-21 DIAGNOSIS — L29.9 ITCHING: ICD-10-CM

## 2021-09-21 DIAGNOSIS — R53.83 FATIGUE, UNSPECIFIED TYPE: ICD-10-CM

## 2021-09-21 DIAGNOSIS — Z13.220 ENCOUNTER FOR LIPID SCREENING FOR CARDIOVASCULAR DISEASE: ICD-10-CM

## 2021-09-21 DIAGNOSIS — R10.9 ABDOMINAL DISCOMFORT: ICD-10-CM

## 2021-09-21 DIAGNOSIS — R73.9 HYPERGLYCEMIA: ICD-10-CM

## 2021-09-21 DIAGNOSIS — Z12.11 COLON CANCER SCREENING: ICD-10-CM

## 2021-09-21 DIAGNOSIS — Z13.6 ENCOUNTER FOR LIPID SCREENING FOR CARDIOVASCULAR DISEASE: ICD-10-CM

## 2021-09-21 LAB
ALBUMIN UR-MCNC: NEGATIVE MG/DL
APPEARANCE UR: CLEAR
BASOPHILS # BLD AUTO: 0 10E3/UL (ref 0–0.2)
BASOPHILS NFR BLD AUTO: 0 %
BILIRUB UR QL STRIP: NEGATIVE
COLOR UR AUTO: YELLOW
EOSINOPHIL # BLD AUTO: 0.1 10E3/UL (ref 0–0.7)
EOSINOPHIL NFR BLD AUTO: 1 %
ERYTHROCYTE [DISTWIDTH] IN BLOOD BY AUTOMATED COUNT: 13.9 % (ref 10–15)
GLUCOSE UR STRIP-MCNC: NEGATIVE MG/DL
HBA1C MFR BLD: 5.9 % (ref 0–5.6)
HCT VFR BLD AUTO: 42.5 % (ref 35–47)
HGB BLD-MCNC: 13.7 G/DL (ref 11.7–15.7)
HGB UR QL STRIP: NEGATIVE
KETONES UR STRIP-MCNC: NEGATIVE MG/DL
LEUKOCYTE ESTERASE UR QL STRIP: NEGATIVE
LYMPHOCYTES # BLD AUTO: 3.6 10E3/UL (ref 0.8–5.3)
LYMPHOCYTES NFR BLD AUTO: 35 %
MCH RBC QN AUTO: 27.1 PG (ref 26.5–33)
MCHC RBC AUTO-ENTMCNC: 32.2 G/DL (ref 31.5–36.5)
MCV RBC AUTO: 84 FL (ref 78–100)
MONOCYTES # BLD AUTO: 0.7 10E3/UL (ref 0–1.3)
MONOCYTES NFR BLD AUTO: 7 %
NEUTROPHILS # BLD AUTO: 5.8 10E3/UL (ref 1.6–8.3)
NEUTROPHILS NFR BLD AUTO: 57 %
NITRATE UR QL: NEGATIVE
PH UR STRIP: 6 [PH] (ref 5–7)
PLATELET # BLD AUTO: 300 10E3/UL (ref 150–450)
RBC # BLD AUTO: 5.05 10E6/UL (ref 3.8–5.2)
RBC #/AREA URNS AUTO: ABNORMAL /HPF
SP GR UR STRIP: >=1.03 (ref 1–1.03)
SQUAMOUS #/AREA URNS AUTO: ABNORMAL /LPF
UROBILINOGEN UR STRIP-ACNC: 0.2 E.U./DL
WBC # BLD AUTO: 10.3 10E3/UL (ref 4–11)
WBC #/AREA URNS AUTO: ABNORMAL /HPF

## 2021-09-21 PROCEDURE — 81001 URINALYSIS AUTO W/SCOPE: CPT | Performed by: FAMILY MEDICINE

## 2021-09-21 PROCEDURE — 82306 VITAMIN D 25 HYDROXY: CPT | Performed by: FAMILY MEDICINE

## 2021-09-21 PROCEDURE — 86706 HEP B SURFACE ANTIBODY: CPT | Performed by: FAMILY MEDICINE

## 2021-09-21 PROCEDURE — 87340 HEPATITIS B SURFACE AG IA: CPT | Performed by: FAMILY MEDICINE

## 2021-09-21 PROCEDURE — 86803 HEPATITIS C AB TEST: CPT | Performed by: FAMILY MEDICINE

## 2021-09-21 PROCEDURE — 82977 ASSAY OF GGT: CPT | Performed by: FAMILY MEDICINE

## 2021-09-21 PROCEDURE — 86704 HEP B CORE ANTIBODY TOTAL: CPT | Performed by: FAMILY MEDICINE

## 2021-09-21 PROCEDURE — 99214 OFFICE O/P EST MOD 30 MIN: CPT | Performed by: FAMILY MEDICINE

## 2021-09-21 PROCEDURE — 83690 ASSAY OF LIPASE: CPT | Performed by: FAMILY MEDICINE

## 2021-09-21 PROCEDURE — 80050 GENERAL HEALTH PANEL: CPT | Performed by: FAMILY MEDICINE

## 2021-09-21 PROCEDURE — 83036 HEMOGLOBIN GLYCOSYLATED A1C: CPT | Performed by: FAMILY MEDICINE

## 2021-09-21 PROCEDURE — 36415 COLL VENOUS BLD VENIPUNCTURE: CPT | Performed by: FAMILY MEDICINE

## 2021-09-21 PROCEDURE — 80061 LIPID PANEL: CPT | Performed by: FAMILY MEDICINE

## 2021-09-21 NOTE — PROGRESS NOTES
Assessment & Plan       ICD-10-CM    1. Urinary frequency  R35.0 UA with Microscopic reflex to Culture - lab collect     UA with Microscopic reflex to Culture - lab collect     Urine Microscopic   2. Itching  L29.9 Comprehensive metabolic panel (BMP + Alb, Alk Phos, ALT, AST, Total. Bili, TP)     GGT     Hepatitis C Screen Reflex to HCV RNA Quant and Genotype     Hepatitis B Surface Antibody     Hepatitis B core antibody     Hepatitis B surface antigen     Hepatitis B surface antigen     Hepatitis B core antibody     Hepatitis B Surface Antibody     Hepatitis C Screen Reflex to HCV RNA Quant and Genotype     GGT     Comprehensive metabolic panel (BMP + Alb, Alk Phos, ALT, AST, Total. Bili, TP)   3. Leg swelling  M79.89    4. Venous (peripheral) insufficiency  I87.2    5. Hyperglycemia  R73.9 Hemoglobin A1c     Hemoglobin A1c   6. Encounter for lipid screening for cardiovascular disease  Z13.220 Lipid panel reflex to direct LDL Non-fasting    Z13.6 Lipid panel reflex to direct LDL Non-fasting   7. Protuberant abdomen  R19.8 Lipase     Hepatitis C Screen Reflex to HCV RNA Quant and Genotype     Hepatitis B Surface Antibody     Hepatitis B core antibody     Hepatitis B surface antigen     Hepatitis B surface antigen     Hepatitis B core antibody     Hepatitis B Surface Antibody     Hepatitis C Screen Reflex to HCV RNA Quant and Genotype     Lipase     CANCELED: CT Abdomen Pelvis w Contrast   8. Abdominal discomfort  R10.9 CANCELED: CT Abdomen Pelvis w Contrast   9. Fatigue, unspecified type  R53.83 TSH with free T4 reflex     Vitamin D deficiency screening     CBC with platelets and differential     CBC with platelets and differential     Vitamin D deficiency screening     TSH with free T4 reflex   10. Colon cancer screening  Z12.11 BIBI([employer])         Review of external notes as documented elsewhere in note      Patient Instructions   Radha    It was a pleasure seeing you in clinic today.  Here's the  plan:    1. Abdominal distension - blood tests today, schedule ultrasound abdomen  2. Acid reflux in the morning - take tums, pepcid, or prilosec before bed.  Don't eat large meals after 7pm  3. Urinary frequency - urinalysis today  4. Colon cancer screening - cologuard ordered    Let me know if you have questions.    Den Beckwith MD        Return in about 2 weeks (around 10/5/2021) for For Re-Assessment.    Den Beckwith MD  [facility] MINA Garcia is a 51 year old who presents for the following health issues     HPI     Chief Complaint   Patient presents with     Blood Draw     Liver, cholesterol and diabetes      Stomach feels hard/firm/full  BM's daily, mushy stool, not entirely solid, a little bit light  Right foot swollen and tingling in toes  Frequent urination  Itchy skin on back, no rash present  Possible acid reflux in the morning  Eats dinner after midnight then goes to bed immediately after    Right foot swelling  Upon standing  Painful  Previous imaging was not concerning          Review of Systems   Constitutional, HEENT, cardiovascular, pulmonary, gi and gu systems are negative, except as otherwise noted.      Objective    /86   Pulse 94   Temp 98.1  F (36.7  C) (Temporal)   Wt 99.8 kg (220 lb)   SpO2 99%   BMI 36.78 kg/m    Body mass index is 36.78 kg/m .  Physical Exam   GENERAL: healthy, alert and no distress  EYES: Eyes grossly normal to inspection, PERRL and conjunctivae and sclerae normal  NECK: no adenopathy, no asymmetry, masses, or scars and thyroid normal to palpation  ABDOMEN: protuberant, soft, nontender, no hepatosplenomegaly, no masses and bowel sounds normal  SKIN: no suspicious lesions or rashes  PSYCH: mentation appears normal, affect normal/bright

## 2021-09-21 NOTE — PATIENT INSTRUCTIONS
Radha    It was a pleasure seeing you in clinic today.  Here's the plan:    1. Abdominal distension - blood tests today, schedule CT abdomen  2. Acid reflux in the morning - take tums, pepcid, or prilosec before bed.  Don't eat large meals after 7pm  3. Urinary frequency - urinalysis today  4. Colon cancer screening - cologuard ordered    Let me know if you have questions.    Den Beckwith MD

## 2021-09-22 ENCOUNTER — TELEPHONE (OUTPATIENT)
Dept: FAMILY MEDICINE | Facility: CLINIC | Age: 51
End: 2021-09-22

## 2021-09-22 DIAGNOSIS — R19.00 PELVIC FULLNESS: ICD-10-CM

## 2021-09-22 DIAGNOSIS — R19.8 GENERALIZED ABDOMINAL FULLNESS: Primary | ICD-10-CM

## 2021-09-22 LAB
ALBUMIN SERPL-MCNC: 3.7 G/DL (ref 3.4–5)
ALP SERPL-CCNC: 117 U/L (ref 40–150)
ALT SERPL W P-5'-P-CCNC: 54 U/L (ref 0–50)
ANION GAP SERPL CALCULATED.3IONS-SCNC: 3 MMOL/L (ref 3–14)
AST SERPL W P-5'-P-CCNC: 21 U/L (ref 0–45)
BILIRUB SERPL-MCNC: 0.4 MG/DL (ref 0.2–1.3)
BUN SERPL-MCNC: 18 MG/DL (ref 7–30)
CALCIUM SERPL-MCNC: 9.6 MG/DL (ref 8.5–10.1)
CHLORIDE BLD-SCNC: 109 MMOL/L (ref 94–109)
CHOLEST SERPL-MCNC: 240 MG/DL
CO2 SERPL-SCNC: 28 MMOL/L (ref 20–32)
CREAT SERPL-MCNC: 0.8 MG/DL (ref 0.52–1.04)
DEPRECATED CALCIDIOL+CALCIFEROL SERPL-MC: 21 UG/L (ref 20–75)
FASTING STATUS PATIENT QL REPORTED: NO
GFR SERPL CREATININE-BSD FRML MDRD: 86 ML/MIN/1.73M2
GGT SERPL-CCNC: 42 U/L (ref 0–40)
GLUCOSE BLD-MCNC: 99 MG/DL (ref 70–99)
HBV CORE AB SERPL QL IA: NONREACTIVE
HBV SURFACE AB SERPL IA-ACNC: 0.38 M[IU]/ML
HBV SURFACE AG SERPL QL IA: NONREACTIVE
HCV AB SERPL QL IA: NONREACTIVE
HDLC SERPL-MCNC: 44 MG/DL
LDLC SERPL CALC-MCNC: 169 MG/DL
LIPASE SERPL-CCNC: 190 U/L (ref 73–393)
NONHDLC SERPL-MCNC: 196 MG/DL
POTASSIUM BLD-SCNC: 5.2 MMOL/L (ref 3.4–5.3)
PROT SERPL-MCNC: 7.9 G/DL (ref 6.8–8.8)
SODIUM SERPL-SCNC: 140 MMOL/L (ref 133–144)
TRIGL SERPL-MCNC: 134 MG/DL
TSH SERPL DL<=0.005 MIU/L-ACNC: 1.79 MU/L (ref 0.4–4)

## 2021-09-22 NOTE — TELEPHONE ENCOUNTER
Left message on patient's VM requesting a return call to [facility] [phone]       Den Key MD   9/22/2021 12:23 PM CDT         **Please call patient with results and imaging change**  I tried calling home phone # but no answer.     Bety Garcia     Overall your lab results are not concerning.  You A1C is slightly elevated into the pre-diabetic range, which can be improved with healthy diet/weight loss.  One change I would like to make is to do an ultrasound of your abdomen instead of a CT scan.  An ultrasound is much less radiation exposure and can tell us similar information.  I'll change the order in the system for you.  Let me know if you have questions.     MD Felix Bonilla RN  [facility]

## 2021-09-22 NOTE — LETTER
October 6, 2021      Radha Crockett  [address] [place] [address]        Dear Radha,     We have been trying to reach you. Please call us to schedule a video visit with Den Falcon to discuss your concerns about your liver/kidney. We could discuss the ultrasound results during that appointment as well.            Sincerely,        Den Beckwith MD

## 2021-09-23 NOTE — TELEPHONE ENCOUNTER
Patient returned call to receive test results from 09/21/2021. Relayed provider's message as written below. She verbalized understanding, however states that she is concerned specifically about her liver/kidneys. She requested that this message be passed along to Dr. Beckwith to advise. Should patient schedule f/u visit to discuss results?    ANA PAULA QuijanoN RN  [facility] [place]

## 2021-09-24 NOTE — TELEPHONE ENCOUNTER
I think a visit would be a good idea.  She should also schedule her ultrasound.    Den Beckwith MD

## 2021-09-24 NOTE — TELEPHONE ENCOUNTER
Left message on answering machine for patient to call back to the nurse at [phone].    If patient returns call, please assist with scheduling follow up visit and provide phone number for scheduling ultrasound    Gladys Katz RN  [facility]

## 2021-09-27 ENCOUNTER — ANCILLARY PROCEDURE (OUTPATIENT)
Dept: ULTRASOUND IMAGING | Facility: CLINIC | Age: 51
End: 2021-09-27
Attending: FAMILY MEDICINE
Payer: COMMERCIAL

## 2021-09-27 DIAGNOSIS — R19.00 PELVIC FULLNESS: ICD-10-CM

## 2021-09-27 DIAGNOSIS — R19.8 GENERALIZED ABDOMINAL FULLNESS: ICD-10-CM

## 2021-09-27 LAB — COLOGUARD-ABSTRACT: NEGATIVE

## 2021-09-27 PROCEDURE — 76700 US EXAM ABDOM COMPLETE: CPT | Performed by: RADIOLOGY

## 2021-09-27 NOTE — TELEPHONE ENCOUNTER
Patient already has US scheduled for today (soon at 12:45 PM).  Please call patient to schedule a video visit with Den Falcon to discuss her concerns about her liver/kidney. Could discuss the US results during that appointment as well.     Felix Tobin RN  [facility]

## 2021-09-29 NOTE — TELEPHONE ENCOUNTER
LVM for pt to give us a call back, please assist pt with scheduling a virtual visit with provider to discuss concerns.

## 2021-10-02 ENCOUNTER — HEALTH MAINTENANCE LETTER (OUTPATIENT)
Age: 51
End: 2021-10-02

## 2021-10-06 ENCOUNTER — MYC MEDICAL ADVICE (OUTPATIENT)
Dept: FAMILY MEDICINE | Facility: CLINIC | Age: 51
End: 2021-10-06

## 2021-10-08 NOTE — TELEPHONE ENCOUNTER
Please let patient know result notes were sent through datango.  I recommend setting up a virtual visit to discuss if she would like to.    Den Beckwith MD

## 2021-10-08 NOTE — TELEPHONE ENCOUNTER
Patient notified of provider message as written. Patient verbalized understanding. Virtual follow up appointment scheduled. Patient requests phone visit.     Gladys Katz RN

## 2021-10-12 ENCOUNTER — VIRTUAL VISIT (OUTPATIENT)
Dept: FAMILY MEDICINE | Facility: CLINIC | Age: 51
End: 2021-10-12
Payer: COMMERCIAL

## 2021-10-12 DIAGNOSIS — R19.8 GENERALIZED ABDOMINAL FULLNESS: Primary | ICD-10-CM

## 2021-10-12 DIAGNOSIS — R14.0 ABDOMINAL DISTENSION: ICD-10-CM

## 2021-10-12 PROCEDURE — 99214 OFFICE O/P EST MOD 30 MIN: CPT | Mod: 95 | Performed by: FAMILY MEDICINE

## 2021-10-12 NOTE — PATIENT INSTRUCTIONS
Radha    It was a pleasure seeing you in clinic today.  Here's the plan:    1. Abdominal distension - CT abdomen/pelvis.  Call [phone] to schedule.  [facility] [facility].  2. Fatty liver - weight loss, regular exercise, mediterranean diet  3. Prediabetes - same as above, recheck A1C in 3 months    Let me know if you have questions.    Den Beckwith MD          Healthy Lifestyle   Nutrition and healthy eating: The Mediterranean Diet  Ready to switch to a more heart-healthy diet? Here's how to get started with the Mediterranean diet.  By [facility] Staff   If you're looking for a heart-healthy eating plan, the Mediterranean diet might be right for you.  The Mediterranean diet blends the basics of healthy eating with the traditional flavors and cooking methods of the Mediterranean.  Interest in the Mediterranean diet began in the 1960s with the observation that coronary heart disease caused fewer deaths in Mediterranean countries, such as [place] and [place], than in the [place] and northern [place]. Subsequent studies found that the Mediterranean diet is associated with reduced risk factors for cardiovascular disease.  The Mediterranean diet is one of the healthy eating plans recommended by the Dietary Guidelines for Americans to promote health and prevent chronic disease.  It is also recognized by the World Health Organization as a healthy and sustainable dietary pattern and as an intangible cultural asset by the United National Educational, Scientific and Cultural Organization.  The Mediterranean diet is a way of eating based on the traditional cuisine of countries bordering the [place] Sea. While there is no single definition of the Mediterranean diet, it is typically high in vegetables, fruits, whole grains, beans, nut and seeds, and olive oil.  The main components of Mediterranean diet include:    Daily consumption of vegetables, fruits, whole grains and healthy fats     Weekly  "intake of fish, poultry, beans and eggs     Moderate portions of dairy products     Limited intake of red meat  Other important elements of the Mediterranean diet are sharing meals with family and friends, enjoying a glass of red wine and being physically active.  The foundation of the Mediterranean diet is vegetables, fruits, herbs, nuts, beans and whole grains. Meals are built around these plant-based foods. Moderate amounts of dairy, poultry and eggs are also central to the Mediterranean Diet, as is seafood. In contrast, red meat is eaten only occasionally.  Healthy fats are a mainstay of the Mediterranean diet. They're eaten instead of less healthy fats, such as saturated and trans fats, which contribute to heart disease.  Olive oil is the primary source of added fat in the Mediterranean diet. Olive oil provides monounsaturated fat, which has been found to lower total cholesterol and low-density lipoprotein (LDL or \"bad\") cholesterol levels. Nuts and seeds also contain monounsaturated fat.  Fish are also important in the Mediterranean diet. Fatty fish -- such as mackerel, herring, sardines, albacore tuna, salmon and lake trout -- are rich in omega-3 fatty acids, a type of polyunsaturated fat that may reduce inflammation in the body. Omega-3 fatty acids also help decrease triglycerides, reduce blood clotting, and decrease the risk of stroke and heart failure.  The Mediterranean diet typically allows red wine in moderation. Although alcohol has been associated with a reduced risk of heart disease in some studies, it's by no means risk free. The Dietary Guidelines for Americans caution against beginning to drink or drinking more often on the basis of potential health benefits.  Interested in trying the Mediterranean diet? These tips will help you get started:    Eat more fruits and vegetables. Aim for 7 to 10 servings a day of fruit and vegetables.     Opt for whole grains. Switch to whole-grain bread, cereal and " pasta. [place] with other whole grains, such as bulgur and farro.     Use healthy fats. Try olive oil as a replacement for butter when cooking. Instead of putting butter or margarine on bread, try dipping it in flavored olive oil.     Eat more seafood. Eat fish twice a week. Fresh or water-packed tuna, salmon, trout, mackerel and herring are healthy choices. Grilled fish tastes good and requires little cleanup. Avoid deep-fried fish.     Reduce red meat. Substitute fish, poultry or beans for meat. If you eat meat, make sure it's lean and keep portions small.     Enjoy some dairy. Eat low-fat Greek or plain yogurt and small amounts of a variety of cheeses.     Spice it up. Herbs and spices boost flavor and lessen the need for salt.  The Mediterranean diet is a delicious and healthy way to eat. Many people who switch to this style of eating say they'll never eat any other way.

## 2021-10-12 NOTE — PROGRESS NOTES
Radha is a 51 year old who is being evaluated via a billable telephone visit.      What phone number would you like to be contacted at? [phone]  How would you like to obtain your AVS? MyChart    Assessment & Plan       ICD-10-CM    1. Generalized abdominal fullness  R19.8 CT Abdomen Pelvis w/o Contrast   2. Abdominal distension  R14.0 CT Abdomen Pelvis w/o Contrast         Review of external notes as documented elsewhere in note        Patient Instructions   Radha    It was a pleasure seeing you in clinic today.  Here's the plan:    1. Abdominal distension - CT abdomen/pelvis.  Call [phone] to schedule.  [place] [facility].  2. Fatty liver - weight loss, regular exercise, mediterranean diet  3. Prediabetes - same as above, recheck A1C in 3 months    Let me know if you have questions.    Den Beckwith MD          Healthy Lifestyle   Nutrition and healthy eating: The Mediterranean Diet  Ready to switch to a more heart-healthy diet? Here's how to get started with the Mediterranean diet.  By [facility] Staff   If you're looking for a heart-healthy eating plan, the Mediterranean diet might be right for you.  The Mediterranean diet blends the basics of healthy eating with the traditional flavors and cooking methods of the Mediterranean.  Interest in the Mediterranean diet began in the 1960s with the observation that coronary heart disease caused fewer deaths in Mediterranean countries, such as [place] and [place], than in the [place] and northern [place]. Subsequent studies found that the Mediterranean diet is associated with reduced risk factors for cardiovascular disease.  The Mediterranean diet is one of the healthy eating plans recommended by the Dietary Guidelines for Americans to promote health and prevent chronic disease.  It is also recognized by the World Health Organization as a healthy and sustainable dietary pattern and as an intangible cultural asset by the United National  "Educational, Scientific and Cultural Organization.  The Mediterranean diet is a way of eating based on the traditional cuisine of countries bordering the [place] Sea. While there is no single definition of the Mediterranean diet, it is typically high in vegetables, fruits, whole grains, beans, nut and seeds, and olive oil.  The main components of Mediterranean diet include:    Daily consumption of vegetables, fruits, whole grains and healthy fats     Weekly intake of fish, poultry, beans and eggs     Moderate portions of dairy products     Limited intake of red meat  Other important elements of the Mediterranean diet are sharing meals with family and friends, enjoying a glass of red wine and being physically active.  The foundation of the Mediterranean diet is vegetables, fruits, herbs, nuts, beans and whole grains. Meals are built around these plant-based foods. Moderate amounts of dairy, poultry and eggs are also central to the Mediterranean Diet, as is seafood. In contrast, red meat is eaten only occasionally.  Healthy fats are a mainstay of the Mediterranean diet. They're eaten instead of less healthy fats, such as saturated and trans fats, which contribute to heart disease.  Olive oil is the primary source of added fat in the Mediterranean diet. Olive oil provides monounsaturated fat, which has been found to lower total cholesterol and low-density lipoprotein (LDL or \"bad\") cholesterol levels. Nuts and seeds also contain monounsaturated fat.  Fish are also important in the Mediterranean diet. Fatty fish -- such as mackerel, herring, sardines, albacore tuna, salmon and lake trout -- are rich in omega-3 fatty acids, a type of polyunsaturated fat that may reduce inflammation in the body. Omega-3 fatty acids also help decrease triglycerides, reduce blood clotting, and decrease the risk of stroke and heart failure.  The Mediterranean diet typically allows red wine in moderation. Although alcohol has been " associated with a reduced risk of heart disease in some studies, it's by no means risk free. The Dietary Guidelines for Americans caution against beginning to drink or drinking more often on the basis of potential health benefits.  Interested in trying the Mediterranean diet? These tips will help you get started:    Eat more fruits and vegetables. Aim for 7 to 10 servings a day of fruit and vegetables.     Opt for whole grains. Switch to whole-grain bread, cereal and pasta. [place] with other whole grains, such as bulgur and farro.     Use healthy fats. Try olive oil as a replacement for butter when cooking. Instead of putting butter or margarine on bread, try dipping it in flavored olive oil.     Eat more seafood. Eat fish twice a week. Fresh or water-packed tuna, salmon, trout, mackerel and herring are healthy choices. Grilled fish tastes good and requires little cleanup. Avoid deep-fried fish.     Reduce red meat. Substitute fish, poultry or beans for meat. If you eat meat, make sure it's lean and keep portions small.     Enjoy some dairy. Eat low-fat Greek or plain yogurt and small amounts of a variety of cheeses.     Spice it up. Herbs and spices boost flavor and lessen the need for salt.  The Mediterranean diet is a delicious and healthy way to eat. Many people who switch to this style of eating say they'll never eat any other way.        Return in about 3 months (around 1/12/2022) for Diabetes/Prediabetes.    Den Beckwith MD  [facility] MINA Garcia is a 51 year old who presents for the following health issues     HPI     Chief Complaint   Patient presents with     Results     Abdominal distension  Fatty liver  Prediabetes  Abdominal fullness, feels uncomfortable after eating, breathing restricted after eating.  Soft stool 3x per day.  History of ovarian cyst        Review of Systems   Constitutional, HEENT, cardiovascular, pulmonary, gi and gu systems are negative,  except as otherwise noted.      Objective           Vitals:  No vitals were obtained today due to virtual visit.    Physical Exam   healthy, alert and no distress  PSYCH: Alert and oriented times 3; coherent speech, normal   rate and volume, able to articulate logical thoughts, able   to abstract reason, no tangential thoughts, no hallucinations   or delusions  Her affect is normal  RESP: No cough, no audible wheezing, able to talk in full sentences  Remainder of exam unable to be completed due to telephone visits                Phone call duration: 25 minutes

## 2021-10-18 ENCOUNTER — ANCILLARY PROCEDURE (OUTPATIENT)
Dept: CT IMAGING | Facility: CLINIC | Age: 51
End: 2021-10-18
Attending: FAMILY MEDICINE
Payer: COMMERCIAL

## 2021-10-18 DIAGNOSIS — R19.8 GENERALIZED ABDOMINAL FULLNESS: ICD-10-CM

## 2021-10-18 DIAGNOSIS — R14.0 ABDOMINAL DISTENSION: ICD-10-CM

## 2021-10-18 PROCEDURE — 74176 CT ABD & PELVIS W/O CONTRAST: CPT | Mod: TC | Performed by: RADIOLOGY

## 2021-10-22 ENCOUNTER — VIRTUAL VISIT (OUTPATIENT)
Dept: FAMILY MEDICINE | Facility: CLINIC | Age: 51
End: 2021-10-22
Payer: COMMERCIAL

## 2021-10-22 DIAGNOSIS — N83.202 CYST OF LEFT OVARY: ICD-10-CM

## 2021-10-22 DIAGNOSIS — R14.3 EXCESSIVE GAS: ICD-10-CM

## 2021-10-22 DIAGNOSIS — R19.8 PROTUBERANT ABDOMEN: Primary | ICD-10-CM

## 2021-10-22 PROCEDURE — 99214 OFFICE O/P EST MOD 30 MIN: CPT | Mod: 95 | Performed by: FAMILY MEDICINE

## 2021-10-22 RX ORDER — SIMETHICONE 80 MG
80-160 TABLET,CHEWABLE ORAL EVERY 6 HOURS PRN
Qty: 90 TABLET | Refills: 1 | Status: SHIPPED | OUTPATIENT
Start: 2021-10-22 | End: 2023-05-17

## 2021-10-22 NOTE — PROGRESS NOTES
"Radha is a 51 year old who is being evaluated via a billable telephone visit.      What phone number would you like to be contacted at? [phone]  How would you like to obtain your AVS? MyChart    Assessment & Plan       ICD-10-CM    1. Protuberant abdomen  R19.8 simethicone (MYLICON) 80 MG chewable tablet   2. Excessive gas  R14.3 simethicone (MYLICON) 80 MG chewable tablet   3. Cyst of left ovary  N83.202          Review of external notes as documented elsewhere in note  Review of the result(s) of each unique test - CT abdomen, ultrasound abdomen, blood tests, weight trend   Time spent doing chart review, history and exam, documentation and further activities per the note       BMI:   Estimated body mass index is 36.78 kg/m  as calculated from the following:    Height as of 2/11/21: 1.647 m (5' 4.85\").    Weight as of 9/21/21: 99.8 kg (220 lb).   Weight management plan: Discussed healthy diet and exercise guidelines    Patient Instructions   Radha    It was a pleasure seeing you in clinic today.  Here's the plan:    - symptom and diet journaling  - recommend low gas diet  - recommend lifestyle changes to limit aerophagia (no carbonated beverages, no straws, no gum, no hard candy)  - recommend increased activity/exercise - walking 10 minutes a day three times a day to start  - use simethicone  mg every 6 hrs as needed for bloating (do not exceed 500 mg/day)  - follow-up in 3 months' time if still symptomatic       Let me know if you have questions.    Den Beckwith MD      Patient Education     Excess Gas   Certain foods produce gas when digested. In some people, these foods make an excessive amount of gas. This may cause bloating, burping, or increased gas passing through the rectum (flatulence).  Foods that cause gas  These foods are more likely to cause this problem. Limit them, or remove them from your diet:    Broccoli    Cauliflower    [place] sprouts    Cabbage    Cooked dried beans    Fizzy " (carbonated) drinks, such as sparkling water, soda, beer, and champagne  Other causes  Other causes of excess gas include:    Eating too fast ortalkingwhile you chew. This may cause you to swallow air. This increases the amount of gas in your stomach. And it may make your symptoms worse. Chew each mouthful completely before you swallow. Take your time.    Chewing on gum or sucking on hard candy. These cause you to swallow more often. And some of what you are swallowing is air. This leads to more gas in your stomach. Avoid chewing gum and hard candy.    Overeating. This may increase the feeling of being bloated and cause more gas. When you are full, stop eating.     Being constipated. This can increase the amount of normal intestinal gas. Prevent constipation by getting more fiber in your diet. Good sources of fiber include whole-grain cereal, fresh vegetables (except those in the above list), and fresh fruits. High-fiber foods absorb water and carry it out of the body. When adding more fiber to your diet, you also need to drink more water. Drink at least 8, 8-ounce glasses of water (2 quarts) per day.  [employer] last reviewed this educational content on 10/1/2019    1612-2577 [employer]. All rights reserved. This information is not intended as a substitute for professional medical care. Always follow your healthcare professional's instructions.               Return in about 6 months (around 4/22/2022) for For Re-Assessment.    Den Beckwith MD  [facility] MINA Garcia is a 51 year old who presents for the following health issues     HPI     Chief Complaint   Patient presents with     Results     CT     Patient presents to discuss CT scan results for continued assessment of abdominal fullness/distension.  Fatty liver/hepatomegaly  Otherwise normal  Per chart review, patient was assessed for this same issue by GI specialist  Determine to be focal weight gain and gas.   Recommended simethicone, low gas-inducing diet and follow-up 3 months later.  Patient had been worried about ovarian cyst vs cancer, reassured no indication for this    Review of Systems   Constitutional, HEENT, cardiovascular, pulmonary, gi and gu systems are negative, except as otherwise noted.      Objective           Vitals:  No vitals were obtained today due to virtual visit.    Physical Exam   healthy, alert and no distress  PSYCH: Alert and oriented times 3; coherent speech, normal   rate and volume, able to articulate logical thoughts, able   to abstract reason, no tangential thoughts, no hallucinations   or delusions  Her affect is normal  RESP: No cough, no audible wheezing, able to talk in full sentences  Remainder of exam unable to be completed due to telephone visits                Phone call duration: 40 minutes

## 2021-10-22 NOTE — PATIENT INSTRUCTIONS
Rahda    It was a pleasure seeing you in clinic today.  Here's the plan:    - symptom and diet journaling  - recommend low gas diet  - recommend lifestyle changes to limit aerophagia (no carbonated beverages, no straws, no gum, no hard candy)  - recommend increased activity/exercise - walking 10 minutes a day three times a day to start  - use simethicone  mg every 6 hrs as needed for bloating (do not exceed 500 mg/day)  - follow-up in 3 months' time if still symptomatic       Let me know if you have questions.    Den Beckwith MD      Patient Education     Excess Gas   Certain foods produce gas when digested. In some people, these foods make an excessive amount of gas. This may cause bloating, burping, or increased gas passing through the rectum (flatulence).  Foods that cause gas  These foods are more likely to cause this problem. Limit them, or remove them from your diet:    Broccoli    Cauliflower    Sutton sprouts    Cabbage    Cooked dried beans    Fizzy (carbonated) drinks, such as sparkling water, soda, beer, and champagne  Other causes  Other causes of excess gas include:    Eating too fast ortalkingwhile you chew. This may cause you to swallow air. This increases the amount of gas in your stomach. And it may make your symptoms worse. Chew each mouthful completely before you swallow. Take your time.    Chewing on gum or sucking on hard candy. These cause you to swallow more often. And some of what you are swallowing is air. This leads to more gas in your stomach. Avoid chewing gum and hard candy.    Overeating. This may increase the feeling of being bloated and cause more gas. When you are full, stop eating.     Being constipated. This can increase the amount of normal intestinal gas. Prevent constipation by getting more fiber in your diet. Good sources of fiber include whole-grain cereal, fresh vegetables (except those in the above list), and fresh fruits. High-fiber foods absorb water and  carry it out of the body. When adding more fiber to your diet, you also need to drink more water. Drink at least 8, 8-ounce glasses of water (2 quarts) per day.  [employer] last reviewed this educational content on 10/1/2019    6580-3790 [employer]. All rights reserved. This information is not intended as a substitute for professional medical care. Always follow your healthcare professional's instructions.

## 2021-11-12 ENCOUNTER — OFFICE VISIT (OUTPATIENT)
Dept: URGENT CARE | Facility: URGENT CARE | Age: 51
End: 2021-11-12
Payer: COMMERCIAL

## 2021-11-12 ENCOUNTER — ANCILLARY PROCEDURE (OUTPATIENT)
Dept: GENERAL RADIOLOGY | Facility: CLINIC | Age: 51
End: 2021-11-12
Attending: PHYSICIAN ASSISTANT
Payer: COMMERCIAL

## 2021-11-12 VITALS
TEMPERATURE: 97.7 F | DIASTOLIC BLOOD PRESSURE: 92 MMHG | SYSTOLIC BLOOD PRESSURE: 139 MMHG | OXYGEN SATURATION: 96 % | HEART RATE: 112 BPM

## 2021-11-12 DIAGNOSIS — R42 DIZZINESS: ICD-10-CM

## 2021-11-12 DIAGNOSIS — M54.50 ACUTE RIGHT-SIDED LOW BACK PAIN WITHOUT SCIATICA: ICD-10-CM

## 2021-11-12 DIAGNOSIS — H83.09 LABYRINTHITIS, UNSPECIFIED LATERALITY: ICD-10-CM

## 2021-11-12 DIAGNOSIS — Z20.822 EXPOSURE TO 2019 NOVEL CORONAVIRUS: ICD-10-CM

## 2021-11-12 DIAGNOSIS — H66.003 ACUTE SUPPURATIVE OTITIS MEDIA OF BOTH EARS WITHOUT SPONTANEOUS RUPTURE OF TYMPANIC MEMBRANES, RECURRENCE NOT SPECIFIED: Primary | ICD-10-CM

## 2021-11-12 LAB
ALBUMIN UR-MCNC: NEGATIVE MG/DL
APPEARANCE UR: CLEAR
BASOPHILS # BLD AUTO: 0 10E3/UL (ref 0–0.2)
BASOPHILS NFR BLD AUTO: 0 %
BILIRUB UR QL STRIP: NEGATIVE
COLOR UR AUTO: YELLOW
EOSINOPHIL # BLD AUTO: 0.1 10E3/UL (ref 0–0.7)
EOSINOPHIL NFR BLD AUTO: 1 %
ERYTHROCYTE [DISTWIDTH] IN BLOOD BY AUTOMATED COUNT: 13.6 % (ref 10–15)
GLUCOSE UR STRIP-MCNC: NEGATIVE MG/DL
HCT VFR BLD AUTO: 43.4 % (ref 35–47)
HGB BLD-MCNC: 13.7 G/DL (ref 11.7–15.7)
HGB UR QL STRIP: NEGATIVE
IMM GRANULOCYTES # BLD: 0 10E3/UL
IMM GRANULOCYTES NFR BLD: 0 %
KETONES UR STRIP-MCNC: NEGATIVE MG/DL
LEUKOCYTE ESTERASE UR QL STRIP: NEGATIVE
LYMPHOCYTES # BLD AUTO: 2.4 10E3/UL (ref 0.8–5.3)
LYMPHOCYTES NFR BLD AUTO: 30 %
MCH RBC QN AUTO: 26.6 PG (ref 26.5–33)
MCHC RBC AUTO-ENTMCNC: 31.6 G/DL (ref 31.5–36.5)
MCV RBC AUTO: 84 FL (ref 78–100)
MONOCYTES # BLD AUTO: 0.8 10E3/UL (ref 0–1.3)
MONOCYTES NFR BLD AUTO: 10 %
NEUTROPHILS # BLD AUTO: 4.7 10E3/UL (ref 1.6–8.3)
NEUTROPHILS NFR BLD AUTO: 58 %
NITRATE UR QL: NEGATIVE
PH UR STRIP: 5.5 [PH] (ref 5–7)
PLATELET # BLD AUTO: 509 10E3/UL (ref 150–450)
RBC # BLD AUTO: 5.15 10E6/UL (ref 3.8–5.2)
SP GR UR STRIP: >=1.03 (ref 1–1.03)
UROBILINOGEN UR STRIP-ACNC: 0.2 E.U./DL
WBC # BLD AUTO: 8 10E3/UL (ref 4–11)

## 2021-11-12 PROCEDURE — 36415 COLL VENOUS BLD VENIPUNCTURE: CPT | Performed by: PHYSICIAN ASSISTANT

## 2021-11-12 PROCEDURE — 93000 ELECTROCARDIOGRAM COMPLETE: CPT | Performed by: PHYSICIAN ASSISTANT

## 2021-11-12 PROCEDURE — 71046 X-RAY EXAM CHEST 2 VIEWS: CPT | Performed by: RADIOLOGY

## 2021-11-12 PROCEDURE — 81003 URINALYSIS AUTO W/O SCOPE: CPT | Performed by: PHYSICIAN ASSISTANT

## 2021-11-12 PROCEDURE — U0005 INFEC AGEN DETEC AMPLI PROBE: HCPCS | Performed by: PHYSICIAN ASSISTANT

## 2021-11-12 PROCEDURE — U0003 INFECTIOUS AGENT DETECTION BY NUCLEIC ACID (DNA OR RNA); SEVERE ACUTE RESPIRATORY SYNDROME CORONAVIRUS 2 (SARS-COV-2) (CORONAVIRUS DISEASE [COVID-19]), AMPLIFIED PROBE TECHNIQUE, MAKING USE OF HIGH THROUGHPUT TECHNOLOGIES AS DESCRIBED BY CMS-2020-01-R: HCPCS | Performed by: PHYSICIAN ASSISTANT

## 2021-11-12 PROCEDURE — 85025 COMPLETE CBC W/AUTO DIFF WBC: CPT | Performed by: PHYSICIAN ASSISTANT

## 2021-11-12 PROCEDURE — 99214 OFFICE O/P EST MOD 30 MIN: CPT | Performed by: PHYSICIAN ASSISTANT

## 2021-11-12 RX ORDER — PREDNISONE 10 MG/1
TABLET ORAL
Qty: 45 TABLET | Refills: 0 | Status: SHIPPED | OUTPATIENT
Start: 2021-11-12 | End: 2023-05-17

## 2021-11-12 RX ORDER — AZITHROMYCIN 250 MG/1
TABLET, FILM COATED ORAL
Qty: 6 TABLET | Refills: 0 | Status: SHIPPED | OUTPATIENT
Start: 2021-11-12 | End: 2021-11-17

## 2021-11-12 ASSESSMENT — ENCOUNTER SYMPTOMS
SHORTNESS OF BREATH: 0
DIARRHEA: 0
CHILLS: 0
VOMITING: 0
BACK PAIN: 1
ARTHRALGIAS: 0
EYES NEGATIVE: 1
MYALGIAS: 0
WEAKNESS: 0
NECK PAIN: 0
LIGHT-HEADEDNESS: 0
SORE THROAT: 0
FEVER: 0
PALPITATIONS: 0
ALLERGIC/IMMUNOLOGIC NEGATIVE: 1
HEADACHES: 1
RHINORRHEA: 0
NECK STIFFNESS: 0
WOUND: 0
COUGH: 1
CARDIOVASCULAR NEGATIVE: 1
DIZZINESS: 1
NAUSEA: 1
ENDOCRINE NEGATIVE: 1
JOINT SWELLING: 0

## 2021-11-12 NOTE — PATIENT INSTRUCTIONS
Patient Education     Labyrinthitis    Labyrinthitis is the inflammation of part of the inner ear called the labyrinth. It usually affects only one ear. A nerve in the head called the 8th cranial nerve may also be inflamed. Labyrinthitis causes a sense of spinning and hearing loss. In most people these go away over time.   Understanding the inner ear  The inner ear has a system of fluid-filled tubes and sacs. This system is called the labyrinth. Inside the inner ear, the cochlea senses sound. The vestibular organs sense motion and changes in space. These create your sense of balance. The 8th cranial nerve (vestibulocochlear nerve) sends all of this information from the inner ear to the brain.   When one of the nerves or the labyrinth is infected, it can become inflamed. This can cause it not to work normally. It may cause hearing loss in one ear. The brain now has to make sense of the information that doesn't match between the normal nerve and the infected one. This causes a feeling that the world is spinning around you (vertigo).   What causes labyrinthitis?  A viral infection may cause the condition. The virus may have spread throughout your body. Or it may only affect the labyrinth and 8th cranial nerve. Usually only one nerve is affected. Viruses that can cause labyrinthitis include:     Herpes viruses    Influenza    Measles    Mumps    Rubella    Polio    Hepatitis    Edd-Weldon    Varicella  Chronic bacterial infections of the middle ear can spread to the inner ear and cause labyrinthitis. In rare cases, bacterial meningitis or head trauma may cause labyrinthitis. In other cases, the cause of labyrinthitis is not known.   Symptoms of labyrinthitis  Symptoms of labyrinthitis of may include:     A feeling of spinning (vertigo)    Dizziness    Lack of balance when walking    Nausea and vomiting    Trouble concentrating    Back-and-forth eye movements that you can't control (nystagmus)    Hearing  loss    Ringing in the ears  Symptoms may range from mild to severe. Severe symptoms such as vertigo can cause problems with standing and walking. Symptoms may come on very quickly and start during routine daily activities. Or you may start to have symptoms when you wake up in the morning. In many people, these symptoms go away over several weeks. Or they can last longer.   Diagnosing labyrinthitis  Your healthcare provider will ask about your past health. You may also have a physical exam. This may include hearing and balance tests. It will also include an exam of your nervous system. There are no tests for labyrinthitis. But your healthcare provider may have you take an imaging test. Many health conditions can cause dizziness and vertigo. Your healthcare provider will need to make sure you don't have another condition that causes these symptoms, such as stroke.   You may have tests such as:    MRI, to check for a stroke    Electrocardiogram (ECG), to check for cardiovascular causes    Electronystagmography (ENG) or videonystagmography (VNG). Either of these records your eye movement to find where the problem is in your vestibular system. These tests can find the cause of a balance disorder.  Treatment for labyrinthitis  Treatment depends on your overall health and symptoms. Treatment for labyrinthitis may include:     Corticosteroids to help reduce inflammation of the nerve    Antiviral medicines    Antibiotics if you have signs of a bacterial infection    Medicines to take for a short time that control nausea and dizziness, such as diphenhydramine or lorazepam  If your symptoms go away in a few weeks, you likely won't need other treatment. If you have symptoms that don't go away, you may need to do certain exercises. These are known as vestibular rehabilitation exercises. They are a form of physical therapy. These exercises may help your brain learn to adjust to the vestibular imbalance. A physical therapist will  teach you the exercises. Then you can do them at home.   Possible complications of labyrinthitis  In most cases, labyrinthitis does not cause any complications. In rare cases, it may permanently damage the 8th cranial nerve. This can cause lasting problems with balance. It can also cause partial or total loss of hearing. You may need to use a hearing aid. Getting treated right away can help reduce your risk for these problems.   When to call your healthcare provider  Call your healthcare provider right away if you have any of these:    Symptoms that get worse, or don't get better with treatment    New symptoms   [employer] last reviewed this educational content on 8/1/2020 2000-2021 [employer]. All rights reserved. This information is not intended as a substitute for professional medical care. Always follow your healthcare professional's instructions.           Patient Education     Middle Ear Infection (Otitis Media) in Adults  What is a middle ear infection?  A middle ear infection occurs behind the eardrum. It is most often caused by a virus or bacteria. Most kids have at least one middle ear infection by the time they are 3 years old. But adults can also get them.   What causes middle ear infections?  Inflammation in the middle ear most often starts after you ve had a sore throat, cold, or other upper respiratory problem. The infection spreads to the middle ear and causes fluid buildup behind the eardrum.    What are the symptoms of a middle ear infection?  These are the most common symptoms of middle ear infections in adults:     Ear pain    Feeling of fullness in the ear    Fluid draining from the ear    Fever    Hearing loss  These symptoms may look like other conditions or health problems. Always talk with your healthcare provider for a diagnosis.   How is a middle ear infection diagnosed?  Your healthcare provider will review your health history and do a physical exam. They will check the  outer ear and the eardrum using an otoscope. This is a lighted tool that lets the healthcare provider see inside the ear. A pneumatic otoscope blows a puff of air into the ear to test eardrum movement. When there is fluid or infection in the middle ear, movement is decreased.   Your provider may also do a tympanometry. This is a test that directs air and sound to the middle ear.   If you have ear infections often, your healthcare provider may suggest having a hearing test.   How is a middle ear infection treated?  Treatment will depend on your symptoms, age, and general health. It will also depend on how severe the condition is.   Treatment may include:    Antibiotics    Pain relievers    Placing small tubes in the eardrum for chronic ear infections   What are possible complications of a middle ear infection?   Untreated ear infections can lead to:    Infection in other parts of the head    Lasting (permanent) hearing loss    Speech and language problems  Can middle ear infections be prevented?  Cold and allergy medicines don't seem to prevent ear infections. And currently there is no vaccine that can prevent the disease. But check with your healthcare provider and make sure your vaccines are up-to-date. Living in a home where cigarettes are smoked can increase the chances of ear infections. So can living in a home where vaping devices, such as e-cigarettes and electronic nicotine, are used   Key points about middle ear infections    Middle ear infections can affect both children and adults.    Pain and fever can be the most common symptoms.    Without treatment, permanent hearing loss may happen.    Take antibiotics as prescribed and finish all of the prescription. This can help prevent antibiotic-resistant infections or incomplete treatment with the infection returning.    Keeping your home smoke-free or free of vaping devices can decrease the chances of ear infections.    Next steps  Tips to help you get the most  from a visit to your healthcare provider:    Know the reason for your visit and what you want to happen.    Before your visit, write down questions you want answered.    Bring someone with you to help you ask questions and remember what your provider tells you.    At the visit, write down the name of a new diagnosis, and any new medicines, treatments, or tests. Also write down any new instructions your provider gives you.    Know why a new medicine or treatment is prescribed, and how it will help you. Also know what the side effects are.    Ask if your condition can be treated in other ways.    Know why a test or procedure is recommended and what the results could mean.    Know what to expect if you do not take the medicine or have the test or procedure.    If you have a follow-up appointment, write down the date, time, and purpose for that visit.    Know how you can contact your provider if you have questions.  [employer] last reviewed this educational content on 1/1/2021 2000-2021 [employer]. All rights reserved. This information is not intended as a substitute for professional medical care. Always follow your healthcare professional's instructions.

## 2021-11-12 NOTE — PROGRESS NOTES
Chief Complaint:     Chief Complaint   Patient presents with     URI     Pt states that she has pneumonia, severe right sided low back pain, sob, dizziness, headache, nausea. Pt has been taking tylenol and ibuprofen.  Onset- Three weeks ago        Results for orders placed or performed in visit on 11/12/21   XR Chest 2 Views     Status: None    Narrative    XR CHEST 2 VW 11/12/2021 2:48 PM    HISTORY: dizziness, possible COVID 2 weeks ago.; Acute right-sided low  back pain without sciatica    COMPARISON: CT abdomen 10/18/2021      Impression    IMPRESSION: Suspected punctate left mid lung calcified granuloma.  Minimal bilateral mid to lower lung peripheral opacities may reflect  minimal infiltrates or atelectasis. No pleural effusion. Normal heart  size.    APRIL BRENNER MD         SYSTEM ID:  [id number]   Results for orders placed or performed in visit on 11/12/21   UA Macro with Reflex to Micro and Culture - lab collect     Status: Normal    Specimen: Urine, Midstream   Result Value Ref Range    Color Urine Yellow Colorless, Straw, Light Yellow, Yellow    Appearance Urine Clear Clear    Glucose Urine Negative Negative mg/dL    Bilirubin Urine Negative Negative    Ketones Urine Negative Negative mg/dL    Specific Gravity Urine >=1.030 1.003 - 1.035    Blood Urine Negative Negative    pH Urine 5.5 5.0 - 7.0    Protein Albumin Urine Negative Negative mg/dL    Urobilinogen Urine 0.2 0.2, 1.0 E.U./dL    Nitrite Urine Negative Negative    Leukocyte Esterase Urine Negative Negative    Narrative    Microscopic not indicated   CBC with platelets and differential     Status: Abnormal   Result Value Ref Range    WBC Count 8.0 4.0 - 11.0 10e3/uL    RBC Count 5.15 3.80 - 5.20 10e6/uL    Hemoglobin 13.7 11.7 - 15.7 g/dL    Hematocrit 43.4 35.0 - 47.0 %    MCV 84 78 - 100 fL    MCH 26.6 26.5 - 33.0 pg    MCHC 31.6 31.5 - 36.5 g/dL    RDW 13.6 10.0 - 15.0 %    Platelet Count 509 (H) 150 - 450 10e3/uL    % Neutrophils 58 %    %  Lymphocytes 30 %    % Monocytes 10 %    % Eosinophils 1 %    % Basophils 0 %    % Immature Granulocytes 0 %    Absolute Neutrophils 4.7 1.6 - 8.3 10e3/uL    Absolute Lymphocytes 2.4 0.8 - 5.3 10e3/uL    Absolute Monocytes 0.8 0.0 - 1.3 10e3/uL    Absolute Eosinophils 0.1 0.0 - 0.7 10e3/uL    Absolute Basophils 0.0 0.0 - 0.2 10e3/uL    Absolute Immature Granulocytes 0.0 <=0.0 10e3/uL   CBC with platelets and differential     Status: Abnormal    Narrative    The following orders were created for panel order CBC with platelets and differential.  Procedure                               Abnormality         Status                     ---------                               -----------         ------                     CBC with platelets and d...[[id number]]  Abnormal            Final result                 Please view results for these tests on the individual orders.       Medical Decision Making:    Vital signs reviewed by Jitendra Lopez PA-C  BP (!) 139/92 (BP Location: Right arm, Patient Position: Sitting, Cuff Size: Adult Large)   Pulse 112   Temp 97.7  F (36.5  C) (Tympanic)   SpO2 96%     Differential Diagnosis:  Pneumonia, MI, labyrinthitis, otitis media, viral upper resp.        ASSESSMENT    1. Acute suppurative otitis media of both ears without spontaneous rupture of tympanic membranes, recurrence not specified    2. Labyrinthitis, unspecified laterality    3. Dizziness    4. Exposure to 2019 novel coronavirus    5. Acute right-sided low back pain without sciatica        PLAN    Patient is in no acute distress.  She is afebrile with stable vital signs.  Patient states that low back pain has resolved.    Temp is 97.7 in clinic today, lung sounds were clear, and O2 sats at 96% on RA.    CXR was negative for any acute infiltrates or consolidations per my read.  EKG was negative for any acute ST or ischemic changes per my read.  Low suspicion for acute coronary syndrome.  UA was unremarkable for UTI.  CBC was  unremarkable.  COVID swab collected in clinic today.  Rx for Zithromax for otitis media.  Rx for Prednisone for possible labyrinthitis.  Rest, Push fluids, vaporizer, elevation of head of bed.  Ibuprofen and or Tylenol for any fever or body aches.  Over the counter cough suppressant- PRN- as discussed.   If symptoms worsen, recheck immediately otherwise follow up with your PCP in 1 week if symptoms are not improving.  Worrisome symptoms discussed with instructions to go to the ED.  Patient given COVID isolation instructions.  Patient verbalized understanding and agreed with this plan.    Labs:         EKG Interpretation:      Interpreted by Jitendra Lopez PA-C  Time reviewed:15:25   Symptoms at time of EKG: dizziness, weakness   Rhythm: Normal sinus   Rate: Tachycardia and 101  Axis: Normal  Ectopy: None  Conduction: Normal  ST Segments/ T Waves: No ST-T wave changes and No acute ischemic changes  Q Waves: None  Comparison to prior: Sinus tachycardia new from 1/17/2020    Clinical Impression: non-specific EKG      Results for orders placed or performed in visit on 11/12/21   XR Chest 2 Views     Status: None    Narrative    XR CHEST 2 VW 11/12/2021 2:48 PM    HISTORY: dizziness, possible COVID 2 weeks ago.; Acute right-sided low  back pain without sciatica    COMPARISON: CT abdomen 10/18/2021      Impression    IMPRESSION: Suspected punctate left mid lung calcified granuloma.  Minimal bilateral mid to lower lung peripheral opacities may reflect  minimal infiltrates or atelectasis. No pleural effusion. Normal heart  size.    APRIL BRENNER MD         SYSTEM ID:  [id number]   Results for orders placed or performed in visit on 11/12/21   UA Macro with Reflex to Micro and Culture - lab collect     Status: Normal    Specimen: Urine, Midstream   Result Value Ref Range    Color Urine Yellow Colorless, Straw, Light Yellow, Yellow    Appearance Urine Clear Clear    Glucose Urine Negative Negative mg/dL    Bilirubin Urine  Negative Negative    Ketones Urine Negative Negative mg/dL    Specific Gravity Urine >=1.030 1.003 - 1.035    Blood Urine Negative Negative    pH Urine 5.5 5.0 - 7.0    Protein Albumin Urine Negative Negative mg/dL    Urobilinogen Urine 0.2 0.2, 1.0 E.U./dL    Nitrite Urine Negative Negative    Leukocyte Esterase Urine Negative Negative    Narrative    Microscopic not indicated   CBC with platelets and differential     Status: Abnormal   Result Value Ref Range    WBC Count 8.0 4.0 - 11.0 10e3/uL    RBC Count 5.15 3.80 - 5.20 10e6/uL    Hemoglobin 13.7 11.7 - 15.7 g/dL    Hematocrit 43.4 35.0 - 47.0 %    MCV 84 78 - 100 fL    MCH 26.6 26.5 - 33.0 pg    MCHC 31.6 31.5 - 36.5 g/dL    RDW 13.6 10.0 - 15.0 %    Platelet Count 509 (H) 150 - 450 10e3/uL    % Neutrophils 58 %    % Lymphocytes 30 %    % Monocytes 10 %    % Eosinophils 1 %    % Basophils 0 %    % Immature Granulocytes 0 %    Absolute Neutrophils 4.7 1.6 - 8.3 10e3/uL    Absolute Lymphocytes 2.4 0.8 - 5.3 10e3/uL    Absolute Monocytes 0.8 0.0 - 1.3 10e3/uL    Absolute Eosinophils 0.1 0.0 - 0.7 10e3/uL    Absolute Basophils 0.0 0.0 - 0.2 10e3/uL    Absolute Immature Granulocytes 0.0 <=0.0 10e3/uL   CBC with platelets and differential     Status: Abnormal    Narrative    The following orders were created for panel order CBC with platelets and differential.  Procedure                               Abnormality         Status                     ---------                               -----------         ------                     CBC with platelets and d...[[id number]]  Abnormal            Final result                 Please view results for these tests on the individual orders.        Vital signs reviewed by Jitendra Lopez PA-C  BP (!) 139/92 (BP Location: Right arm, Patient Position: Sitting, Cuff Size: Adult Large)   Pulse 112   Temp 97.7  F (36.5  C) (Tympanic)   SpO2 96%     Current Meds      Current Outpatient Medications:      azithromycin (ZITHROMAX)  250 MG tablet, Take 2 tablets (500 mg) by mouth daily for 1 day, THEN 1 tablet (250 mg) daily for 4 days., Disp: 6 tablet, Rfl: 0     predniSONE (DELTASONE) 10 MG tablet, Take 5 tablets for 3 days, then 4 for 3 days, 3 for 3 days, 2 for 3 days, 1 for 3 days., Disp: 45 tablet, Rfl: 0     simethicone (MYLICON) 80 MG chewable tablet, Take 1-2 tablets ( mg) by mouth every 6 hours as needed for flatulence or cramping (Patient not taking: Reported on 11/12/2021), Disp: 90 tablet, Rfl: 1      Respiratory History    occasional episodes of bronchitis      SUBJECTIVE    HPI: Radha Crockett is an 51 year old female who presents with dizziness, headache, nausea, shortness of breath, dry cough, and severe R sided low back pain.  Symptoms began 3  weeks ago and has gradually improving. Patient was exposed to COVID, and did not get tested.  She quarantined at home.  Patient did not come in for evaluation due to SOB and fatigue with activity.   There is no wheezing, chest pain or palpitations.  Patient is eating and drinking well.  No fever, nausea, vomiting, or diarrhea.    Patient denies any recent travel or exposure to known COVID positive tested individual.      ROS:     Review of Systems   Constitutional: Negative for chills and fever.   HENT: Positive for congestion. Negative for ear pain, rhinorrhea and sore throat.    Eyes: Negative.    Respiratory: Positive for cough. Negative for shortness of breath.    Cardiovascular: Negative.  Negative for chest pain and palpitations.   Gastrointestinal: Positive for nausea. Negative for diarrhea and vomiting.   Endocrine: Negative.    Genitourinary: Negative.    Musculoskeletal: Positive for back pain. Negative for arthralgias, joint swelling, myalgias, neck pain and neck stiffness.   Skin: Negative.  Negative for rash and wound.   Allergic/Immunologic: Negative.  Negative for immunocompromised state.   Neurological: Positive for dizziness and headaches. Negative for weakness and  light-headedness.         Family History   Family History   Problem Relation Age of Onset     Myocardial Infarction Father         at age 63 years      Diabetes Father      Gastrointestinal Disease Mother         GI bleeding, source unknown     Vascular Disease Mother      Leukemia Paternal Aunt         Problem history  Patient Active Problem List   Diagnosis     Recurrent miscarriages     CARDIOVASCULAR SCREENING; LDL GOAL LESS THAN 160     Ovarian cyst     Dependent edema     Bloating     Vertigo     Bilateral chronic vestibular neuritis        Allergies  Allergies   Allergen Reactions     Clomid [Clomiphene Citrate] Other (See Comments)     Presumed Ovarian hyperstimulation        Social History  Social History     Socioeconomic History     Marital status:      Spouse name: Not on file     Number of children: Not on file     Years of education: Not on file     Highest education level: Not on file   Occupational History     Not on file   Tobacco Use     Smoking status: Former Smoker     Packs/day: 0.50     Years: 20.00     Pack years: 10.00     Types: Cigarettes     Quit date: 2010     Years since quittin.3     Smokeless tobacco: Former User     Quit date: 12/3/2010   Substance and Sexual Activity     Alcohol use: Yes     Alcohol/week: 0.0 standard drinks     Comment: seldom use     Drug use: No     Sexual activity: Yes     Partners: Male   Other Topics Concern     Parent/sibling w/ CABG, MI or angioplasty before 65F 55M? Not Asked   Social History Narrative    Lives at home with her .       Social Determinants of Health     Financial Resource Strain: Not on file   Food Insecurity: Not on file   Transportation Needs: Not on file   Physical Activity: Not on file   Stress: Not on file   Social Connections: Not on file   Intimate Partner Violence: Not on file   Housing Stability: Not on file        OBJECTIVE     Vital signs reviewed by Jitendra Lopez PA-C  BP (!) 139/92 (BP Location: Right  arm, Patient Position: Sitting, Cuff Size: Adult Large)   Pulse 112   Temp 97.7  F (36.5  C) (Tympanic)   SpO2 96%      Physical Exam  Vitals and nursing note reviewed.   Constitutional:       General: She is not in acute distress.     Appearance: She is well-developed. She is not ill-appearing, toxic-appearing or diaphoretic.   HENT:      Head: Normocephalic and atraumatic.      Right Ear: Hearing, ear canal and external ear normal. No drainage, swelling or tenderness. Tympanic membrane is erythematous. Tympanic membrane is not perforated, retracted or bulging.      Left Ear: Hearing, ear canal and external ear normal. No drainage, swelling or tenderness. Tympanic membrane is erythematous. Tympanic membrane is not perforated, retracted or bulging.      Nose: Congestion present. No mucosal edema or rhinorrhea.      Right Sinus: No maxillary sinus tenderness or frontal sinus tenderness.      Left Sinus: No maxillary sinus tenderness or frontal sinus tenderness.      Mouth/Throat:      Pharynx: Posterior oropharyngeal erythema present. No pharyngeal swelling, oropharyngeal exudate or uvula swelling.      Tonsils: No tonsillar exudate or tonsillar abscesses. 0 on the right. 0 on the left.   Eyes:      General:         Right eye: No discharge.         Left eye: No discharge.      Pupils: Pupils are equal, round, and reactive to light.   Cardiovascular:      Rate and Rhythm: Normal rate and regular rhythm.      Heart sounds: Normal heart sounds. No murmur heard.  No friction rub. No gallop.    Pulmonary:      Effort: Pulmonary effort is normal. No respiratory distress.      Breath sounds: Normal breath sounds. No decreased breath sounds, wheezing, rhonchi or rales.   Chest:      Chest wall: No tenderness.   Abdominal:      General: Bowel sounds are normal. There is no distension.      Palpations: Abdomen is soft. There is no mass.      Tenderness: There is no abdominal tenderness. There is no guarding.    Musculoskeletal:      Cervical back: Normal range of motion and neck supple.   Lymphadenopathy:      Head:      Right side of head: No submental, submandibular, tonsillar, preauricular or posterior auricular adenopathy.      Left side of head: No submental, submandibular, tonsillar, preauricular or posterior auricular adenopathy.      Cervical: No cervical adenopathy.      Right cervical: No superficial or posterior cervical adenopathy.     Left cervical: No superficial or posterior cervical adenopathy.   Skin:     General: Skin is warm and dry.      Findings: No rash.   Neurological:      Mental Status: She is alert and oriented to person, place, and time.      GCS: GCS eye subscore is 4. GCS verbal subscore is 5. GCS motor subscore is 6.      Cranial Nerves: No cranial nerve deficit or facial asymmetry.      Sensory: Sensation is intact. No sensory deficit.      Motor: Motor function is intact. No weakness, atrophy or abnormal muscle tone.      Coordination: Coordination is intact. Coordination normal.      Gait: Gait is intact. Gait normal.      Deep Tendon Reflexes: Reflexes are normal and symmetric.   Psychiatric:         Behavior: Behavior normal. Behavior is cooperative.         Thought Content: Thought content normal.         Judgment: Judgment normal.           Jitendra Lopez PA-C  11/12/2021, 2:15 PM

## 2021-11-14 LAB — SARS-COV-2 RNA RESP QL NAA+PROBE: NEGATIVE

## 2021-12-03 ENCOUNTER — OFFICE VISIT (OUTPATIENT)
Dept: FAMILY MEDICINE | Facility: CLINIC | Age: 51
End: 2021-12-03
Payer: COMMERCIAL

## 2021-12-03 VITALS
DIASTOLIC BLOOD PRESSURE: 82 MMHG | HEART RATE: 83 BPM | OXYGEN SATURATION: 97 % | BODY MASS INDEX: 36.78 KG/M2 | SYSTOLIC BLOOD PRESSURE: 124 MMHG | WEIGHT: 220 LBS | TEMPERATURE: 98.3 F

## 2021-12-03 DIAGNOSIS — H61.892 IRRITATION OF EXTERNAL EAR CANAL, LEFT: Primary | ICD-10-CM

## 2021-12-03 DIAGNOSIS — K44.9 HIATAL HERNIA: ICD-10-CM

## 2021-12-03 PROCEDURE — 99214 OFFICE O/P EST MOD 30 MIN: CPT | Performed by: FAMILY MEDICINE

## 2021-12-03 RX ORDER — NEOMYCIN SULFATE, POLYMYXIN B SULFATE AND HYDROCORTISONE 10; 3.5; 1 MG/ML; MG/ML; [USP'U]/ML
3 SUSPENSION/ DROPS AURICULAR (OTIC) 3 TIMES DAILY
Qty: 10 ML | Refills: 0 | Status: SHIPPED | OUTPATIENT
Start: 2021-12-03 | End: 2021-12-10

## 2021-12-03 NOTE — PROGRESS NOTES
Assessment & Plan       ICD-10-CM    1. Irritation of external ear canal, left  H61.892 REVIEW OF HEALTH MAINTENANCE PROTOCOL ORDERS     neomycin-polymyxin-hydrocortisone (CORTISPORIN) 3.5-35743-0 otic suspension   2. Hiatal hernia  K44.9 omeprazole (PRILOSEC) 20 MG DR capsule         Review of external notes as documented elsewhere in note      Den Beckwith MD  [facility] MINA Garcia is a 51 year old who presents for the following health issues     HPI     ED/UC Followup:    Facility:  [facility]  Date of visit: 11/12/2021  Reason for visit: URI  Current Status: Needs ears rechecked did not take medication as prescribed and still feeling pain     Patient went to UC for ear pain and dizziness  Found to have Bilat OM - azithromycin  Prednisone for labyrinthitis  Has some ear pain currently        Review of Systems   Constitutional, HEENT, cardiovascular, pulmonary, gi and gu systems are negative, except as otherwise noted.      Objective    /82   Pulse 83   Temp 98.3  F (36.8  C) (Oral)   Wt 99.8 kg (220 lb)   SpO2 97%   BMI 36.78 kg/m    Body mass index is 36.78 kg/m .  Physical Exam   GENERAL: healthy, alert and no distress  EYES: Eyes grossly normal to inspection, PERRL and conjunctivae and sclerae normal  HENT: ear canals and TM's normal, nose and mouth without ulcers or lesions  NECK: no adenopathy, no asymmetry, masses, or scars and thyroid normal to palpation  SKIN: no suspicious lesions or rashes  PSYCH: mentation appears normal, affect normal/bright

## 2022-03-01 ENCOUNTER — OFFICE VISIT (OUTPATIENT)
Dept: FAMILY MEDICINE | Facility: CLINIC | Age: 52
End: 2022-03-01
Payer: COMMERCIAL

## 2022-03-01 VITALS — DIASTOLIC BLOOD PRESSURE: 96 MMHG | HEART RATE: 92 BPM | TEMPERATURE: 98.1 F | SYSTOLIC BLOOD PRESSURE: 164 MMHG

## 2022-03-01 DIAGNOSIS — R73.03 PREDIABETES: ICD-10-CM

## 2022-03-01 DIAGNOSIS — Z01.818 PREOP GENERAL PHYSICAL EXAM: Primary | ICD-10-CM

## 2022-03-01 LAB — HBA1C MFR BLD: 5.9 % (ref 0–5.6)

## 2022-03-01 PROCEDURE — 83036 HEMOGLOBIN GLYCOSYLATED A1C: CPT | Performed by: FAMILY MEDICINE

## 2022-03-01 PROCEDURE — 36415 COLL VENOUS BLD VENIPUNCTURE: CPT | Performed by: FAMILY MEDICINE

## 2022-03-01 PROCEDURE — 99214 OFFICE O/P EST MOD 30 MIN: CPT | Performed by: FAMILY MEDICINE

## 2022-03-01 NOTE — PATIENT INSTRUCTIONS
Preparing for Your Surgery  Getting started  A nurse will call you to review your health history and instructions. They will give you an arrival time based on your scheduled surgery time. Please be ready to share:    Your doctor's clinic name and phone number    Your medical, surgical and anesthesia history    A list of allergies and sensitivities    A list of medicines, including herbal treatments and over-the-counter drugs    Whether the patient has a legal guardian (ask how to send us the papers in advance)  Please tell us if you're pregnant--or if there's any chance you might be pregnant. Some surgeries may injure a fetus (unborn baby), so they require a pregnancy test. Surgeries that are safe for a fetus don't always need a test, and you can choose whether to have one.   If you have a child who's having surgery, please ask for a copy of Preparing for Your Child's Surgery.    Preparing for surgery    Within 30 days of surgery: Have a pre-op exam (sometimes called an H&P, or History and Physical). This can be done at a clinic or pre-operative center.  ? If you're having a , you may not need this exam. Talk to your care team.    At your pre-op exam, talk to your care team about all medicines you take. If you need to stop any medicines before surgery, ask when to start taking them again.  ? We do this for your safety. Many medicines can make you bleed too much during surgery. Some change how well surgery (anesthesia) drugs work.    Call your insurance company to let them know you're having surgery. (If you don't have insurance, call [phone].)    Call your clinic if there's any change in your health. This includes signs of a cold or flu (sore throat, runny nose, cough, rash, fever). It also includes a scrape or scratch near the surgery site.    If you have questions on the day of surgery, call your hospital or surgery center.  COVID testing  You may need to be tested for COVID-19 before having  surgery. If so, your surgical team will give you instructions for scheduling this test, separate from your preoperative history and physical.  Eating and drinking guidelines  For your safety: Unless your surgeon tells you otherwise, follow the guidelines below.    Eat and drink as usual until 8 hours before surgery. After that, no food or milk.    Drink clear liquids until 2 hours before surgery. These are liquids you can see through, like water, Gatorade and Propel Water. You may also have black coffee and tea (no cream or milk).    Nothing by mouth within 2 hours of surgery. This includes gum, candy and breath mints.    If you drink alcohol: Stop drinking it the night before surgery.    If your care team tells you to take medicine on the morning of surgery, it's okay to take it with a sip of water.  Preventing infection    Shower or bathe the night before and morning of your surgery. Follow the instructions your clinic gave you. (If no instructions, use regular soap.)    Don't shave or clip hair near your surgery site. We'll remove the hair if needed.    Don't smoke or vape the morning of surgery. You may chew nicotine gum up to 2 hours before surgery. A nicotine patch is okay.  ? Note: Some surgeries require you to completely quit smoking and nicotine. Check with your surgeon.    Your care team will make every effort to keep you safe from infection. We will:  ? Clean our hands often with soap and water (or an alcohol-based hand rub).  ? Clean the skin at your surgery site with a special soap that kills germs.  ? Give you a special gown to keep you warm. (Cold raises the risk of infection.)  ? Wear special hair covers, masks, gowns and gloves during surgery.  ? Give antibiotic medicine, if prescribed. Not all surgeries need antibiotics.  What to bring on the day of surgery    Photo ID and insurance card    Copy of your health care directive, if you have one    Glasses and hearing aides (bring cases)  ? You can't  wear contacts during surgery    Inhaler and eye drops, if you use them (tell us about these when you arrive)    CPAP machine or breathing device, if you use them    A few personal items, if spending the night    If you have . . .  ? A pacemaker, ICD (cardiac defibrillator) or other implant: Bring the ID card.  ? An implanted stimulator: Bring the remote control.  ? A legal guardian: Bring a copy of the certified (court-stamped) guardianship papers.  Please remove any jewelry, including body piercings. Leave jewelry and other valuables at home.  If you're going home the day of surgery    You must have a responsible adult drive you home. They should stay with you overnight as well.    If you don't have someone to stay with you, and you aren't safe to go home alone, we may keep you overnight. Insurance often won't pay for this.  After surgery  If it's hard to control your pain or you need more pain medicine, please call your surgeon's office.  Questions?   If you have any questions for your care team, list them here: _________________________________________________________________________________________________________________________________________________________________________ ____________________________________ ____________________________________ ____________________________________  For informational purposes only. Not to replace the advice of your health care provider. Copyright   2003, 2019 [facility]. All rights reserved. Clinically reviewed by Lisa Lebron MD. Artoo [id number] - REV 07/21.

## 2022-03-01 NOTE — PROGRESS NOTES
[facility]  [address] [place] [address]  [place] [address]  Phone: [phone]  Primary Provider: Azam Brito  Pre-op Performing Provider: GOMEZ LOWE      PREOPERATIVE EVALUATION:  Today's date: 3/1/2022    Radha Crockett is a 51 year old female who presents for a preoperative evaluation.    Surgical Information:  Surgery/Procedure: Silicone removal upper lip  Surgery Location: [facility] surgery [place]  Surgeon: Dr. Albrecht  Surgery Date: 03/21/2022  Time of Surgery: TBD  Where patient plans to recover: At home with family  Fax number for surgical facility: [phone]      BP Readings from Last 6 Encounters:   03/01/22 (!) 164/96   12/03/21 124/82   11/12/21 (!) 139/92   09/21/21 136/86   02/11/21 (!) 154/93   12/28/20 (!) 166/86     Wt Readings from Last 4 Encounters:   12/03/21 99.8 kg (220 lb)   09/21/21 99.8 kg (220 lb)   02/11/21 103.6 kg (228 lb 4.8 oz)   12/28/20 99.8 kg (220 lb)       Type of Anesthesia Anticipated: to be determined    Assessment & Plan     The proposed surgical procedure is considered LOW risk.      ICD-10-CM    1. Preop general physical exam  Z01.818    2. Prediabetes  R73.03 Hemoglobin A1c     Hemoglobin A1c       Risks and Recommendations:  The patient has the following additional risks and recommendations for perioperative complications:   - No identified additional risk factors other than previously addressed     Medication Instructions:  Patient is on no chronic medications    RECOMMENDATION:  APPROVAL GIVEN to proceed with proposed procedure, without further diagnostic evaluation.    Review of external notes as documented above       Subjective     HPI related to upcoming procedure: Patient presents for pre-op evaluation in anticipation for surgical removal of lip filler      Preop Questions 3/1/2022   1. Have you ever had a heart attack or stroke? No   2. Have you ever had surgery on your heart or blood vessels, such as  a stent placement, a coronary artery bypass, or surgery on an artery in your head, neck, heart, or legs? No   3. Do you have chest pain with activity? No   4. Do you have a history of  heart failure? No   5. Do you currently have a cold, bronchitis or symptoms of other infection? No   6. Do you have a cough, shortness of breath, or wheezing? No   7. Do you or anyone in your family have previous history of blood clots? YES -    8. Do you or does anyone in your family have a serious bleeding problem such as prolonged bleeding following surgeries or cuts? No   9. Have you ever had problems with anemia or been told to take iron pills? No   10. Have you had any abnormal blood loss such as black, tarry or bloody stools, or abnormal vaginal bleeding? No   11. Have you ever had a blood transfusion? No   12. Are you willing to have a blood transfusion if it is medically needed before, during, or after your surgery? Yes   13. Have you or any of your relatives ever had problems with anesthesia? UNKNOWN -    14. Do you have sleep apnea, excessive snoring or daytime drowsiness? YES -    14a. Do you have a CPAP machine? No   15. Do you have any artifical heart valves or other implanted medical devices like a pacemaker, defibrillator, or continuous glucose monitor? No   16. Do you have artificial joints? No   17. Are you allergic to latex? No   18. Is there any chance that you may be pregnant? No       Health Care Directive:  Patient does not have a Health Care Directive or Living Will: Discussed advance care planning with patient; however, patient declined at this time.    Preoperative Review of :   reviewed - no record of controlled substances prescribed.      Status of Chronic Conditions:  See problem list for active medical problems.  Problems all longstanding and stable, except as noted/documented.  See ROS for pertinent symptoms related to these conditions.      Review of Systems  Constitutional, neuro, ENT, endocrine,  pulmonary, cardiac, gastrointestinal, genitourinary, musculoskeletal, integument and psychiatric systems are negative, except as otherwise noted.    Patient Active Problem List    Diagnosis Date Noted     Bilateral chronic vestibular neuritis 2020     Priority: Medium     Vertigo 2020     Priority: Medium     Bloating 2015     Priority: Medium     Ovarian cyst 2015     Priority: Medium     Dependent edema 2015     Priority: Medium     CARDIOVASCULAR SCREENING; LDL GOAL LESS THAN 160 2012     Priority: Medium     Recurrent miscarriages 01/10/2012     Priority: Medium      Past Medical History:   Diagnosis Date     Bilateral chronic vestibular neuritis 2020     Dependent edema 2015     Miscarriage     2 in past     Past Surgical History:   Procedure Laterality Date     NOSE SURGERY  2009    cosmetic reasons     Current Outpatient Medications   Medication Sig Dispense Refill     omeprazole (PRILOSEC) 20 MG DR capsule Take 1 capsule (20 mg) by mouth daily 90 capsule 1     predniSONE (DELTASONE) 10 MG tablet Take 5 tablets for 3 days, then 4 for 3 days, 3 for 3 days, 2 for 3 days, 1 for 3 days. (Patient not taking: Reported on 12/3/2021) 45 tablet 0     simethicone (MYLICON) 80 MG chewable tablet Take 1-2 tablets ( mg) by mouth every 6 hours as needed for flatulence or cramping (Patient not taking: Reported on 2021) 90 tablet 1       Allergies   Allergen Reactions     Clomid [Clomiphene Citrate] Other (See Comments)     Presumed Ovarian hyperstimulation        Social History     Tobacco Use     Smoking status: Former Smoker     Packs/day: 0.50     Years: 20.00     Pack years: 10.00     Types: Cigarettes     Quit date: 2010     Years since quittin.6     Smokeless tobacco: Former User     Quit date: 12/3/2010   Substance Use Topics     Alcohol use: Yes     Alcohol/week: 0.0 standard drinks     Comment: seldom use       History   Drug Use No          Objective     BP (!) 164/96   Pulse 92   Temp 98.1  F (36.7  C) (Oral)     Physical Exam    GENERAL APPEARANCE: healthy, alert and no distress     EYES: EOMI, PERRL     HENT: ear canals and TM's normal and nose and mouth without ulcers or lesions     NECK: no adenopathy, no asymmetry, masses, or scars and thyroid normal to palpation     RESP: lungs clear to auscultation - no rales, rhonchi or wheezes     CV: regular rates and rhythm, normal S1 S2, no S3 or S4 and no murmur, click or rub     ABDOMEN:  soft, nontender, no HSM or masses and bowel sounds normal     MS: extremities normal- no gross deformities noted, no evidence of inflammation in joints, FROM in all extremities.     SKIN: no suspicious lesions or rashes     NEURO: Normal strength and tone, sensory exam grossly normal, mentation intact and speech normal     PSYCH: mentation appears normal. and affect normal/bright     LYMPHATICS: No cervical adenopathy    Recent Labs   Lab Test 11/12/21  1534 09/21/21  1510   HGB 13.7 13.7   * 300   NA  --  140   POTASSIUM  --  5.2   CR  --  0.80   A1C  --  5.9*        Diagnostics:  Labs pending at this time.  Results will be reviewed when available.   No EKG required for low risk surgery (cataract, skin procedure, breast biopsy, etc).    Revised Cardiac Risk Index (RCRI):  The patient has the following serious cardiovascular risks for perioperative complications:   - No serious cardiac risks = 0 points     RCRI Interpretation: 0 points: Class I (very low risk - 0.4% complication rate)           Signed Electronically by: Den Beckwith MD  Copy of this evaluation report is provided to requesting physician.

## 2022-03-19 ENCOUNTER — HEALTH MAINTENANCE LETTER (OUTPATIENT)
Age: 52
End: 2022-03-19

## 2022-04-21 ENCOUNTER — ANCILLARY PROCEDURE (OUTPATIENT)
Dept: MAMMOGRAPHY | Facility: CLINIC | Age: 52
End: 2022-04-21
Attending: OBSTETRICS & GYNECOLOGY
Payer: COMMERCIAL

## 2022-04-21 DIAGNOSIS — Z12.31 VISIT FOR SCREENING MAMMOGRAM: ICD-10-CM

## 2022-04-21 PROCEDURE — 77067 SCR MAMMO BI INCL CAD: CPT | Mod: GC

## 2022-04-21 PROCEDURE — 77063 BREAST TOMOSYNTHESIS BI: CPT | Mod: GC

## 2023-01-14 ENCOUNTER — HEALTH MAINTENANCE LETTER (OUTPATIENT)
Age: 53
End: 2023-01-14

## 2023-04-18 ENCOUNTER — DOCUMENTATION ONLY (OUTPATIENT)
Dept: LAB | Facility: CLINIC | Age: 53
End: 2023-04-18
Payer: COMMERCIAL

## 2023-04-18 NOTE — PROGRESS NOTES
Radha Meredithkarlcorey has an upcoming lab appointment:    Future Appointments   Date Time Provider Department Center   4/21/2023 11:45 AM [facility]   5/11/2023  1:45 PM Renzo Garsia MD [facility]     Patient is scheduled for the following lab(s):    There is no order available. Please review and place either future orders or HMPO (Review of Health Maintenance Protocol Orders), as appropriate.    Health Maintenance Due   Topic     HIV SCREENING      ANNUAL REVIEW OF HM ORDERS      Baljit Singer

## 2023-04-23 ENCOUNTER — HEALTH MAINTENANCE LETTER (OUTPATIENT)
Age: 53
End: 2023-04-23

## 2023-05-17 ENCOUNTER — OFFICE VISIT (OUTPATIENT)
Dept: OBGYN | Facility: CLINIC | Age: 53
End: 2023-05-17
Payer: COMMERCIAL

## 2023-05-17 VITALS
OXYGEN SATURATION: 99 % | WEIGHT: 211 LBS | BODY MASS INDEX: 35.27 KG/M2 | SYSTOLIC BLOOD PRESSURE: 155 MMHG | DIASTOLIC BLOOD PRESSURE: 87 MMHG | HEART RATE: 87 BPM

## 2023-05-17 DIAGNOSIS — N64.4 BREAST PAIN, LEFT: Primary | ICD-10-CM

## 2023-05-17 PROCEDURE — 99213 OFFICE O/P EST LOW 20 MIN: CPT | Performed by: OBSTETRICS & GYNECOLOGY

## 2023-05-17 NOTE — PROGRESS NOTES
Radha Crockett is a 52 year old year old female, , who presents today with breast pain.  She has had medial sharp left breast pain, with radiation to the left axillae.  She has had some pulsating symptoms.  she had the pain for about 10 days.  The right breast occasionally has some pain.     Modifying factors: hot showers might help, but she isn't sure .   Associated signs/symptoms: none .  Family history of breast cancer:  No   Previous breast surgery:  No   Previous pelvic surgery:  No   Hormones used:   Currently no, in past none   Nipple discharge:  No   Skin changes:  No   History of breast feeding:  No   Previous mammogram:  Yes   Previous ultrasound:  Yes, about 5 years ago.       Past Medical History:   Diagnosis Date     Bilateral chronic vestibular neuritis 2020     Dependent edema 2015     Miscarriage     2 in past       Past Surgical History:   Procedure Laterality Date     NOSE SURGERY  2009    cosmetic reasons       OB History    Para Term  AB Living   4 0 0 0 4 0   SAB IAB Ectopic Multiple Live Births   3 1 0 0 0      # Outcome Date GA Lbr Ezequiel/2nd Weight Sex Delivery Anes PTL Lv   4 SAB            3 IAB            2 SAB            1 SAB                Allergies   Allergen Reactions     Clomid [Clomiphene Citrate] Other (See Comments)     Presumed Ovarian hyperstimulation       Current Outpatient Medications   Medication Sig Dispense Refill     omeprazole (PRILOSEC) 20 MG DR capsule Take 1 capsule (20 mg) by mouth daily (Patient not taking: Reported on 2023) 90 capsule 1     predniSONE (DELTASONE) 10 MG tablet Take 5 tablets for 3 days, then 4 for 3 days, 3 for 3 days, 2 for 3 days, 1 for 3 days. (Patient not taking: Reported on 12/3/2021) 45 tablet 0     simethicone (MYLICON) 80 MG chewable tablet Take 1-2 tablets ( mg) by mouth every 6 hours as needed for flatulence or cramping (Patient not taking: Reported on 2021) 90 tablet 1       Social History      Socioeconomic History     Marital status:      Spouse name: Not on file     Number of children: Not on file     Years of education: Not on file     Highest education level: Not on file   Occupational History     Not on file   Tobacco Use     Smoking status: Former     Packs/day: 0.50     Years: 20.00     Pack years: 10.00     Types: Cigarettes     Quit date: 2010     Years since quittin.8     Smokeless tobacco: Former     Quit date: 12/3/2010   Vaping Use     Vaping status: Not on file   Substance and Sexual Activity     Alcohol use: Yes     Alcohol/week: 0.0 standard drinks of alcohol     Comment: seldom use     Drug use: No     Sexual activity: Yes     Partners: Male   Other Topics Concern     Parent/sibling w/ CABG, MI or angioplasty before 65F 55M? Not Asked   Social History Narrative    Lives at home with her .       Social Determinants of Health     Financial Resource Strain: Not on file   Food Insecurity: Not on file   Transportation Needs: Not on file   Physical Activity: Not on file   Stress: Not on file   Social Connections: Not on file   Intimate Partner Violence: Not on file   Housing Stability: Not on file       Family History   Problem Relation Age of Onset     Myocardial Infarction Father         at age 63 years      Diabetes Father      Gastrointestinal Disease Mother         GI bleeding, source unknown     Vascular Disease Mother      Leukemia Paternal Aunt           Review of Symptoms:    10 point ROS of systems including Constitutional, Eyes, Respiratory, Cardiovascular, Gastroenterology, Genitourinary, Integumentary, Muscularskeletal, Psychiatric were all negative except for pertinent positives noted in my HPI and in the PMH.           EXAM:  BP (!) 155/87 (BP Location: Right arm, Cuff Size: Adult Large)   Pulse 87   Wt 95.7 kg (211 lb)   LMP 2023 (Exact Date)   SpO2 99%   BMI 35.27 kg/m     General Appearance: Pleasant, alert, appropriate appearance for  age. No acute distress  Head Exam: Normal. Normocephalic, atraumatic.  Neck Exam: neck supple with no rigidity  Thyroid Exam: No nodules or enlargement., normal to palpation  Chest/Respiratory Exam: Normal chest wall and respirations.   Breast Exam: No dimpling, nipple retraction or discharge. No masses or nodes, normal breast tissue bilaterally  Gastrointestinal Exam: Soft, nontender, no abnormal masses or organomegaly.  Musculoskeletal Exam: Back is straight and non-tender, full ROM of upper and lower extremities.  Skin: no rash or abnormalities  Neurologic Exam: Normal gross motor movement, tone, and coordination. No tremor.  Psychiatric Exam: Alert and oriented, appropriate affect.      ASSESSMENT:  Left breast pain, persistent and recurrent     PLAN:  Schedule the ultrasound and the mammogram.  Recommend yearly 3D.   RTC for hot flashes discussion after the breast evaluation by radiology.     Renzo Garsia MD  5/17/2023

## 2023-05-19 ENCOUNTER — OFFICE VISIT (OUTPATIENT)
Dept: FAMILY MEDICINE | Facility: CLINIC | Age: 53
End: 2023-05-19
Payer: COMMERCIAL

## 2023-05-19 VITALS
OXYGEN SATURATION: 98 % | SYSTOLIC BLOOD PRESSURE: 138 MMHG | BODY MASS INDEX: 35.02 KG/M2 | HEART RATE: 82 BPM | RESPIRATION RATE: 18 BRPM | WEIGHT: 210.2 LBS | HEIGHT: 65 IN | DIASTOLIC BLOOD PRESSURE: 87 MMHG

## 2023-05-19 DIAGNOSIS — E66.811 CLASS 1 OBESITY WITHOUT SERIOUS COMORBIDITY WITH BODY MASS INDEX (BMI) OF 34.0 TO 34.9 IN ADULT, UNSPECIFIED OBESITY TYPE: ICD-10-CM

## 2023-05-19 DIAGNOSIS — Z13.820 SCREENING FOR OSTEOPOROSIS: ICD-10-CM

## 2023-05-19 DIAGNOSIS — M25.562 ACUTE PAIN OF LEFT KNEE: ICD-10-CM

## 2023-05-19 DIAGNOSIS — Z00.00 ROUTINE HISTORY AND PHYSICAL EXAMINATION OF ADULT: Primary | ICD-10-CM

## 2023-05-19 DIAGNOSIS — R59.9 ADENOPATHY: ICD-10-CM

## 2023-05-19 DIAGNOSIS — E55.9 VITAMIN D DEFICIENCY: ICD-10-CM

## 2023-05-19 LAB
BASOPHILS # BLD AUTO: 0 10E3/UL (ref 0–0.2)
BASOPHILS NFR BLD AUTO: 0 %
EOSINOPHIL # BLD AUTO: 0.2 10E3/UL (ref 0–0.7)
EOSINOPHIL NFR BLD AUTO: 2 %
ERYTHROCYTE [DISTWIDTH] IN BLOOD BY AUTOMATED COUNT: 13.9 % (ref 10–15)
HCT VFR BLD AUTO: 41.2 % (ref 35–47)
HGB BLD-MCNC: 13.5 G/DL (ref 11.7–15.7)
LYMPHOCYTES # BLD AUTO: 3.4 10E3/UL (ref 0.8–5.3)
LYMPHOCYTES NFR BLD AUTO: 39 %
MCH RBC QN AUTO: 28.1 PG (ref 26.5–33)
MCHC RBC AUTO-ENTMCNC: 32.8 G/DL (ref 31.5–36.5)
MCV RBC AUTO: 86 FL (ref 78–100)
MONOCYTES # BLD AUTO: 0.6 10E3/UL (ref 0–1.3)
MONOCYTES NFR BLD AUTO: 6 %
NEUTROPHILS # BLD AUTO: 4.5 10E3/UL (ref 1.6–8.3)
NEUTROPHILS NFR BLD AUTO: 52 %
PLATELET # BLD AUTO: 307 10E3/UL (ref 150–450)
RBC # BLD AUTO: 4.81 10E6/UL (ref 3.8–5.2)
WBC # BLD AUTO: 8.6 10E3/UL (ref 4–11)

## 2023-05-19 PROCEDURE — 36415 COLL VENOUS BLD VENIPUNCTURE: CPT | Performed by: FAMILY MEDICINE

## 2023-05-19 PROCEDURE — 99396 PREV VISIT EST AGE 40-64: CPT | Performed by: FAMILY MEDICINE

## 2023-05-19 PROCEDURE — 82306 VITAMIN D 25 HYDROXY: CPT | Performed by: FAMILY MEDICINE

## 2023-05-19 PROCEDURE — 80061 LIPID PANEL: CPT | Performed by: FAMILY MEDICINE

## 2023-05-19 PROCEDURE — 80050 GENERAL HEALTH PANEL: CPT | Performed by: FAMILY MEDICINE

## 2023-05-19 RX ORDER — FLASH GLUCOSE SCANNING READER
EACH MISCELLANEOUS
Qty: 1 EACH | Refills: 0 | Status: CANCELLED | OUTPATIENT
Start: 2023-05-19

## 2023-05-19 ASSESSMENT — ENCOUNTER SYMPTOMS
WEAKNESS: 0
DIARRHEA: 0
JOINT SWELLING: 0
FEVER: 0
HEMATOCHEZIA: 0
BREAST MASS: 0
PARESTHESIAS: 0
HEADACHES: 0
SHORTNESS OF BREATH: 0
FREQUENCY: 1
DIZZINESS: 1
NERVOUS/ANXIOUS: 0
CONSTIPATION: 0
NAUSEA: 0
ARTHRALGIAS: 1
PALPITATIONS: 0
CHILLS: 0
HEMATURIA: 0
HEARTBURN: 0
ABDOMINAL PAIN: 0
MYALGIAS: 1
DYSURIA: 0
COUGH: 0
SORE THROAT: 0
EYE PAIN: 0

## 2023-05-19 ASSESSMENT — PAIN SCALES - GENERAL: PAINLEVEL: NO PAIN (0)

## 2023-05-19 NOTE — PROGRESS NOTES
SUBJECTIVE:   CC: Radha is an 52 year old who presents for preventive health visit.       2023    11:01 AM   Additional Questions   Roomed by Maame hackett   Accompanied by self     Healthy Habits:    Getting at least 3 servings of Calcium per day:  Yes    Bi-annual eye exam:  NO    Dental care twice a year:  NO    Sleep apnea or symptoms of sleep apnea:  Daytime drowsiness    Diet:  Regular (no restrictions)    Frequency of exercise:  None    Taking medications regularly:  Yes    Medication side effects:  None    PHQ-2 Total Score:    Additional concerns today:  No      Concerns   1. Neck glands   2. Osteoporisis: calcium and vit D; osteoporosis    3. Knee pain x 2 episodes, behind the knee took ibuprofen; was shooting down   4. Blood work;    5. Weight Loss: hard to start exercising; gets tired easily     Shots:   Covid, shingles, tdap, flu: not doing any shots    Breast cancer screen: saw OB/gyn yesterday  Mammo:    Obesity  Wt Readings from Last 4 Encounters:   23 95.3 kg (210 lb 3.2 oz)   23 95.7 kg (211 lb)   21 99.8 kg (220 lb)   21 99.8 kg (220 lb)     care; covid#1, shingrix#1, lung, tdap, flu, mammo(), hpv ()      Have you ever done Advance Care Planning? (For example, a Health Directive, POLST, or a discussion with a medical provider or your loved ones about your wishes): No, advance care planning information given to patient to review.  Patient declined advance care planning discussion at this time.    Social History     Tobacco Use     Smoking status: Former     Packs/day: 0.50     Years: 20.00     Pack years: 10.00     Types: Cigarettes     Quit date: 2010     Years since quittin.8     Smokeless tobacco: Former     Quit date: 12/3/2010   Vaping Use     Vaping status: Not on file   Substance Use Topics     Alcohol use: Yes     Alcohol/week: 0.0 standard drinks of alcohol     Comment: seldom use           2023    11:02 AM   Alcohol Use   Prescreen: >3  drinks/day or >7 drinks/week? No          View : No data to display.              Reviewed orders with patient.  Reviewed health maintenance and updated orders accordingly - Yes  Patient Active Problem List   Diagnosis     Recurrent miscarriages     CARDIOVASCULAR SCREENING; LDL GOAL LESS THAN 160     Ovarian cyst     Dependent edema     Bloating     Vertigo     Bilateral chronic vestibular neuritis     Past Surgical History:   Procedure Laterality Date     NOSE SURGERY  2009    cosmetic reasons       Social History     Tobacco Use     Smoking status: Former     Packs/day: 0.50     Years: 20.00     Pack years: 10.00     Types: Cigarettes     Quit date: 2010     Years since quittin.8     Smokeless tobacco: Former     Quit date: 12/3/2010   Vaping Use     Vaping status: Not on file   Substance Use Topics     Alcohol use: Yes     Alcohol/week: 0.0 standard drinks of alcohol     Comment: seldom use     Family History   Problem Relation Age of Onset     Myocardial Infarction Father         at age 63 years      Diabetes Father      Gastrointestinal Disease Mother         GI bleeding, source unknown     Vascular Disease Mother      Leukemia Paternal Aunt            Breast Cancer Screening:        3/1/2022    10:24 AM   Breast CA Risk Assessment (FHS-7)   Do you have a family history of breast, colon, or ovarian cancer? No / Unknown       click delete button to remove this line now  Mammogram Screening: Recommended annual mammography  Pertinent mammograms are reviewed under the imaging tab.    History of abnormal Pap smear: NO - age 30-65 PAP every 5 years with negative HPV co-testing recommended      Latest Ref Rng & Units 2021     2:32 PM 2021     2:15 PM 2015    12:00 AM   PAP / HPV   PAP (Historical)  NIL    OTHER-NIL, See Result     HPV 16 DNA NEG^Negative  Negative      HPV 18 DNA NEG^Negative  Negative      Other HR HPV NEG^Negative  Negative        Reviewed and updated as needed this  "visit by clinical staff   Tobacco  Allergies  Meds              Reviewed and updated as needed this visit by Provider                     Review of Systems   Constitutional: Negative for chills and fever.   HENT: Positive for ear pain. Negative for congestion, hearing loss and sore throat.    Eyes: Negative for pain and visual disturbance.   Respiratory: Negative for cough and shortness of breath.    Cardiovascular: Positive for chest pain. Negative for palpitations and peripheral edema.   Gastrointestinal: Negative for abdominal pain, constipation, diarrhea, heartburn, hematochezia and nausea.   Breasts:  Positive for tenderness. Negative for breast mass and discharge.   Genitourinary: Positive for frequency. Negative for dysuria, genital sores, hematuria, pelvic pain, urgency, vaginal bleeding and vaginal discharge.   Musculoskeletal: Positive for arthralgias and myalgias. Negative for joint swelling.   Skin: Negative for rash.   Neurological: Positive for dizziness. Negative for weakness, headaches and paresthesias.   Psychiatric/Behavioral: Positive for mood changes. The patient is not nervous/anxious.         OBJECTIVE:   /87 (BP Location: Left arm, Cuff Size: Adult Regular)   Pulse 82   Resp 18   Ht 1.66 m (5' 5.35\")   Wt 95.3 kg (210 lb 3.2 oz)   LMP 05/03/2023 (Exact Date)   SpO2 98%   BMI 34.60 kg/m    Physical Exam  GENERAL APPEARANCE: healthy, alert and no distress  EYES: Eyes grossly normal to inspection, PERRL and conjunctivae and sclerae normal  HENT: ear canals and TM's normal, nose and mouth without ulcers or lesions, oropharynx clear and oral mucous membranes moist  NECK: no adenopathy, no asymmetry, masses, or scars and thyroid normal to palpation  RESP: lungs clear to auscultation - no rales, rhonchi or wheezes  BREAST: normal without masses, tenderness or nipple discharge and no palpable axillary masses or adenopathy  CV: regular rate and rhythm, normal S1 S2, no S3 or S4, no " murmur, click or rub, no peripheral edema and peripheral pulses strong  ABDOMEN: soft, nontender, no hepatosplenomegaly, no masses and bowel sounds normal  MS: no musculoskeletal defects are noted and gait is age appropriate without ataxia  SKIN: no suspicious lesions or rashes  NEURO: Normal strength and tone, sensory exam grossly normal, mentation intact and speech normal  PSYCH: mentation appears normal and affect normal/bright    Diagnostic Test Results:  Labs reviewed in Epic    ASSESSMENT/PLAN:   Radha was seen today for physical.    Diagnoses and all orders for this visit:    Routine history and physical examination of adult  -     Comprehensive metabolic panel (BMP + Alb, Alk Phos, ALT, AST, Total. Bili, TP); Future  -     Lipid Profile (Chol, Trig, HDL, LDL calc); Future  -     CBC with platelets and differential; Future  -     Comprehensive metabolic panel (BMP + Alb, Alk Phos, ALT, AST, Total. Bili, TP)  -     Lipid Profile (Chol, Trig, HDL, LDL calc)  -     CBC with platelets and differential    BMI 34.0-34.9,adult     Discussed weight loss; interested in a medication, been unable to make progress otherwise; will try Ozempic  -     TSH with free T4 reflex; Future  -     semaglutide (OZEMPIC) 2 MG/3ML pen; Inject 0.25 mg Subcutaneous every 7 days for 56 days  -     TSH with free T4 reflex    Neck Glands    No thyromegaly or lymphadenopathy. Will check TSH        -     TSH with free T4 reflex; Future  Screening for osteoporosis  -     Vitamin D Deficiency; Future  -     Vitamin D Deficiency    Acute pain of left knee (posterior)         -   Possible was strain, now improved.    Class 1 obesity without serious comorbidity with body mass index (BMI) of 34.0 to 34.9 in adult, unspecified obesity type  -     semaglutide (OZEMPIC) 2 MG/3ML pen; Inject 0.25 mg Subcutaneous every 7 days for 56 days      Patient has been advised of split billing requirements and indicates understanding:  Yes      COUNSELING:  Reviewed preventive health counseling, as reflected in patient instructions       Regular exercise       Healthy diet/nutrition       Immunizations    Declined: Covid-19, Influenza, TDAP and Zoster due to Other Personal             Osteoporosis prevention/bone health      She reports that she quit smoking about 12 years ago. Her smoking use included cigarettes. She has a 10.00 pack-year smoking history. She quit smokeless tobacco use about 12 years ago.      {Counseling Resources  [place] Preventive Services Task Force  Cholesterol Screening  Health diet/nutrition  Pooled Cohorts Equation Calculator  [employer]'s MyPlate  ASA Prophylaxis  Lung CA Screening  Osteoporosis prevention/bone health  {Breast Cancer Risk Calculator  BRCA-Related Cancer Risk Assessment FHS-7 Tool   Azam Brito MD  [facility]

## 2023-05-20 LAB
ALBUMIN SERPL BCG-MCNC: 4.6 G/DL (ref 3.5–5.2)
ALP SERPL-CCNC: 99 U/L (ref 35–104)
ALT SERPL W P-5'-P-CCNC: 26 U/L (ref 10–35)
ANION GAP SERPL CALCULATED.3IONS-SCNC: 15 MMOL/L (ref 7–15)
AST SERPL W P-5'-P-CCNC: 18 U/L (ref 10–35)
BILIRUB SERPL-MCNC: 0.2 MG/DL
BUN SERPL-MCNC: 18.5 MG/DL (ref 6–20)
CALCIUM SERPL-MCNC: 9.9 MG/DL (ref 8.6–10)
CHLORIDE SERPL-SCNC: 111 MMOL/L (ref 98–107)
CHOLEST SERPL-MCNC: 236 MG/DL
CREAT SERPL-MCNC: 0.77 MG/DL (ref 0.51–0.95)
DEPRECATED CALCIDIOL+CALCIFEROL SERPL-MC: 18 UG/L (ref 20–75)
DEPRECATED HCO3 PLAS-SCNC: 26 MMOL/L (ref 22–29)
GFR SERPL CREATININE-BSD FRML MDRD: >90 ML/MIN/1.73M2
GLUCOSE SERPL-MCNC: 99 MG/DL (ref 70–99)
HDLC SERPL-MCNC: 50 MG/DL
LDLC SERPL CALC-MCNC: 162 MG/DL
NONHDLC SERPL-MCNC: 186 MG/DL
POTASSIUM SERPL-SCNC: 5.3 MMOL/L (ref 3.4–5.3)
PROT SERPL-MCNC: 7.6 G/DL (ref 6.4–8.3)
SODIUM SERPL-SCNC: 152 MMOL/L (ref 136–145)
TRIGL SERPL-MCNC: 119 MG/DL
TSH SERPL DL<=0.005 MIU/L-ACNC: 2.44 UIU/ML (ref 0.3–4.2)

## 2023-06-02 ENCOUNTER — ANCILLARY PROCEDURE (OUTPATIENT)
Dept: MAMMOGRAPHY | Facility: CLINIC | Age: 53
End: 2023-06-02
Attending: OBSTETRICS & GYNECOLOGY
Payer: COMMERCIAL

## 2023-06-02 ENCOUNTER — ANCILLARY PROCEDURE (OUTPATIENT)
Dept: ULTRASOUND IMAGING | Facility: CLINIC | Age: 53
End: 2023-06-02
Attending: OBSTETRICS & GYNECOLOGY
Payer: COMMERCIAL

## 2023-06-02 DIAGNOSIS — N64.4 BREAST PAIN, LEFT: ICD-10-CM

## 2023-06-02 PROCEDURE — 77066 DX MAMMO INCL CAD BI: CPT | Performed by: RADIOLOGY

## 2023-06-02 PROCEDURE — 76642 ULTRASOUND BREAST LIMITED: CPT | Mod: LT | Performed by: RADIOLOGY

## 2023-06-02 PROCEDURE — G0279 TOMOSYNTHESIS, MAMMO: HCPCS | Performed by: RADIOLOGY

## 2023-09-20 ENCOUNTER — OFFICE VISIT (OUTPATIENT)
Dept: OBGYN | Facility: CLINIC | Age: 53
End: 2023-09-20
Payer: COMMERCIAL

## 2023-09-20 VITALS
WEIGHT: 197.4 LBS | BODY MASS INDEX: 32.49 KG/M2 | OXYGEN SATURATION: 96 % | SYSTOLIC BLOOD PRESSURE: 135 MMHG | HEART RATE: 98 BPM | DIASTOLIC BLOOD PRESSURE: 82 MMHG

## 2023-09-20 DIAGNOSIS — N95.1 SYMPTOMATIC MENOPAUSAL OR FEMALE CLIMACTERIC STATES: Primary | ICD-10-CM

## 2023-09-20 PROCEDURE — 99215 OFFICE O/P EST HI 40 MIN: CPT | Performed by: OBSTETRICS & GYNECOLOGY

## 2023-09-20 NOTE — PROGRESS NOTES
Radha is a 53 year old  female   She presents today with hot flashes starting about 3 years ago.  She missed only her August menses.  She has had episodes of being more irate and irritated with others.  She states she is able to sleep at night with the use of 1/6 CBD gummy.    She has not noted any aggravating or alleviating factors.        Past Medical History:   Diagnosis Date    Bilateral chronic vestibular neuritis 2020    Dependent edema 2015    Miscarriage     2 in past       Past Surgical History:   Procedure Laterality Date    NOSE SURGERY  2009    cosmetic reasons       OB History    Para Term  AB Living   4 0 0 0 4 0   SAB IAB Ectopic Multiple Live Births   3 1 0 0 0      # Outcome Date GA Lbr Ezequiel/2nd Weight Sex Delivery Anes PTL Lv   4 SAB            3 IAB            2 SAB            1 SAB                Gynecological History            Patient's last menstrual period was 2023 (exact date).         see above HPI        Allergies   Allergen Reactions    Clomid [Clomiphene Citrate] Other (See Comments)     Presumed Ovarian hyperstimulation       Current Outpatient Medications   Medication Sig Dispense Refill    vitamin D3 (CHOLECALCIFEROL) 1.25 MG (07792 UT) capsule Take 1 capsule (50,000 Units) by mouth every 7 days (Patient not taking: Reported on 2023) 8 capsule 0       Social History     Socioeconomic History    Marital status:      Spouse name: Not on file    Number of children: Not on file    Years of education: Not on file    Highest education level: Not on file   Occupational History    Not on file   Tobacco Use    Smoking status: Former     Packs/day: 0.50     Years: 20.00     Pack years: 10.00     Types: Cigarettes     Quit date: 2010     Years since quittin.2    Smokeless tobacco: Former     Quit date: 12/3/2010   Substance and Sexual Activity    Alcohol use: Yes     Alcohol/week: 0.0 standard drinks of alcohol     Comment: seldom  use    Drug use: No    Sexual activity: Yes     Partners: Male   Other Topics Concern    Parent/sibling w/ CABG, MI or angioplasty before 65F 55M? Not Asked   Social History Narrative    Lives at home with her .       Social Determinants of Health     Financial Resource Strain: Not on file   Food Insecurity: Not on file   Transportation Needs: Not on file   Physical Activity: Not on file   Stress: Not on file   Social Connections: Not on file   Interpersonal Safety: Not on file   Housing Stability: Not on file       Family History   Problem Relation Age of Onset    Myocardial Infarction Father         at age 63 years     Diabetes Father     Gastrointestinal Disease Mother         GI bleeding, source unknown    Vascular Disease Mother     Leukemia Paternal Aunt          Review of Systems:  10 point ROS of systems including Constitutional, Eyes, Respiratory, Cardiovascular, Gastroenterology, Genitourinary, Integumentary, Muscularskeletal, Psychiatric were all negative except for pertinent positives noted in my HPI and in the PMH.        EXAM:  /82 (BP Location: Right arm, Cuff Size: Adult Regular)   Pulse 98   Wt 89.5 kg (197 lb 6.4 oz)   LMP 07/18/2023 (Exact Date)   SpO2 96%   BMI 32.49 kg/m    Body mass index is 32.49 kg/m .  General:  WNWD female, NAD  Alert  Oriented x 3  Gait:  Normal  Skin:  Normal skin turgor  HEENT:  NC/AT, EOMI  Neck:  No masses noted, symmetrical  Lungs:  Good respiratory effort   Abdomen:  Non-tender, non-distended.  Pelvic exam:  not performed   Extremities:  No clubbing, cyanosis or edema.         ASSESSMENT:  Menopausal symptoms with mood swings.       PLAN:   We reviewed the I study that found increase risk in VTE and the cardiovascular incidents associated with the HT.  The I was developed with the primary end point related to cardiac disease.  The initial reports showed an increase risk in the first year, equally out over the next couple of years, and then the  following years the HT was beneficial.   New information not associated with the WHI, has suggested the benefit of HT regarding cardiac disease, and other medications, such as the statins, do not have beneficial profiles.  I also reviewed with the patient the slight increase in breast cancer in the combined HRT arm (not the ERT arm regarding breast CA).  I also reviewed with her the Confidence Interval with her and the CI was from 1.0 to 1.59.  Eventhough the breast cancer risk increased by 24%, It is not statistically significant since the CI hit 1.  We also reviewed Yuli Recio's last study of the meta-analysis of the best studies regarding HT back to the 50s.  Her conclusion was no conclusive evidence exists showing increase risk with breast cancer.  I also reviewed with her about the real benefits which include the osteoporosis and fracture risk reduction.  This discussion also dealt with the other medications for fracture risk reduction.  I also reviewed with her about the incidence of colon cancer of 1 in 10 in the general population, but the WHI showed reduction in the combination arm that was significant with the CI range of 0.89 to 0.53.  The theoretical risk regarding the breast cancer risk and the benefits regarding colon cancer are likely related to the progestin use.   We discussed the current recommendation to be on the lowest HT dose possible, for the shortest amount of time.  Other studies are currently being performed, which may change the recommendations.  I recommend to review the HT use on a yearly basis as new information may be released, impacting her choice.    Since she is not postmenopausal yet, I suggest to consider the use of OCPs for menopausal symptoms so ovarian suppression occurs and bleeding irregularities do not start.    RTC 2.5 months for OCP check.   Questions seem to be answered and she voiced her understanding.      Total time preparing to see patient with reviewing prior  encounter and labs, face to face time,  and coordinating care on the same calendar date:   45 minutes.     Renzo Garsia MD

## 2024-03-08 ENCOUNTER — OFFICE VISIT (OUTPATIENT)
Dept: FAMILY MEDICINE | Facility: CLINIC | Age: 54
End: 2024-03-08
Payer: COMMERCIAL

## 2024-03-08 VITALS
TEMPERATURE: 98.2 F | BODY MASS INDEX: 35.06 KG/M2 | DIASTOLIC BLOOD PRESSURE: 92 MMHG | WEIGHT: 213 LBS | HEART RATE: 86 BPM | RESPIRATION RATE: 16 BRPM | SYSTOLIC BLOOD PRESSURE: 150 MMHG | OXYGEN SATURATION: 98 %

## 2024-03-08 DIAGNOSIS — E55.9 VITAMIN D DEFICIENCY: ICD-10-CM

## 2024-03-08 DIAGNOSIS — R73.03 PREDIABETES: ICD-10-CM

## 2024-03-08 DIAGNOSIS — Z13.6 ENCOUNTER FOR LIPID SCREENING FOR CARDIOVASCULAR DISEASE: ICD-10-CM

## 2024-03-08 DIAGNOSIS — Z13.220 ENCOUNTER FOR LIPID SCREENING FOR CARDIOVASCULAR DISEASE: ICD-10-CM

## 2024-03-08 DIAGNOSIS — E87.0 HYPERNATREMIA: Primary | ICD-10-CM

## 2024-03-08 LAB — HBA1C MFR BLD: 5.6 % (ref 0–5.6)

## 2024-03-08 PROCEDURE — 82306 VITAMIN D 25 HYDROXY: CPT | Performed by: FAMILY MEDICINE

## 2024-03-08 PROCEDURE — 99214 OFFICE O/P EST MOD 30 MIN: CPT | Performed by: FAMILY MEDICINE

## 2024-03-08 PROCEDURE — 80061 LIPID PANEL: CPT | Performed by: FAMILY MEDICINE

## 2024-03-08 PROCEDURE — 36415 COLL VENOUS BLD VENIPUNCTURE: CPT | Performed by: FAMILY MEDICINE

## 2024-03-08 PROCEDURE — 83036 HEMOGLOBIN GLYCOSYLATED A1C: CPT | Performed by: FAMILY MEDICINE

## 2024-03-08 PROCEDURE — 80053 COMPREHEN METABOLIC PANEL: CPT | Performed by: FAMILY MEDICINE

## 2024-03-08 NOTE — PROGRESS NOTES
"  Assessment & Plan       ICD-10-CM    1. Hypernatremia  E87.0 Comprehensive metabolic panel     Comprehensive metabolic panel      2. Prediabetes  R73.03 Hemoglobin A1c     Hemoglobin A1c      3. Vitamin D deficiency  E55.9 Vitamin D deficiency screening     Vitamin D deficiency screening      4. Encounter for lipid screening for cardiovascular disease  Z13.220 Lipid panel reflex to direct LDL Non-fasting    Z13.6 Lipid panel reflex to direct LDL Non-fasting            Review of external notes as documented elsewhere in note        BMI  Estimated body mass index is 35.06 kg/m  as calculated from the following:    Height as of 5/19/23: 1.66 m (5' 5.35\").    Weight as of this encounter: 96.6 kg (213 lb).   Weight management plan: Discussed healthy diet and exercise guidelines      There are no Patient Instructions on file for this visit.    Julius Garcia is a 53 year old, presenting for the following health issues:  Blood pressure        3/8/2024     3:37 PM   Additional Questions   Roomed by Leyla   Accompanied by none         3/8/2024     3:37 PM   Patient Reported Additional Medications   Patient reports taking the following new medications none     History of Present Illness       Reason for visit:  Blood pressure concerns    She eats 2-3 servings of fruits and vegetables daily.She consumes 2 sweetened beverage(s) daily.She exercises with enough effort to increase her heart rate 9 or less minutes per day.  She exercises with enough effort to increase her heart rate 3 or less days per week.   She is taking medications regularly.       BP Readings from Last 6 Encounters:   03/08/24 (!) 150/92   09/20/23 135/82   05/19/23 138/87   05/17/23 (!) 155/87   03/01/22 (!) 164/96   12/03/21 124/82       Wt Readings from Last 4 Encounters:   03/08/24 96.6 kg (213 lb)   09/20/23 89.5 kg (197 lb 6.4 oz)   05/19/23 95.3 kg (210 lb 3.2 oz)   05/17/23 95.7 kg (211 lb)           Dizziness, light headed 10 days ago  Low " pressures when checked at home  102/69  92/60  85/56    Last week, had dizziness  Pressures were elevated  144/99  150's/90's yesterday                  Review of Systems  Constitutional, HEENT, cardiovascular, pulmonary, gi and gu systems are negative, except as otherwise noted.      Objective    BP (!) 150/92   Pulse 86   Temp 98.2  F (36.8  C) (Temporal)   Resp 16   Wt 96.6 kg (213 lb)   SpO2 98%   BMI 35.06 kg/m    Body mass index is 35.06 kg/m .  Physical Exam  Vitals reviewed.   Constitutional:       General: She is not in acute distress.     Appearance: Normal appearance. She is well-developed.   HENT:      Head: Normocephalic and atraumatic.      Right Ear: External ear normal.      Left Ear: External ear normal.      Nose: Nose normal.   Eyes:      General: No scleral icterus.     Extraocular Movements: Extraocular movements intact.      Conjunctiva/sclera: Conjunctivae normal.   Cardiovascular:      Rate and Rhythm: Normal rate.   Pulmonary:      Effort: Pulmonary effort is normal.   Musculoskeletal:         General: No deformity. Normal range of motion.      Cervical back: Normal range of motion.   Skin:     General: Skin is warm and dry.      Findings: No rash.   Neurological:      Mental Status: She is alert and oriented to person, place, and time. Mental status is at baseline.      Gait: Gait normal.   Psychiatric:         Behavior: Behavior normal.         Thought Content: Thought content normal.         Judgment: Judgment normal.                    Signed Electronically by: Den Beckwith MD

## 2024-03-11 LAB
ALBUMIN SERPL BCG-MCNC: 4.5 G/DL (ref 3.5–5.2)
ALP SERPL-CCNC: 98 U/L (ref 40–150)
ALT SERPL W P-5'-P-CCNC: 28 U/L (ref 0–50)
ANION GAP SERPL CALCULATED.3IONS-SCNC: 10 MMOL/L (ref 7–15)
AST SERPL W P-5'-P-CCNC: 20 U/L (ref 0–45)
BILIRUB SERPL-MCNC: 0.3 MG/DL
BUN SERPL-MCNC: 14.1 MG/DL (ref 6–20)
CALCIUM SERPL-MCNC: 9.3 MG/DL (ref 8.6–10)
CHLORIDE SERPL-SCNC: 105 MMOL/L (ref 98–107)
CHOLEST SERPL-MCNC: 234 MG/DL
CREAT SERPL-MCNC: 0.96 MG/DL (ref 0.51–0.95)
DEPRECATED HCO3 PLAS-SCNC: 24 MMOL/L (ref 22–29)
EGFRCR SERPLBLD CKD-EPI 2021: 70 ML/MIN/1.73M2
FASTING STATUS PATIENT QL REPORTED: NO
GLUCOSE SERPL-MCNC: 106 MG/DL (ref 70–99)
HDLC SERPL-MCNC: 54 MG/DL
LDLC SERPL CALC-MCNC: 157 MG/DL
NONHDLC SERPL-MCNC: 180 MG/DL
POTASSIUM SERPL-SCNC: 5.6 MMOL/L (ref 3.4–5.3)
PROT SERPL-MCNC: 7.7 G/DL (ref 6.4–8.3)
SODIUM SERPL-SCNC: 139 MMOL/L (ref 135–145)
TRIGL SERPL-MCNC: 116 MG/DL
VIT D+METAB SERPL-MCNC: 22 NG/ML (ref 20–50)

## 2024-04-19 ENCOUNTER — PATIENT OUTREACH (OUTPATIENT)
Dept: CARE COORDINATION | Facility: CLINIC | Age: 54
End: 2024-04-19
Payer: COMMERCIAL

## 2024-05-03 ENCOUNTER — PATIENT OUTREACH (OUTPATIENT)
Dept: CARE COORDINATION | Facility: CLINIC | Age: 54
End: 2024-05-03
Payer: COMMERCIAL

## 2024-06-03 ENCOUNTER — ANCILLARY PROCEDURE (OUTPATIENT)
Dept: MAMMOGRAPHY | Facility: CLINIC | Age: 54
End: 2024-06-03
Attending: OBSTETRICS & GYNECOLOGY
Payer: COMMERCIAL

## 2024-06-03 DIAGNOSIS — Z12.31 VISIT FOR SCREENING MAMMOGRAM: ICD-10-CM

## 2024-06-03 PROCEDURE — 77063 BREAST TOMOSYNTHESIS BI: CPT | Mod: TC | Performed by: RADIOLOGY

## 2024-06-03 PROCEDURE — 77067 SCR MAMMO BI INCL CAD: CPT | Mod: TC | Performed by: RADIOLOGY

## 2024-06-04 NOTE — PROGRESS NOTES
SUBJECTIVE:   Radha Crockett is a 47 year old female who presents to clinic today for the following health issues:      Joint Pain    Onset: x3 weeks     Description:   Location: left and right ankles   Character: Sharp and Dull ache    Intensity: moderate, severe    Progression of Symptoms: worse    Accompanying Signs & Symptoms:  Other symptoms: radiation of pain to toes    History:   Previous similar pain: no       Precipitating factors:   Trauma or overuse: YES- was moving into a new house and going up and down stairs     Alleviating factors:  Improved by: rest/inactivity    Patient saw Dr. Brito on 10/13/2017. Swelling is not getting any better and it is getting more painful. Pt would like blood test for sodium levels.     Therapies Tried and outcome:    Patient states she has leg swelling w/ pain that's worse after being on her feet all day.  She takes lasix once per day, which has helped a little, however she's still concerned.  No fever, no redness, no recent leg injury (recent xrays were negative).  Patient states she has varicose veins and her mother has LE edema from varicose veins as well.    Problem list and histories reviewed & adjusted, as indicated.  Additional history: as documented    BP Readings from Last 3 Encounters:   10/26/17 132/74   10/13/17 124/64   06/14/17 136/90    Wt Readings from Last 3 Encounters:   06/14/17 195 lb (88.5 kg)   05/25/17 198 lb (89.8 kg)   05/21/16 193 lb (87.5 kg)        Reviewed and updated as needed this visit by clinical staff  Tobacco  Allergies  Meds  Problems  Med Hx  Surg Hx  Fam Hx  Soc Hx        Reviewed and updated as needed this visit by Provider  Allergies  Meds  Problems       ROS:  Constitutional, HEENT, cardiovascular, pulmonary, gi and gu systems are negative, except as otherwise noted.    OBJECTIVE:   /74  Pulse 93  Temp 97.3  F (36.3  C) (Oral)  SpO2 98%  There is no height or weight on file to calculate BMI.  GENERAL: healthy,  alert and no distress  EYES: Eyes grossly normal to inspection, PERRL and conjunctivae and sclerae normal  HENT: ear canals and TM's normal, nose and mouth without ulcers or lesions  NECK: no adenopathy, no asymmetry, masses, or scars and thyroid normal to palpation  RESP: lungs clear to auscultation - no rales, rhonchi or wheezes  CV: regular rate and rhythm, normal S1 S2, no S3 or S4, no murmur, click or rub, 2+ peripheral pulses  MS: no gross musculoskeletal defects noted, (+)LE edema bilaterally, non-pitting, mild generalized LE tenderness, poorly localized, (-)Karina's.  SKIN: no suspicious lesions or rashes  NEURO: Normal strength and tone, mentation intact and speech normal  PSYCH: mentation appears normal, affect normal/bright      ASSESSMENT/PLAN:     1. Venous (peripheral) insufficiency  Will obtain LE US to r/o DVT, will give compression stalkings to wear at night, continue elevation, discussed the importance of regular cardiovascular exercise to help with edema, will refer to vascular for further eval and treatment.  - VASCULAR REFERRAL  - order for DME; Compression stalkings to wear at night.  Dispense: 1 each; Refill: 0  - US Lower Extremity Venous Duplex Bilateral; Future    2. Bilateral leg edema  Will r/u kidney and electrolyte etiology, check u/s LE  - Comprehensive metabolic panel  - Albumin Random Urine Quantitative with Creat Ratio  - order for DME; Compression stalkings to wear at night.  Dispense: 1 each; Refill: 0  - US Lower Extremity Venous Duplex Bilateral; Future    3. Pain in joint, ankle and foot, left  Will give meloxicam so that patient may exercise without pain  - meloxicam (MOBIC) 15 MG tablet; Take 1 tablet (15 mg) by mouth daily  Dispense: 30 tablet; Refill: 1    F/u in 3 months for swelling    Den Beckwith MD  [facility]   Simple: Patient demonstrates quick and easy understanding/Verbalized Understanding

## 2024-07-06 ENCOUNTER — HEALTH MAINTENANCE LETTER (OUTPATIENT)
Age: 54
End: 2024-07-06

## 2024-07-31 ENCOUNTER — OFFICE VISIT (OUTPATIENT)
Dept: URGENT CARE | Facility: URGENT CARE | Age: 54
End: 2024-07-31
Payer: COMMERCIAL

## 2024-07-31 VITALS
SYSTOLIC BLOOD PRESSURE: 134 MMHG | HEART RATE: 98 BPM | DIASTOLIC BLOOD PRESSURE: 88 MMHG | RESPIRATION RATE: 18 BRPM | BODY MASS INDEX: 35.01 KG/M2 | TEMPERATURE: 98.4 F | WEIGHT: 212.7 LBS | OXYGEN SATURATION: 97 %

## 2024-07-31 DIAGNOSIS — B02.9 HERPES ZOSTER WITHOUT COMPLICATION: Primary | ICD-10-CM

## 2024-07-31 PROCEDURE — 99213 OFFICE O/P EST LOW 20 MIN: CPT | Performed by: STUDENT IN AN ORGANIZED HEALTH CARE EDUCATION/TRAINING PROGRAM

## 2024-07-31 RX ORDER — VALACYCLOVIR HYDROCHLORIDE 1 G/1
1000 TABLET, FILM COATED ORAL 3 TIMES DAILY
Qty: 21 TABLET | Refills: 0 | Status: SHIPPED | OUTPATIENT
Start: 2024-07-31 | End: 2024-08-07

## 2024-07-31 NOTE — PROGRESS NOTES
ASSESSMENT & PLAN:   Diagnoses and all orders for this visit:  Herpes zoster without complication  -     valACYclovir (VALTREX) 1000 mg tablet; Take 1 tablet (1,000 mg) by mouth 3 times daily for 7 days    Left axilla rash x 4 days. Consistent with shingles in C8-T1-T2 dermatome. Start Valtrex x 7 days. Tylenol/ibuprofen as needed.    At the end of the encounter, I discussed results, diagnosis, medications. Discussed red flags for immediate return to clinic/ER, as well as indications for follow up if no improvement. Patient and/or caregiver understood and agreed to plan. Patient was stable for discharge.    There are no Patient Instructions on file for this visit.    No follow-ups on file.    ------------------------------------------------------------------------  SUBJECTIVE  History was obtained from patient.    Patient presents with:  Dizziness: Dizziness for the past 6 hours, some nausea, no blurry vision or headache, tried to sleep it off today but didn't make it any better, chills  Insect Bites: Cluster of bug bites under left armpit - noticed it about 4 days ago, very itchy/painful/burning and pain is radiating to left arm     HPI  Radha Crockett is a(n) 53 year old female presenting to urgent care for bug bites on left axilla x 4 days. Started having pain in left axilla and breast, then developed a rash. Rash has become more red and flat since onset. Reports feeling nausea and chills due to the pain. No room spinning, presyncope, chest pain, shortness of breath. Tried taking leftover Augmentin which did not help rash.    Review of Systems    Current Outpatient Medications   Medication Sig Dispense Refill    valACYclovir (VALTREX) 1000 mg tablet Take 1 tablet (1,000 mg) by mouth 3 times daily for 7 days 21 tablet 0    norethindrone-ethinyl estradiol (ORTHO-NOVUM) 1-35 MG-MCG tablet Take 1 tablet by mouth daily (Patient not taking: Reported on 7/31/2024) 84 tablet 1    vitamin D3 (CHOLECALCIFEROL) 1.25 MG  (15810 UT) capsule Take 1 capsule (50,000 Units) by mouth every 7 days (Patient not taking: Reported on 2023) 8 capsule 0     Problem List:  2020: Bilateral chronic vestibular neuritis  2020-10: Benign paroxysmal positional vertigo, bilateral  2020: Vertigo  2015: Bloating  2015: Ovarian cyst  2015: Dependent edema  2012: Missed   2012: Encounter for supervision of other normal pregnancy  2012: CARDIOVASCULAR SCREENING; LDL GOAL LESS THAN 160  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   PSYCH: mentation appears normal. Normal affect  SKIN: left axilla with clustered erythematous scabbed macules approx 3 cm in diameter. Left upper back with few erythematous macules. Tenderness to palpation of left chest wall and left axilla.     No results found for any visits on 24.

## 2024-08-10 ENCOUNTER — OFFICE VISIT (OUTPATIENT)
Dept: URGENT CARE | Facility: URGENT CARE | Age: 54
End: 2024-08-10
Payer: COMMERCIAL

## 2024-08-10 VITALS
WEIGHT: 212 LBS | SYSTOLIC BLOOD PRESSURE: 144 MMHG | OXYGEN SATURATION: 97 % | DIASTOLIC BLOOD PRESSURE: 90 MMHG | BODY MASS INDEX: 34.9 KG/M2 | RESPIRATION RATE: 16 BRPM | TEMPERATURE: 97.7 F | HEART RATE: 95 BPM

## 2024-08-10 DIAGNOSIS — B02.7 DISSEMINATED HERPES ZOSTER: Primary | ICD-10-CM

## 2024-08-10 PROCEDURE — 99213 OFFICE O/P EST LOW 20 MIN: CPT | Performed by: FAMILY MEDICINE

## 2024-08-10 RX ORDER — PREDNISONE 20 MG/1
TABLET ORAL
Qty: 15 TABLET | Refills: 0 | Status: SHIPPED | OUTPATIENT
Start: 2024-08-10

## 2024-08-10 RX ORDER — VALACYCLOVIR HYDROCHLORIDE 1 G/1
1000 TABLET, FILM COATED ORAL 3 TIMES DAILY
Qty: 21 TABLET | Refills: 0 | Status: SHIPPED | OUTPATIENT
Start: 2024-08-10 | End: 2024-08-17

## 2024-08-10 RX ORDER — TRIAMCINOLONE ACETONIDE 1 MG/G
CREAM TOPICAL
Qty: 45 G | Refills: 0 | Status: SHIPPED | OUTPATIENT
Start: 2024-08-10

## 2024-08-10 NOTE — PROGRESS NOTES
Assessment & Plan     .(B02.7) Disseminated herpes zoster  (primary encounter diagnosis)  Comment:   Plan: valACYclovir (VALTREX) 1000 mg tablet,         predniSONE (DELTASONE) 20 MG tablet,         triamcinolone (KENALOG) 0.1 % external cream          Patient reports over a weeks ago when she was given Valtrex her rash got better.    Now for the past day or so she started having more of a blister rash develop at the same site, and lower abdomen area.  She has no fever no chills.  Her  rash, more consistent with herpetic zoster.  Advised with supportive care, good hygiene.  Given another course of Valtrex, prednisone, topical Kenalog cream, use as been prescribed.    Discussed with patient to consider, shingles vaccine in the next 8 weeks      Desi courtney MD  [facility]    Julius Garcia is a 53 year old female who presents to clinic today for the following health issues:  Chief Complaint   Patient presents with    Shingles     Shingles under left armpit.          8/10/2024     3:06 PM   Additional Questions   Roomed by Johann Szymanski   Accompanied by SELF       Review of Systems  Constitutional, HEENT, cardiovascular, pulmonary, gi and gu systems are negative, except as otherwise noted.      Objective    BP (!) 152/99   Pulse 95   Temp 97.7  F (36.5  C) (Tympanic)   Resp 16   Wt 96.2 kg (212 lb)   LMP 07/18/2024 (Exact Date)   SpO2 97%   BMI 34.90 kg/m    Physical Exam   GENERAL: alert and no distress  SKIN: excoriation -  vesicular type of lesion, uniform, left armpit area, left lower abd area, same side  PSYCH: mentation appears normal, affect normal/bright

## 2024-10-03 DIAGNOSIS — Z12.11 COLON CANCER SCREENING: ICD-10-CM

## 2024-10-17 ENCOUNTER — ORDERS ONLY (AUTO-RELEASED) (OUTPATIENT)
Dept: ADMISSION | Facility: CLINIC | Age: 54
End: 2024-10-17
Payer: COMMERCIAL

## 2024-10-17 DIAGNOSIS — Z12.11 COLON CANCER SCREENING: ICD-10-CM

## 2024-11-15 ENCOUNTER — PATIENT OUTREACH (OUTPATIENT)
Dept: CARE COORDINATION | Facility: CLINIC | Age: 54
End: 2024-11-15
Payer: COMMERCIAL

## 2025-01-07 ENCOUNTER — ANCILLARY PROCEDURE (OUTPATIENT)
Dept: GENERAL RADIOLOGY | Facility: CLINIC | Age: 55
End: 2025-01-07
Attending: PHYSICIAN ASSISTANT
Payer: COMMERCIAL

## 2025-01-07 ENCOUNTER — OFFICE VISIT (OUTPATIENT)
Dept: URGENT CARE | Facility: URGENT CARE | Age: 55
End: 2025-01-07
Payer: COMMERCIAL

## 2025-01-07 VITALS
BODY MASS INDEX: 36.21 KG/M2 | HEART RATE: 71 BPM | RESPIRATION RATE: 18 BRPM | TEMPERATURE: 98.1 F | OXYGEN SATURATION: 98 % | DIASTOLIC BLOOD PRESSURE: 90 MMHG | SYSTOLIC BLOOD PRESSURE: 161 MMHG | WEIGHT: 220 LBS

## 2025-01-07 DIAGNOSIS — M25.522 LEFT ELBOW PAIN: Primary | ICD-10-CM

## 2025-01-07 DIAGNOSIS — M25.522 LEFT ELBOW PAIN: ICD-10-CM

## 2025-01-07 DIAGNOSIS — R03.0 ELEVATED BLOOD PRESSURE READING WITHOUT DIAGNOSIS OF HYPERTENSION: ICD-10-CM

## 2025-01-07 PROCEDURE — 73080 X-RAY EXAM OF ELBOW: CPT | Mod: TC | Performed by: RADIOLOGY

## 2025-01-07 PROCEDURE — 99214 OFFICE O/P EST MOD 30 MIN: CPT | Performed by: PHYSICIAN ASSISTANT

## 2025-01-07 ASSESSMENT — PAIN SCALES - GENERAL: PAINLEVEL_OUTOF10: SEVERE PAIN (6)

## 2025-01-07 NOTE — PROGRESS NOTES
Chief Complaint   Patient presents with    Elbow Pain     X-ray-I see no obvious fracture    Results for orders placed or performed in visit on 01/07/25   XR Elbow Left G/E 3 Views     Status: None (Preliminary result)    Narrative    EXAM: XR ELBOW LEFT G/E 3 VIEWS  LOCATION: [facility]  DATE: 1/7/2025    INDICATION: x 1month, can't fully straighten, hurts lateral aspect. No obvious injury.  COMPARISON: None.      Impression    IMPRESSION: Normal joint spaces and alignment. No acute displaced left elbow fracture or sizable joint effusion.               ASSESSMENT:    ICD-10-CM    1. Left elbow pain  M25.522 XR Elbow Left G/E 3 Views     Orthopedic  Referral     diclofenac (VOLTAREN) 1 % topical gel      2. Elevated blood pressure reading without diagnosis of hypertension  R03.0               PLAN: Left elbow pain, suspect lateral epicondylitis.  Topical Voltaren.  Try ice instead of heat twice daily for 10 minutes.  She has to leave as the dogs and her  are in the car outside.  Ace wrap placed.  See Ortho.  Consider tennis elbow brace, ultrasound.  Advised about symptoms which might herald more serious problems.      Elevated blood pressure.  Recheck blood pressure at next visit and follow-up with primary if any concerns.      Elin Burgess PA-C      SUBJECTIVE:   Radha Crockett is an 54 year old female who presents with left elbow pain for 1 month.  No specific injury.  History 2016 mild left AC joint separation.  Does lift heavy objects all the time around the house.  Does have several large dogs that she walks.  Very painful to straighten her elbow fully, hurts over the lateral aspect.    Allergies   Allergen Reactions    Clomid [Clomiphene Citrate] Other (See Comments)     Presumed Ovarian hyperstimulation       Past Medical History:   Diagnosis Date    Bilateral chronic vestibular neuritis 12/31/2020    Dependent edema 8/19/2015    Miscarriage     2 in past        Current Outpatient Medications   Medication Sig Dispense Refill    norethindrone-ethinyl estradiol (ORTHO-NOVUM) 1-35 MG-MCG tablet Take 1 tablet by mouth daily (Patient not taking: Reported on 2024) 84 tablet 1    predniSONE (DELTASONE) 20 MG tablet 2 tab daily for 5 days, then one tab daily for 5 days, 15 tablet 0    triamcinolone (KENALOG) 0.1 % external cream Apply to area bid to tid for 5 days, then bidf as needed 45 g 0    valACYclovir (VALTREX) 1000 mg tablet Take 1 tablet (1,000 mg) by mouth 3 times daily for 7 days 21 tablet 0    valACYclovir (VALTREX) 1000 mg tablet Take 1 tablet (1,000 mg) by mouth 3 times daily for 7 days 21 tablet 0    vitamin D3 (CHOLECALCIFEROL) 1.25 MG (62002 UT) capsule Take 1 capsule (50,000 Units) by mouth every 7 days (Patient not taking: Reported on 2023) 8 capsule 0     No current facility-administered medications for this visit.       Social History     Tobacco Use    Smoking status: Former     Current packs/day: 0.00     Average packs/day: 0.5 packs/day for 20.0 years (10.0 ttl pk-yrs)     Types: Cigarettes     Start date: 1990     Quit date: 2010     Years since quittin.5    Smokeless tobacco: Former     Quit date: 12/3/2010   Substance Use Topics    Alcohol use: Yes     Alcohol/week: 0.0 standard drinks of alcohol     Comment: seldom use    Drug use: No       ROS:  Gen: no fevers  Musculoskel: + as above  Skin: as above    OBJECTIVE:  BP (!) 161/90 (BP Location: Right arm, Patient Position: Sitting, Cuff Size: Adult Regular)   Pulse 71   Temp 98.1  F (36.7  C) (Oral)   Resp 18   Wt 99.8 kg (220 lb)   LMP 2024 (Exact Date)   SpO2 98%   BMI 36.21 kg/m     General:   awake, alert, and cooperative.  NAD.   Head: Normocephalic, atraumatic.  Eyes: Conjunctiva clear,   MS: Left elbow tender lateral aspect.  Worse with resisted pronation and supination.  150 degrees extension.  Full flexion.  No swelling or bruising. cap refill intact,  radial pulse intact  Neuro: Alert and oriented - normal speech.      Elin Burgess PA-C

## 2025-01-13 ENCOUNTER — OFFICE VISIT (OUTPATIENT)
Dept: ORTHOPEDICS | Facility: CLINIC | Age: 55
End: 2025-01-13
Attending: PHYSICIAN ASSISTANT
Payer: COMMERCIAL

## 2025-01-13 DIAGNOSIS — M25.522 LEFT ELBOW PAIN: ICD-10-CM

## 2025-01-13 DIAGNOSIS — M77.12 LATERAL EPICONDYLITIS OF LEFT ELBOW: Primary | ICD-10-CM

## 2025-01-13 PROCEDURE — 99204 OFFICE O/P NEW MOD 45 MIN: CPT | Performed by: PEDIATRICS

## 2025-01-13 RX ORDER — NAPROXEN 500 MG/1
500 TABLET ORAL 2 TIMES DAILY
Qty: 30 TABLET | Refills: 0 | Status: SHIPPED | OUTPATIENT
Start: 2025-01-13 | End: 2025-01-28

## 2025-01-13 NOTE — PROGRESS NOTES
ASSESSMENT & PLAN    Radha was seen today for pain.    Diagnoses and all orders for this visit:    Lateral epicondylitis of left elbow  -     naproxen (NAPROSYN) 500 MG tablet; Take 1 tablet (500 mg) by mouth 2 times daily for 15 days.  -     Occupational Therapy  Referral; Future  -     Wrist/Arm/Hand Bracing Supplies Order Wrist Brace; Left; non-thumb spica    Left elbow pain  -     Orthopedic  Referral  -     naproxen (NAPROSYN) 500 MG tablet; Take 1 tablet (500 mg) by mouth 2 times daily for 15 days.  -     Occupational Therapy  Referral; Future  -     Wrist/Arm/Hand Bracing Supplies Order Wrist Brace; Left; non-thumb spica      This issue is acute and Unchanged.        ICD-10-CM    1. Lateral epicondylitis of left elbow  M77.12 naproxen (NAPROSYN) 500 MG tablet     Occupational Therapy  Referral     Wrist/Arm/Hand Bracing Supplies Order Wrist Brace; Left; non-thumb spica      2. Left elbow pain  M25.522 Orthopedic  Referral     naproxen (NAPROSYN) 500 MG tablet     Occupational Therapy  Referral     Wrist/Arm/Hand Bracing Supplies Order Wrist Brace; Left; non-thumb spica          Most symptoms consistent with lateral epicondylitis, slightly atypical that extension is decreased. Discussed supportive care, OT and close follow up if not improving.    Discussed the causes and treatment of lateral epicondylitis including ice, heat, stretching, massage, over the counter anti-inflammatory medications, elbow strap, and wrist brace use. Discussed the importance of eccentric strengthening - home exercise program or hand therapy appointment.  Rest as possible from activities that cause pain.    Plan:  - Today's Plan of Care:  Start Home Exercise Program and referral Rehab: Occupational Therapy: [facility] - [phone]    Trial of wrist brace  Naproxen (I reviewed the mechanism of action as well as risk/benefit profile of medications. Questions  answered.)    Discussed activity considerations and other supportive care including Ice/Heat, OTC and other topical medications as needed.    -We also discussed other future treatment options:  MRI if not improving    Follow Up: 4-6 weeks  Can continue to follow up with me for further treatment recommendations    Concerning signs and symptoms were reviewed and all questions were answered at this time.    Anne Aburto MD [facility]  Sports Medicine Physician  [facility]    -----  Chief Complaint   Patient presents with    Left Elbow - Pain       SUBJECTIVE  Radha Crockett is a/an 54 year old female who is seen as an Urgent Care referral for evaluation of left elbow pain.     The patient is seen by themselves.  The patient is Right handed    Onset: 1 month(s) ago. Reports insidious onset without acute precipitating event. Does a lot of lifting, has 6 large dogs.   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      REVIEW OF SYSTEMS:  Review of Systems    OBJECTIVE:  LMP 12/22/2024 (Exact Date)    General: healthy, alert and in no distress  Skin: no suspicious lesions or rash.  CV: distal perfusion intact   Resp: normal respiratory effort without conversational dyspnea   Psych: normal mood and affect  Gait: NORMAL  Neuro: Normal light sensory exam of upper extremity    Left Elbow exam:    Inspection:     no ecchymosis       no edema or effusion    Tender:     lateral epicondyle left    Non-Tender:      remainder of the elbow bilaterally    ROM:      flexion 120 left       extension 5 left with pain       forearm supination 90 left        forearm pronation 90 left    Strength:     flexion 5/5 bilateral       extension 5/5 bilateral       forearm supination 5/5 bilateral       forearm pronation 5/5 bilateral  - pain with resisted wrist extension left      Sensation:     intact throughout hand       intact throughout forearm       RADIOLOGY:  Final results and radiologist's interpretation, available in the [facility] health record.  Images were reviewed with the patient in the office today.  My personal interpretation of the performed imaging:     3 XR views of left elbow 1/7/2025 reviewed: no acute bony abnormality, no significant degenerative change  - will follow official read    Results for orders placed or performed in visit on 01/07/25   XR Elbow Left G/E 3 Views    Narrative    EXAM: XR ELBOW LEFT G/E 3 VIEWS  LOCATION: [facility]  DATE: 1/7/2025    INDICATION: x 1month, can't fully straighten, hurts lateral aspect. No obvious injury.  COMPARISON: None.      Impression    IMPRESSION: Normal joint spaces and alignment. No acute displaced left elbow fracture or sizable joint effusion.     Reviewed UC notes  Review of the result(s) of each unique test - XR

## 2025-01-13 NOTE — LETTER
1/13/2025      Radha Crockett  [address]  [place] [address]      Dear Colleague,    Thank you for referring your patient, Radha Crockett, to the [facility] [place]. Please see a copy of my visit note below.    ASSESSMENT & PLAN    Radha was seen today for pain.    Diagnoses and all orders for this visit:    Lateral epicondylitis of left elbow  -     naproxen (NAPROSYN) 500 MG tablet; Take 1 tablet (500 mg) by mouth 2 times daily for 15 days.  -     Occupational Therapy  Referral; Future  -     Wrist/Arm/Hand Bracing Supplies Order Wrist Brace; Left; non-thumb spica    Left elbow pain  -     Orthopedic  Referral  -     naproxen (NAPROSYN) 500 MG tablet; Take 1 tablet (500 mg) by mouth 2 times daily for 15 days.  -     Occupational Therapy  Referral; Future  -     Wrist/Arm/Hand Bracing Supplies Order Wrist Brace; Left; non-thumb spica      This issue is acute and Unchanged.        ICD-10-CM    1. Lateral epicondylitis of left elbow  M77.12 naproxen (NAPROSYN) 500 MG tablet     Occupational Therapy  Referral     Wrist/Arm/Hand Bracing Supplies Order Wrist Brace; Left; non-thumb spica      2. Left elbow pain  M25.522 Orthopedic  Referral     naproxen (NAPROSYN) 500 MG tablet     Occupational Therapy  Referral     Wrist/Arm/Hand Bracing Supplies Order Wrist Brace; Left; non-thumb spica          Most symptoms consistent with lateral epicondylitis, slightly atypical that extension is decreased. Discussed supportive care, OT and close follow up if not improving.    Discussed the causes and treatment of lateral epicondylitis including ice, heat, stretching, massage, over the counter anti-inflammatory medications, elbow strap, and wrist brace use. Discussed the importance of eccentric strengthening - home exercise program or hand therapy appointment.  Rest as possible from activities that cause pain.    Plan:  - Today's Plan of  Care:  Start Home Exercise Program and referral Rehab: Occupational Therapy: [facility] - [phone]    Trial of wrist brace  Naproxen (I reviewed the mechanism of action as well as risk/benefit profile of medications. Questions answered.)    Discussed activity considerations and other supportive care including Ice/Heat, OTC and other topical medications as needed.    -We also discussed other future treatment options:  MRI if not improving    Follow Up: 4-6 weeks  Can continue to follow up with me for further treatment recommendations    Concerning signs and symptoms were reviewed and all questions were answered at this time.    Anne Aburto MD [facility]  Sports Medicine Physician  [facility]    -----  Chief Complaint   Patient presents with     Left Elbow - Pain       SUBJECTIVE  Radha Crockett is a/an 54 year old female who is seen as an Urgent Care referral for evaluation of left elbow pain.     The patient is seen by themselves.  The patient is Right handed    Onset: 1 month(s) ago. Reports insidious onset without acute precipitating event. Does a lot of lifting, has 6 large dogs.   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      REVIEW OF SYSTEMS:  Review of Systems    OBJECTIVE:  [facility] 12/22/2024 (Exact Date)    General: healthy, alert and in no distress  Skin: no suspicious lesions or rash.  CV: distal perfusion intact   Resp: normal respiratory effort without conversational dyspnea   Psych: normal mood and affect  Gait: NORMAL  Neuro: Normal light sensory exam of upper extremity    Left Elbow exam:    Inspection:      no ecchymosis       no edema or effusion    Tender:     lateral epicondyle left    Non-Tender:      remainder of the elbow bilaterally    ROM:      flexion 120 left       extension 5 left with pain       forearm supination 90 left       forearm pronation 90 left    Strength:     flexion 5/5 bilateral       extension 5/5 bilateral       forearm supination 5/5 bilateral       forearm pronation 5/5 bilateral  - pain with resisted wrist extension left      Sensation:     intact throughout hand       intact throughout forearm       RADIOLOGY:  Final results and radiologist's interpretation, available in the [facility] health record.  Images were reviewed with the patient in the office today.  My personal interpretation of the performed imaging:     3 XR views of left elbow 1/7/2025 reviewed: no acute bony abnormality, no significant degenerative change  - will follow official read    Results for orders placed or performed in visit on 01/07/25   XR Elbow Left G/E 3 Views    Narrative    EXAM: XR ELBOW LEFT G/E 3 VIEWS  LOCATION: [facility]  DATE: 1/7/2025    INDICATION: x 1month, can't fully straighten, hurts lateral aspect. No obvious injury.  COMPARISON: None.      Impression    IMPRESSION: Normal joint spaces and alignment. No acute displaced left elbow fracture or sizable joint effusion.     Reviewed UC notes  Review of the result(s) of each unique test - XR             Again, thank you for allowing me to participate in the care of your patient.        Sincerely,        Anne Aburto MD    Electronically signed

## 2025-01-13 NOTE — PATIENT INSTRUCTIONS
Most symptoms consistent with lateral epicondylitis, slightly atypical that extension is decreased. Discussed supportive care, OT and close follow up if not improving.    Discussed the causes and treatment of lateral epicondylitis including ice, heat, stretching, massage, over the counter anti-inflammatory medications, elbow strap, and wrist brace use. Discussed the importance of eccentric strengthening - home exercise program or hand therapy appointment.  Rest as possible from activities that cause pain.    Plan:  - Today's Plan of Care:  Start Home Exercise Program and referral Rehab: Occupational Therapy: [facility] - [phone]    Trial of wrist brace  Naproxen (I reviewed the mechanism of action as well as risk/benefit profile of medications. Questions answered.)    Discussed activity considerations and other supportive care including Ice/Heat, OTC and other topical medications as needed.    -We also discussed other future treatment options:  MRI if not improving    Follow Up: 4-6 weeks  Can continue to follow up with me for further treatment recommendations    If you have any further questions for your physician or physician s care team you can call [phone].

## 2025-01-15 ENCOUNTER — TELEPHONE (OUTPATIENT)
Dept: URGENT CARE | Facility: URGENT CARE | Age: 55
End: 2025-01-15
Payer: COMMERCIAL

## 2025-01-15 NOTE — TELEPHONE ENCOUNTER
Medication Question or Refill        What medication are you calling about (include dose and sig)?: 1 % Magdalena     Preferred Pharmacy:   [id number] - [place], [place] - [address]  [address]  [place] [address]  Phone: [phone] Fax: [phone]      Controlled Substance Agreement on file:   CSA -- Patient Level:    CSA: None found at the patient level.       Who prescribed the medication?: Elin Burgess    Do you need a refill? Yes, Pharmacy never received prescrition    When did you use the medication last?      Patient offered an appointment? No    Do you have any questions or concerns?  Yes: Needs prescription from 1/7/25  visit to be sent to pharmacy      Could we send this information to you in [facility] or would you prefer to receive a phone call?:   Patient would prefer a phone call   Okay to leave a detailed message?: Yes at Cell number on file:    Telephone Information:   Mobile [phone]

## 2025-01-17 NOTE — TELEPHONE ENCOUNTER
Saw that patient is OK with detailed message - left message informing patient that UC provider will not fill Voltaren gel due to it being similar to medication prescribed by ortho yesterday. Informed patient that she should reach out to ortho to see what they would recommend.    Angus Jones LPN

## 2025-01-21 ENCOUNTER — OFFICE VISIT (OUTPATIENT)
Dept: FAMILY MEDICINE | Facility: CLINIC | Age: 55
End: 2025-01-21
Payer: COMMERCIAL

## 2025-01-21 VITALS
HEART RATE: 98 BPM | WEIGHT: 220 LBS | BODY MASS INDEX: 36.65 KG/M2 | TEMPERATURE: 97.7 F | HEIGHT: 65 IN | RESPIRATION RATE: 18 BRPM | OXYGEN SATURATION: 98 % | DIASTOLIC BLOOD PRESSURE: 85 MMHG | SYSTOLIC BLOOD PRESSURE: 138 MMHG

## 2025-01-21 DIAGNOSIS — E34.9 HORMONAL DISORDER: ICD-10-CM

## 2025-01-21 DIAGNOSIS — R03.0 ELEVATED BP WITHOUT DIAGNOSIS OF HYPERTENSION: ICD-10-CM

## 2025-01-21 DIAGNOSIS — R10.13 EPIGASTRIC PAIN: ICD-10-CM

## 2025-01-21 DIAGNOSIS — M77.12 LATERAL EPICONDYLITIS OF LEFT ELBOW: Primary | ICD-10-CM

## 2025-01-21 DIAGNOSIS — E66.812 CLASS 2 OBESITY WITHOUT SERIOUS COMORBIDITY WITH BODY MASS INDEX (BMI) OF 36.0 TO 36.9 IN ADULT, UNSPECIFIED OBESITY TYPE: ICD-10-CM

## 2025-01-21 PROCEDURE — 99214 OFFICE O/P EST MOD 30 MIN: CPT | Performed by: FAMILY MEDICINE

## 2025-01-21 ASSESSMENT — PAIN SCALES - GENERAL: PAINLEVEL_OUTOF10: NO PAIN (0)

## 2025-01-21 NOTE — PROGRESS NOTES
"  Assessment & Plan     Lateral epicondylitis of left elbow   Pain consistent with lateral epicondylitis; always busy with chores at home, discussed activity modification, improving at this time.     Elevated BP without diagnosis of hypertension   - Has had high BP readings on and off; at one point was started on a medication (propranolol 60 mg), but stopped a while ago. Today reading in office one reading was normal and another high, discussed monitoring at home and sending in some readings to compare; then will consider whether to treat. She also reports a hectic life (busy throughout the day) at home, discussed stress reduction    Class 2 obesity without serious comorbidity with body mass index (BMI) of 36.0 to 36.9 in adult, unspecified obesity type   - very bothered by her weight; had taken GLP1 before and was working well but was too expensive; working on getting medication from [place]. Will do prescription when ready.    Epigastric pain   - Had episodes of sudden epigastric pain, symptoms consistent with gallstones or could be GERD, discussed evaluation with US; will wait till pain recurs    Hormonal Concern    Still have regular period; discussed this could be normal and her hot flush like symptoms could be perimenopausal. Has seen OB was told was normal.        BMI  Estimated body mass index is 36.22 kg/m  as calculated from the following:    Height as of this encounter: 1.66 m (5' 5.35\").    Weight as of this encounter: 99.8 kg (220 lb).   Weight management plan: Discussed healthy diet and exercise guidelines Looking into affordable GLP1 type of medication.      See Patient Instructions    Julius Garcia is a 54 year old, presenting for the following health issues:  Hypertension    Lt Elbow Pain (lateral epicondylitis)  Cannot stretch straight  Seen in UC for elbow pain told was tennis elbow. She is very active at home and does everything in there home.  Likely overuse    Elevated BP   She was " "given a BP medication she took for a short time  She was on a GLP1 and was helping but is too expensive; has a friend who she says can get it for her from out of state but requires a prescription; will confirm details.  BP Readings from Last 6 Encounters:   01/21/25 138/85   01/07/25 (!) 161/90   08/10/24 (!) 144/90   07/31/24 134/88   03/08/24 (!) 150/92   09/20/23 135/82   150/92    Epigastric pain   - severe pain, continuous cramping with fire under the breast   ? Could be Gall stones or GERD; given age and weight      Weight:   Lost weight on Ozempic; will want to proceed with 0.5 mg    Hormone concerns    She still gets her period regularly, thinks should be done with periods by now     Also having hot flush like symptoms   Been evaluate by OB, and was told that could be normal.      1/21/2025     1:18 PM   Additional Questions   Roomed by anjum santos   Accompanied by self     History of Present Illness       Hypertension: She presents for follow up of hypertension.  She does not check blood pressure  regularly outside of the clinic. Outside blood pressures have been over 140/90. She does not follow a low salt diet.     She eats 2-3 servings of fruits and vegetables daily.She consumes 1 sweetened beverage(s) daily.She exercises with enough effort to increase her heart rate 10 to 19 minutes per day.  She exercises with enough effort to increase her heart rate 3 or less days per week.   She is taking medications regularly.     ED/UC Followup:  Facility:  [facility]   Date of visit: 1/7/2025  Reason for visit: Left elbow pain   Current Status: improving        Objective    /85   Pulse 98   Temp 97.7  F (36.5  C) (Temporal)   Resp 18   Ht 1.66 m (5' 5.35\")   Wt 99.8 kg (220 lb)   LMP 01/15/2025 (Approximate)   SpO2 98%   BMI 36.22 kg/m    Body mass index is 36.22 kg/m .  Physical Exam   GENERAL: alert and no distress  RESP: lungs clear to auscultation - no rales, rhonchi or wheezes  CV: regular " rate and rhythm, no murmur, click or rub, no peripheral edema  MS: no gross musculoskeletal defects noted, no edema  NEURO: Normal strength and tone, mentation intact and speech normal  PSYCH: mentation appears normal, affect normal/bright        Signed Electronically by: Azam Brito MD

## 2025-01-27 ENCOUNTER — TELEPHONE (OUTPATIENT)
Dept: FAMILY MEDICINE | Facility: CLINIC | Age: 55
End: 2025-01-27
Payer: COMMERCIAL

## 2025-01-27 NOTE — TELEPHONE ENCOUNTER
Patient Quality Outreach    Patient is due for the following:   Colon Cancer Screening  Physical Preventive Adult Physical    Action(s) Taken:   Schedule a Adult Preventative    Type of outreach:    Sent [employer] message.    Questions for provider review:    None           CRISTINA RIVAS, CMA

## 2025-02-16 NOTE — TELEPHONE ENCOUNTER
Patient states she was seen by a nurse practitioner and advised to see neurology.  Patient was transferred from scheduling because patient wanted appointment with neurologist, but needs a referral.   I reviewed with patient the dictation from visit on 01-05-18:  ASSESSMENT/PLAN:          ICD-10-CM     1. Acute non intractable tension-type headache G44.209 traMADol (ULTRAM) 50 MG tablet       DISCONTINUED: traMADol (ULTRAM) 50 MG tablet      I recommend follow up with PCP in 3 days or sooner if symptoms are getting worse  Side effects of medications discussed  Over the counter medications discussed  All questions are answered and patient is in agreement with treatment plan  Liliane Rivera  [facility]  Family Nurse Practitoner    Patient agrees to make appointment with PCP, as advised. Patient refused triage at this time.   
Yes

## 2025-03-10 ENCOUNTER — NURSE TRIAGE (OUTPATIENT)
Dept: FAMILY MEDICINE | Facility: CLINIC | Age: 55
End: 2025-03-10
Payer: COMMERCIAL

## 2025-03-10 DIAGNOSIS — I10 HTN, GOAL BELOW 140/90: Primary | ICD-10-CM

## 2025-03-11 NOTE — TELEPHONE ENCOUNTER
Nurse Triage SBAR    Is this a 2nd Level Triage? YES, LICENSED PRACTITIONER REVIEW IS REQUIRED    Situation: Spoke with patient. Patient explains she is still experiencing headaches and chest pain with her elevated BP readings.    Background: Patient's systolic BP readings have been between 145-165 for the last few weeks. She has also been experiencing headaches and chest pain for the past few weeks. Patient stating she has some ulcers under her chest area, therefore, unsure if it is related to ulcers. Patient's headaches are constant, at the top of her head.     Patient explains that she has been seen in the emergency department a couple of times, and each ED provider has suggested that she follow-up with PCP regarding being prescribed BP medication.    Assessment: RN advised patient should be seen in ED immediately due to symptoms with elevated BP. Patient declined, stating she has discussed this in the past with PCP, and has been to ED multiple times.    Patient notes that she was last seen on 1/21 for HTN, where she discussed she was prescribed Propranolol 60 mg in the past.    Patient requesting message to be sent to PCP to further advise.     Protocol Recommended Disposition:   Go to ED Now    Recommendation: RN emphasized importance of being seen in ED once again, patient still requesting message to be sent to PCP to further advise.     Routed to provider    Does the patient meet one of the following criteria for ADS visit consideration? 16+ years old, with an [facility] PCP     TIP  Providers, please consider if this condition is appropriate for management at one of our Acute and Diagnostic Services sites.     If patient is a good candidate, please use dotphrase <dot>triageresponse and select Refer to ADS to document.    ALYSA Medley RN  [facility]    Reason for Disposition   Systolic BP >= 160 OR Diastolic >= 100, and any cardiac (e.g., breathing difficulty, chest pain) or neurologic  symptoms (e.g., new-onset blurred or double vision)    Additional Information   Negative: Sounds like a life-threatening emergency to the triager   Negative: Symptom is main concern (e.g., headache, chest pain)   Negative: Low blood pressure is main concern    Protocols used: Blood Pressure - High-A-OH

## 2025-03-11 NOTE — TELEPHONE ENCOUNTER
Pt asking for prescription for propranolol.    Called patient. Relayed message from Dr. Brito. Pt got upset and started crying. States that she does not understand why he won't prescribe propranolol for her for high blood pressure. Pt stating that she is having these symptoms because she isn't getting a prescription for propranolol. Notified her that writer will send message to Dr. Brito to ask about getting prescription for propranolol, however recommendations are to go to ER for her symptoms this is to rule out any serious or life threatening conditions due to her symptoms. Pt replied that she has been having symptoms for two weeks now and if she was going to die she would be dead by now. Reinforced recommendations for ER and that this is the medical advise being given, she has the choice to decline this suggestion or choose not to go.    Capri Dexter RN

## 2025-03-12 RX ORDER — PROPRANOLOL HYDROCHLORIDE 60 MG/1
60 CAPSULE, EXTENDED RELEASE ORAL DAILY
Qty: 30 CAPSULE | Refills: 1 | Status: SHIPPED | OUTPATIENT
Start: 2025-03-12

## 2025-03-12 NOTE — TELEPHONE ENCOUNTER
I have sent a prescription for propranolol to her pharmacy but explain to her that having headache and chest pain in the face of high BP is something we take seriously and if still having the symptoms she still needs to be evaluated to rule out anything sinister

## 2025-03-13 NOTE — TELEPHONE ENCOUNTER
"Note that pt was seen in ED 3/12/25.    \"  Discharge Instructions   Mat Lott MD - 03/12/2025 3:15 PM CDT     Start Propranolol per home dosage    Take Ibuprofen 600 mg orally every 6 hours as needed    Return with worsening symptoms    Follow up with primary MD within next 1-2 weeks  \"    Sent [email or web address] message to patient.    Capri Dexter RN   "

## 2025-03-13 NOTE — TELEPHONE ENCOUNTER
Noted that previous nurse left a detailed message on patient's VM with PCP's note.   Sent mychart message with provider's recommendations as well    Felix Tobin RN  [facility]

## 2025-04-01 ENCOUNTER — NURSE TRIAGE (OUTPATIENT)
Dept: FAMILY MEDICINE | Facility: CLINIC | Age: 55
End: 2025-04-01

## 2025-04-01 ENCOUNTER — OFFICE VISIT (OUTPATIENT)
Dept: FAMILY MEDICINE | Facility: CLINIC | Age: 55
End: 2025-04-01
Payer: COMMERCIAL

## 2025-04-01 ENCOUNTER — ALLIED HEALTH/NURSE VISIT (OUTPATIENT)
Dept: FAMILY MEDICINE | Facility: CLINIC | Age: 55
End: 2025-04-01
Payer: COMMERCIAL

## 2025-04-01 VITALS
DIASTOLIC BLOOD PRESSURE: 88 MMHG | HEIGHT: 65 IN | TEMPERATURE: 97.9 F | RESPIRATION RATE: 18 BRPM | WEIGHT: 220 LBS | HEART RATE: 72 BPM | SYSTOLIC BLOOD PRESSURE: 153 MMHG | BODY MASS INDEX: 36.65 KG/M2 | OXYGEN SATURATION: 98 %

## 2025-04-01 DIAGNOSIS — R51.9 HEADACHE DISORDER: ICD-10-CM

## 2025-04-01 DIAGNOSIS — I10 HTN (HYPERTENSION): Primary | ICD-10-CM

## 2025-04-01 DIAGNOSIS — R42 DIZZINESS: ICD-10-CM

## 2025-04-01 DIAGNOSIS — E78.5 HYPERLIPIDEMIA LDL GOAL <100: ICD-10-CM

## 2025-04-01 DIAGNOSIS — M77.8 WRIST TENDONITIS: ICD-10-CM

## 2025-04-01 DIAGNOSIS — I10 HTN, GOAL BELOW 140/90: Primary | ICD-10-CM

## 2025-04-01 PROCEDURE — 99214 OFFICE O/P EST MOD 30 MIN: CPT | Performed by: FAMILY MEDICINE

## 2025-04-01 PROCEDURE — 99207 PR NO CHARGE NURSE ONLY: CPT

## 2025-04-01 PROCEDURE — 1126F AMNT PAIN NOTED NONE PRSNT: CPT | Performed by: FAMILY MEDICINE

## 2025-04-01 PROCEDURE — 80061 LIPID PANEL: CPT | Performed by: FAMILY MEDICINE

## 2025-04-01 PROCEDURE — 3077F SYST BP >= 140 MM HG: CPT | Performed by: FAMILY MEDICINE

## 2025-04-01 PROCEDURE — 3079F DIAST BP 80-89 MM HG: CPT | Performed by: FAMILY MEDICINE

## 2025-04-01 PROCEDURE — 36415 COLL VENOUS BLD VENIPUNCTURE: CPT | Performed by: FAMILY MEDICINE

## 2025-04-01 RX ORDER — ATENOLOL AND CHLORTHALIDONE TABLET 50; 25 MG/1; MG/1
1 TABLET ORAL DAILY
Qty: 30 TABLET | Refills: 2 | Status: SHIPPED | OUTPATIENT
Start: 2025-04-01

## 2025-04-01 ASSESSMENT — PAIN SCALES - GENERAL: PAINLEVEL_OUTOF10: NO PAIN (0)

## 2025-04-01 ASSESSMENT — ENCOUNTER SYMPTOMS: HYPERTENSION: 1

## 2025-04-01 NOTE — PROGRESS NOTES
Assessment & Plan     HTN, goal below 140/90  Persistent;ly elevated BP, with secondary factors. Reviewed ER records including work up. Discussed factors that affect BP, lifestyle modification and switching to a different medication; will do Atenolol with chlorthalidone and reassess in 1 week; also reassured that this is manageable.    - atenolol-chlorthalidone (TENORETIC) 50-25 MG tablet  Dispense: 30 tablet; Refill: 2    Headache   - Patient distressed, reports having too many responsibilities at home and work as well.  Discussed trying to slow down her pace    Dizziness    - could be related to BP and stress; will control BP and see if this helps    Hyperlipidemia LDL goal <100   Due for cholesterol check  - Lipid Profile (Chol, Trig, HDL, LDL calc)  - Lipid Profile (Chol, Trig, HDL, LDL calc)    Wrist tendinosis Lt with cyst:     - will consider evaluation by ortho if persist    See Patient Instructions    Julius Garcia is a 54 year old, presenting for the following health issues:  Hypertension: BP been very high.  Dizziness/Lightheadedness:  Chest Pain: something heavy sitting on her chest.    Has also been having some difficulties with breathing.  Been taking Propranolol 60 mg daily    Stress: A lot of responsibilities    Labs from ER 3/21 and 3/29/2025:    BASIC METABOLIC PANEL - Abnormal; Notable for the following components:   Result Value   POTASSIUM 5.7 (*)   GLUCOSE 114 (*)   CREATININE 0.98 (*)   eGFR 69 (*)   All other components within normal limits   HEPATIC FUNCTION PANEL - Abnormal; Notable for the following components:   ALK PHOSPHATASE 105 (*)   All other components within normal limits   CBC WITH AUTO DIFFERENTIAL - Abnormal; Notable for the following components:   RED BLOOD COUNT 5.28 (*)   ABSOLUTE LYMPHOCYTES 3.2 (*)   All other components within normal limits   TROPONIN T (HS) ACUTE W/2HR REFLEX - Normal     EKG: 3/12/25:   Normal sinus rhythm  Normal ECG  When compared with ECG of  "26-Aug-2020 19:08,  PA interval has decreased  Nonspecific T wave abnormality no longer evident in Inferior leads    Ventricular Rate 84   P-R Interval 178   QRS Duration 90      QTc 425   R Axis 12     Left Wristt: cyst      4/1/2025    11:37 AM   Additional Questions   Roomed by Livia     Hypertension     History of Present Illness       Reason for visit:  High blood pressure lighheadedness  Symptom onset:  More than a month  Symptoms include:  Lightheaded, chest pain dizziness  Symptom intensity:  Moderate  Symptom progression:  Staying the same  Had these symptoms before:  No   She is taking medications regularly.      Review of Systems  Constitutional, HEENT, cardiovascular, pulmonary, gi and gu systems are negative, except as otherwise noted.      Objective    BP (!) 156/100   Pulse 72   Temp 97.9  F (36.6  C) (Temporal)   Resp 18   Ht 1.66 m (5' 5.35\")   Wt 99.8 kg (220 lb)   LMP 03/24/2025   SpO2 98%   BMI 36.21 kg/m    Body mass index is 36.21 kg/m .  Physical Exam   GENERAL: alert, anxious,emotionally distressed and teary  RESP: lungs clear to auscultation - no rales, rhonchi or wheezes  CV: regular rate and rhythm,  no murmur, click or rub, no peripheral edema  MS: no gross musculoskeletal defects noted, no edema  NEURO: Normal strength and tone, mentation intact and speech normal  PSYCH: mentation appears normal, affect anxious and worried    Signed Electronically by: Azam Brito MD    "

## 2025-04-01 NOTE — TELEPHONE ENCOUNTER
Reason for Disposition    Patient wants to be seen    Additional Information    Negative: Symptom is main concern (e.g., headache, chest pain)    Negative: Low blood pressure is main concern    Negative: Sounds like a life-threatening emergency to the triager    Negative: Systolic BP >= 160 OR Diastolic >= 100, and any cardiac (e.g., breathing difficulty, chest pain) or neurologic symptoms (e.g., new-onset blurred or double vision)    Negative: Pregnant 20 or more weeks (or postpartum < 6 weeks) and Systolic BP >= 160 OR Diastolic >= 110    Negative: Patient sounds very sick or weak to the triager    Negative: Systolic BP >= 200 OR Diastolic >= 120 and having NO cardiac or neurologic symptoms    Negative: Pregnant 20 or more weeks (or postpartum < 6 weeks) with Systolic BP >= 140 OR Diastolic >= 90    Negative: Systolic BP >= 180 OR Diastolic >= 110, and missed most recent dose of blood pressure medication    Negative: Systolic BP >= 180 OR Diastolic >= 110    Protocols used: Blood Pressure - High-A-OH

## 2025-04-01 NOTE — TELEPHONE ENCOUNTER
"Patient walked into clinic expecting to be seen today; stated she had an appt with Dr. Agudelo but canceled it last night. She has high BP (160/100 home report).     Patient current vital signs:    156/100  HR 71  O2 98%    Prescribed Propranolol - takes in the evening normally, last dose 11pm yesterday. No current CP, SOB, severe HA, or dizziness. Only slight headache and patient appears very worked up, begging writer to speak with Dr. Brito to get medication ordered immediately.    Huddled with Dr. Brito who agreed to place patient on his schedule today. Spoke with MA who roomed patient.    Answer Assessment - Initial Assessment Questions  1. BLOOD PRESSURE: \"What is your blood pressure?\" \"Did you take at least two measurements 5 minutes apart?\"      156/100  2. ONSET: \"When did you take your blood pressure?\"      Current   3. HOW: \"How did you take your blood pressure?\" (e.g., automatic home BP monitor, visiting nurse)      Automatic home BP monitor   4. HISTORY: \"Do you have a history of high blood pressure?\"      In ER 2 weeks ago, and on Saturday as well   5. MEDICINES: \"Are you taking any medicines for blood pressure?\" \"Have you missed any doses recently?\"      Propranolol - did not help at all   6. OTHER SYMPTOMS: \"Do you have any symptoms?\" (e.g., blurred vision, chest pain, difficulty breathing, headache, weakness)      On Saturday, floaters in R eye, tired   7. PREGNANCY: \"Is there any chance you are pregnant?\" \"When was your last menstrual period?\"      No    Protocols used: Blood Pressure - High-A-OH    ALYSA Quijano RN  [facility], [facility]  "

## 2025-04-01 NOTE — PROGRESS NOTES
Radha Crockett is a 54 y.o. female who presents to the emergency department for evaluation of high blood pressure and dizziness. The patient reports waking up today at 0730 with right eye vision change, seeing three black dots. Blood pressure was 200/100 at home. Sh states one month of daily dizziness, nausea, chest pain, burning pain on top of the head, and average blood pressure of 160s/90s. Pain fluctuates daily. Difficulty ambulating due to shortness of breath and weakness. She additionally noted one month of left sided abdominal pain and ongoing bilateral foot edema with the right being worse for the past seven years. She has had increased stress the past year and noted of 80 lbs of weight gain over the span of 15 years. Patient has been seen for her blood pressure before. Last visit she was given propanolol and noted no relief. She has an appointment set to see her primary care physician on 04/01. She noted history of prediabetes, hypertension, and family history of heart attack before 63 years old for her father and heart problems for her mother. She denies any burning with voiding, other urinary symptoms, and history of lung problems.     BP Readings from Last 6 Encounters:   01/24/25 (!) 164/88   01/21/25 138/85   01/07/25 (!) 161/90   08/10/24 (!) 144/90   07/31/24 134/88   03/08/24 (!) 150/92

## 2025-04-02 LAB
CHOLEST SERPL-MCNC: 256 MG/DL
FASTING STATUS PATIENT QL REPORTED: NO
HDLC SERPL-MCNC: 46 MG/DL
LDLC SERPL CALC-MCNC: 181 MG/DL
NONHDLC SERPL-MCNC: 210 MG/DL
TRIGL SERPL-MCNC: 146 MG/DL

## 2025-04-14 ENCOUNTER — OFFICE VISIT (OUTPATIENT)
Dept: FAMILY MEDICINE | Facility: CLINIC | Age: 55
End: 2025-04-14
Payer: COMMERCIAL

## 2025-04-14 ENCOUNTER — ANCILLARY PROCEDURE (OUTPATIENT)
Dept: GENERAL RADIOLOGY | Facility: CLINIC | Age: 55
End: 2025-04-14
Attending: FAMILY MEDICINE
Payer: COMMERCIAL

## 2025-04-14 VITALS
TEMPERATURE: 98 F | HEART RATE: 76 BPM | WEIGHT: 220 LBS | SYSTOLIC BLOOD PRESSURE: 126 MMHG | HEIGHT: 65 IN | BODY MASS INDEX: 36.65 KG/M2 | OXYGEN SATURATION: 96 % | RESPIRATION RATE: 16 BRPM | DIASTOLIC BLOOD PRESSURE: 83 MMHG

## 2025-04-14 DIAGNOSIS — F41.1 GAD (GENERALIZED ANXIETY DISORDER): ICD-10-CM

## 2025-04-14 DIAGNOSIS — E78.00 HIGH CHOLESTEROL: ICD-10-CM

## 2025-04-14 DIAGNOSIS — R07.89 ATYPICAL CHEST PAIN: Primary | ICD-10-CM

## 2025-04-14 DIAGNOSIS — S69.91XA INJURY OF FINGER OF RIGHT HAND, INITIAL ENCOUNTER: ICD-10-CM

## 2025-04-14 DIAGNOSIS — E66.812 CLASS 2 SEVERE OBESITY WITH BODY MASS INDEX (BMI) OF 35 TO 39.9 WITH SERIOUS COMORBIDITY (H): ICD-10-CM

## 2025-04-14 DIAGNOSIS — M67.432 GANGLION CYST OF WRIST, LEFT: ICD-10-CM

## 2025-04-14 DIAGNOSIS — E66.01 CLASS 2 SEVERE OBESITY WITH BODY MASS INDEX (BMI) OF 35 TO 39.9 WITH SERIOUS COMORBIDITY (H): ICD-10-CM

## 2025-04-14 DIAGNOSIS — I10 HYPERTENSION GOAL BP (BLOOD PRESSURE) < 140/90: ICD-10-CM

## 2025-04-14 PROBLEM — R42 VERTIGO: Status: RESOLVED | Noted: 2020-08-26 | Resolved: 2025-04-14

## 2025-04-14 PROCEDURE — 36415 COLL VENOUS BLD VENIPUNCTURE: CPT | Performed by: FAMILY MEDICINE

## 2025-04-14 PROCEDURE — 86140 C-REACTIVE PROTEIN: CPT | Performed by: FAMILY MEDICINE

## 2025-04-14 PROCEDURE — 80061 LIPID PANEL: CPT | Performed by: FAMILY MEDICINE

## 2025-04-14 PROCEDURE — 84443 ASSAY THYROID STIM HORMONE: CPT | Performed by: FAMILY MEDICINE

## 2025-04-14 PROCEDURE — 3074F SYST BP LT 130 MM HG: CPT | Performed by: FAMILY MEDICINE

## 2025-04-14 PROCEDURE — 3079F DIAST BP 80-89 MM HG: CPT | Performed by: FAMILY MEDICINE

## 2025-04-14 PROCEDURE — 73140 X-RAY EXAM OF FINGER(S): CPT | Mod: TC | Performed by: STUDENT IN AN ORGANIZED HEALTH CARE EDUCATION/TRAINING PROGRAM

## 2025-04-14 PROCEDURE — 80053 COMPREHEN METABOLIC PANEL: CPT | Performed by: FAMILY MEDICINE

## 2025-04-14 PROCEDURE — G2211 COMPLEX E/M VISIT ADD ON: HCPCS | Performed by: FAMILY MEDICINE

## 2025-04-14 PROCEDURE — 99214 OFFICE O/P EST MOD 30 MIN: CPT | Performed by: FAMILY MEDICINE

## 2025-04-14 RX ORDER — ATENOLOL AND CHLORTHALIDONE TABLET 100; 25 MG/1; MG/1
1 TABLET ORAL DAILY
Qty: 90 TABLET | Refills: 4 | Status: SHIPPED | OUTPATIENT
Start: 2025-04-14

## 2025-04-14 RX ORDER — AMITRIPTYLINE HYDROCHLORIDE 10 MG/1
10 TABLET ORAL
COMMUNITY
Start: 2024-09-23 | End: 2025-04-14

## 2025-04-14 RX ORDER — SUCRALFATE 1 G/1
1 TABLET ORAL 4 TIMES DAILY PRN
COMMUNITY
Start: 2025-02-10

## 2025-04-14 NOTE — PROGRESS NOTES
Assessment & Plan     Atypical chest pain  Recent ED visit reviewed; Differential diagnoses would include: anxiety, chest wall pain, GERD; pending results, follow-up with PCP   - Lipid panel reflex to direct LDL Non-fasting; Future  - CRP, inflammation; Future  - Echocardiogram Exercise Stress; Future  - TSH with free T4 reflex; Future    Hypertension goal BP (blood pressure) < 140/90  Labile hypertension with likely need for improved control; low salt diet; increase dose of medication; follow-up with PCP   - Comprehensive metabolic panel (BMP + Alb, Alk Phos, ALT, AST, Total. Bili, TP); Future  - atenolol-chlorthalidone (TENORETIC) 100-25 MG tablet; Take 1 tablet by mouth daily.  - TSH with free T4 reflex; Future    High cholesterol  Offered treatment with statin   - Comprehensive metabolic panel (BMP + Alb, Alk Phos, ALT, AST, Total. Bili, TP); Future  - Lipid panel reflex to direct LDL Non-fasting; Future    Class 2 severe obesity with body mass index (BMI) of 35 to 39.9 with serious comorbidity (H)  Associated with hypertension and hypercholesterolemia; Counseled to make better food choices, exercise as tolerated, and lose weight.   - Comprehensive metabolic panel (BMP + Alb, Alk Phos, ALT, AST, Total. Bili, TP); Future  - TSH with free T4 reflex; Future    Ganglion cyst of wrist, left  Orthopedic surgery referral; The patient is reassured that these symptoms do not appear to represent a serious or threatening condition.     Injury of finger of right hand, initial encounter  Call or return to clinic as needed if these symptoms worsen or fail to improve   - XR Finger Right G/E 2 Views; Future    MARCO ANTONIO (generalized anxiety disorder)  Patient prefers to address above before treatment of anxiety   - TSH with free T4 reflex; Future    The longitudinal plan of care for the diagnosis(es)/condition(s) as documented were addressed during this visit. Due to the added complexity in care, I will continue to support Radha in  "the subsequent management and with ongoing continuity of care.        Follow up yearly for preventive visit with PCP         Julius   Radha is a 54 year old, presenting for the following health issues:  Hypertension        4/14/2025     1:53 PM   Additional Questions   Roomed by Jessie GRANADOS CMA   Accompanied by Self         4/14/2025     1:53 PM   Patient Reported Additional Medications   Patient reports taking the following new medications None     History of Present Illness       Reason for visit:  High blood pressure lighheadedness  Symptom onset:  More than a month  Symptoms include:  Lightheaded, chest pain dizziness  Symptom intensity:  Moderate  Symptom progression:  Staying the same  Had these symptoms before:  No   She is taking medications regularly.        Patient also has a few weeks of intermittent fleeting chest pain, non-radiating, with accompanying dyspnea, lightheadedness, dysesthesias of bilateral upper extremities and posterior headache. Denies leg pain or edema.  No recent URI symptoms. Has been evaluated in the ED; records are reviewed.     Hypertension controlled on current medication; she endorses labile hypertension with some readings as high as 200.     She also has a tender lump on her left wrist.     She also has right 3rd ventral finger pain since an injury 4 weeks ago. No deformity or swelling.     She denies that anxiety plays a role in these symptoms.               Objective    /83 (BP Location: Left arm, Patient Position: Sitting, Cuff Size: Adult Regular)   Pulse 76   Temp 98  F (36.7  C) (Oral)   Resp 16   Ht 1.651 m (5' 5\")   Wt 99.8 kg (220 lb)   LMP 03/06/2025   SpO2 96%   BMI 36.61 kg/m    Body mass index is 36.61 kg/m .  Physical Exam   GENERAL: alert and no distress  EYES: Eyes grossly normal to inspection, PERRL and conjunctivae and sclerae normal  HENT: ear canals and TM's normal, nose and mouth without ulcers or lesions  NECK: no adenopathy, no asymmetry, " masses, or scars  RESP: lungs clear to auscultation - no rales, rhonchi or wheezes  CV: regular rate and rhythm, normal S1 S2, no S3 or S4, no murmur, click or rub, no peripheral edema  MS: tender mass under skin of left dorsal radial wrist   SKIN: no suspicious lesions or rashes  NEURO: Normal strength and tone, mentation intact and speech normal  PSYCH: mentation appears normal, affect flat, anxious, judgement and insight intact, and appearance well groomed    Office Visit on 04/01/2025   Component Date Value Ref Range Status    Cholesterol 04/01/2025 256 (H)  <200 mg/dL Final    Triglycerides 04/01/2025 146  <150 mg/dL Final    Direct Measure HDL 04/01/2025 46 (L)  >=50 mg/dL Final    LDL Cholesterol Calculated 04/01/2025 181 (H)  <100 mg/dL Final    Non HDL Cholesterol 04/01/2025 210 (H)  <130 mg/dL Final    Patient Fasting > 8hrs? 04/01/2025 No   Final     No results found for this or any previous visit (from the past 24 hours).        Signed Electronically by: Holly Hernández MD

## 2025-04-15 ENCOUNTER — MYC MEDICAL ADVICE (OUTPATIENT)
Dept: FAMILY MEDICINE | Facility: CLINIC | Age: 55
End: 2025-04-15
Payer: COMMERCIAL

## 2025-04-15 LAB
ALBUMIN SERPL BCG-MCNC: 4.3 G/DL (ref 3.5–5.2)
ALP SERPL-CCNC: 91 U/L (ref 40–150)
ALT SERPL W P-5'-P-CCNC: 29 U/L (ref 0–50)
ANION GAP SERPL CALCULATED.3IONS-SCNC: 12 MMOL/L (ref 7–15)
AST SERPL W P-5'-P-CCNC: 27 U/L (ref 0–45)
BILIRUB SERPL-MCNC: 0.2 MG/DL
BUN SERPL-MCNC: 19.3 MG/DL (ref 6–20)
CALCIUM SERPL-MCNC: 9.9 MG/DL (ref 8.8–10.4)
CHLORIDE SERPL-SCNC: 99 MMOL/L (ref 98–107)
CHOLEST SERPL-MCNC: 208 MG/DL
CREAT SERPL-MCNC: 0.94 MG/DL (ref 0.51–0.95)
CRP SERPL-MCNC: 11.1 MG/L
EGFRCR SERPLBLD CKD-EPI 2021: 72 ML/MIN/1.73M2
FASTING STATUS PATIENT QL REPORTED: YES
FASTING STATUS PATIENT QL REPORTED: YES
GLUCOSE SERPL-MCNC: 105 MG/DL (ref 70–99)
HCO3 SERPL-SCNC: 29 MMOL/L (ref 22–29)
HDLC SERPL-MCNC: 46 MG/DL
LDLC SERPL CALC-MCNC: 122 MG/DL
NONHDLC SERPL-MCNC: 162 MG/DL
POTASSIUM SERPL-SCNC: 4.8 MMOL/L (ref 3.4–5.3)
PROT SERPL-MCNC: 7.4 G/DL (ref 6.4–8.3)
SODIUM SERPL-SCNC: 140 MMOL/L (ref 135–145)
TRIGL SERPL-MCNC: 200 MG/DL
TSH SERPL DL<=0.005 MIU/L-ACNC: 1.1 UIU/ML (ref 0.3–4.2)

## 2025-04-15 NOTE — RESULT ENCOUNTER NOTE
Westley Garcia,    You have high CRP inflammation test and cholesterol. Eat 4 - 5 servings of vegetables and fruits every day. Eat nuts, seeds, and whole grains (such as wheat pasta and wheat bread) instead of chips, fries, crackers and baked goods. Drink water instead of pop and juice. Exercise regularly, with a goal of 150 total minutes a week. Follow-up yearly.      Your thyroid, liver, kidney and blood glucose tests are normal.     Holly Hernández MD

## 2025-04-15 NOTE — RESULT ENCOUNTER NOTE
Westley Garcia,    Your xray is normal; no fractures were seen. Your finger pain may be due to a ligament sprain. Follow-up if you are having difficulty bending or extending the finger. Call or return to clinic as needed if these symptoms worsen or fail to improve as anticipated.     Holly Hernández MD

## 2025-04-23 ENCOUNTER — OFFICE VISIT (OUTPATIENT)
Dept: ORTHOPEDICS | Facility: CLINIC | Age: 55
End: 2025-04-23
Attending: FAMILY MEDICINE
Payer: COMMERCIAL

## 2025-04-23 ENCOUNTER — ANCILLARY PROCEDURE (OUTPATIENT)
Dept: GENERAL RADIOLOGY | Facility: CLINIC | Age: 55
End: 2025-04-23
Attending: STUDENT IN AN ORGANIZED HEALTH CARE EDUCATION/TRAINING PROGRAM
Payer: COMMERCIAL

## 2025-04-23 DIAGNOSIS — M67.432 GANGLION CYST OF WRIST, LEFT: ICD-10-CM

## 2025-04-23 PROCEDURE — 99214 OFFICE O/P EST MOD 30 MIN: CPT | Performed by: STUDENT IN AN ORGANIZED HEALTH CARE EDUCATION/TRAINING PROGRAM

## 2025-04-23 PROCEDURE — 73110 X-RAY EXAM OF WRIST: CPT | Mod: TC | Performed by: STUDENT IN AN ORGANIZED HEALTH CARE EDUCATION/TRAINING PROGRAM

## 2025-04-23 NOTE — PATIENT INSTRUCTIONS
#Left> right wrist pain  Likely cause of patient's condition due to ganglion cyst.  Counseled patient on nature of condition and treatment options.    - Activity: activity as tolerated  - compression wrap or brace as needed for compression and to limit motion of wrist that aggravates pain  - Ice three times daily for 15-20min  - follow icing with diclofenac (voltaren) gel three times daily for 2-4 weeks.   - tylenol 1000mg three times daily as needed for any additional pain    Follow-up: In 4-8 weeks if symptoms do not improve, sooner if worsening  Can consider cortisone injection or referral to surgeon    Please call [phone] and ask for my team if you have any questions or concerns.

## 2025-04-23 NOTE — LETTER
4/23/2025      Radha Crockett  [address] [facility] [place] [address]      Dear Colleague,    Thank you for referring your patient, Radha Crockett, to the [facility]. Please see a copy of my visit note below.    ASSESSMENT & PLAN    Radha was seen today for pain.    Diagnoses and all orders for this visit:    Ganglion cyst of wrist, left  -     Orthopedic  Referral  -     Cancel: XR Wrist Left G/E 3 Views; Future  -     XR Wrist Bilateral G/E 3 vw; Future      This issue is acute and Worsening.    #Left> right wrist pain: Radha Crockett  was seen today for left> right wrist pain. Symptoms had been going on for 2 weeks. On examination there are positive findings of very tender lesion of distal radius. Imaging findings showed relatively unremarkable xrays with minimal OA.     Likely cause of patient's condition due to ganglion cyst.  Counseled patient on nature of condition and treatment options.    - Activity: activity as tolerated  - compression wrap or brace as needed for compression and to limit motion of wrist that aggravates pain  - Ice three times daily for 15-20min  - follow icing with diclofenac (voltaren) gel three times daily for 2-4 weeks.   - tylenol 1000mg three times daily as needed for any additional pain    Follow-up: In 4-8 weeks if symptoms do not improve, sooner if worsening  Can consider cortisone injection or referral to surgeon    Juan Tena MD  [facility]    -----  Chief Complaint   Patient presents with     Left Wrist - Pain       SUBJECTIVE  Radha Crockett is a/an 54 year old female who is seen in consultation at the request of  Holly Hernández M.D. for evaluation of left wrist.     The patient is seen by themselves.  The patient is Right handed    Onset: 2 week(s) ago. Reports insidious onset without acute precipitating event. She reports noticing a lump on the left radial wrist. She reports  significant pain with palpation of the lump or with use of the left hand.  Location of Pain: left radial wrist  Worsened by: palpation/pressure, use of left hand  Better with: Tylenol  Treatments tried: Tylenol, Voltaren gel  Associated symptoms: swelling    The patient also reports some right radial wrist pain over the last week. She reports that this pain feels similar to the left, but she does not have a painful lump.    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  CV: distal perfusion intact   Resp: normal respiratory effort without conversational dyspnea   Psych: normal mood and affect  Gait: NORMAL  Neuro: Normal light sensory exam of bilateral upper extremities    BILATERAL WRIST  Inspection:    2cm round, nodular lesion of the left radial wrist  Palpation:    Tender about the above lesion. Remainder of bony and ligamentous line marks are nontender.   Range of Motion:    Full (active and passive) flexion, extension, pronation/supination, and ulnar/radial deviation.  Strength:    No deficits in flexion, extension, ulnar/radial deviation, or  strength. Normal pinch strength.  Special Tests:    Negative: Finkelstein's.        RADIOLOGY:  Final results and radiologist's interpretation, available in the [facility] health record.  Images were reviewed with the patient in the office today.  My personal interpretation of the performed imaging:   - relatively unremarkable xrays with minimal OA.          Again, thank you for allowing me to participate in the care of your patient.        Sincerely,        Juan Tena MD    Electronically signed

## 2025-04-23 NOTE — PROGRESS NOTES
ASSESSMENT & PLAN    Radha was seen today for pain.    Diagnoses and all orders for this visit:    Ganglion cyst of wrist, left  -     Orthopedic  Referral  -     Cancel: XR Wrist Left G/E 3 Views; Future  -     XR Wrist Bilateral G/E 3 vw; Future      This issue is acute and Worsening.    #Left> right wrist pain: Radha Crockett  was seen today for left> right wrist pain. Symptoms had been going on for 2 weeks. On examination there are positive findings of very tender lesion of distal radius. Imaging findings showed relatively unremarkable xrays with minimal OA.     Likely cause of patient's condition due to ganglion cyst.  Counseled patient on nature of condition and treatment options.    - Activity: activity as tolerated  - compression wrap or brace as needed for compression and to limit motion of wrist that aggravates pain  - Ice three times daily for 15-20min  - follow icing with diclofenac (voltaren) gel three times daily for 2-4 weeks.   - tylenol 1000mg three times daily as needed for any additional pain    Follow-up: In 4-8 weeks if symptoms do not improve, sooner if worsening  Can consider cortisone injection or referral to surgeon    Juan Tena MD  [facility]    -----  Chief Complaint   Patient presents with    Left Wrist - Pain       SUBJECTIVE  Radha Crockett is a/an 54 year old female who is seen in consultation at the request of  Holly Hernández M.D. for evaluation of left wrist.     The patient is seen by themselves.  The patient is Right handed    Onset: 2 week(s) ago. Reports insidious onset without acute precipitating event. She reports noticing a lump on the left radial wrist. She reports significant pain with palpation of the lump or with use of the left hand.  Location of Pain: left radial wrist  Worsened by: palpation/pressure, use of left hand  Better with: Tylenol  Treatments tried: Tylenol, Voltaren gel  Associated symptoms:  swelling    The patient also reports some right radial wrist pain over the last week. She reports that this pain feels similar to the left, but she does not have a painful lump.    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  CV: distal perfusion intact   Resp: normal respiratory effort without conversational dyspnea   Psych: normal mood and affect  Gait: NORMAL  Neuro: Normal light sensory exam of bilateral upper extremities    BILATERAL WRIST  Inspection:    2cm round, nodular lesion of the left radial wrist  Palpation:    Tender about the above lesion. Remainder of bony and ligamentous line marks are nontender.   Range of Motion:    Full (active and passive) flexion, extension, pronation/supination, and ulnar/radial deviation.  Strength:    No deficits in flexion, extension, ulnar/radial deviation, or  strength. Normal pinch strength.  Special Tests:    Negative: Finkelstein's.        RADIOLOGY:  Final results and radiologist's interpretation, available in the [facility] health record.  Images were reviewed with the patient in the office today.  My personal interpretation of the performed imaging:   - relatively unremarkable xrays with minimal OA.

## 2025-05-05 ENCOUNTER — PATIENT OUTREACH (OUTPATIENT)
Dept: CARE COORDINATION | Facility: CLINIC | Age: 55
End: 2025-05-05
Payer: COMMERCIAL

## 2025-05-14 ENCOUNTER — TELEPHONE (OUTPATIENT)
Dept: FAMILY MEDICINE | Facility: CLINIC | Age: 55
End: 2025-05-14

## 2025-05-14 ENCOUNTER — ANCILLARY PROCEDURE (OUTPATIENT)
Dept: CARDIOLOGY | Facility: CLINIC | Age: 55
End: 2025-05-14
Attending: FAMILY MEDICINE
Payer: COMMERCIAL

## 2025-05-14 ENCOUNTER — RESULTS FOLLOW-UP (OUTPATIENT)
Dept: FAMILY MEDICINE | Facility: CLINIC | Age: 55
End: 2025-05-14

## 2025-05-14 DIAGNOSIS — R07.89 ATYPICAL CHEST PAIN: ICD-10-CM

## 2025-05-14 DIAGNOSIS — R07.89 ATYPICAL CHEST PAIN: Primary | ICD-10-CM

## 2025-05-14 LAB — LVEF ECHO: NORMAL

## 2025-05-14 PROCEDURE — 93306 TTE W/DOPPLER COMPLETE: CPT | Performed by: INTERNAL MEDICINE

## 2025-05-14 NOTE — TELEPHONE ENCOUNTER
----- Message from Susan ATKINS sent at 5/14/2025 12:55 PM CDT -----  Regarding: Cancelled Exercise Stress Echocardiogram  Good Afternoon,The patient arrived for her Exercise Stress Echo appointment. The patient reported having a severe headache with lightheadedness. With the patient laying flat, we took a set of vitals. The patient's blood pressure was 176/115 and her heart rate was 107. The patient appeared very anxious and questioned if she was having an active heart attack. We consulted our supervising cardiologist, Dr. Baptiste, who recommended giving the patient 10 minutes to rest and retaking vitals. The patient's blood pressure was 152/96. We moved forward with a resting complete echocardiogram. I would recommend the patient get a Lexiscan Nuclear Medicine Stress Test. The patient can continue taking the betablocker for this test. A progress note was put in the patient's chart stating why we cancelled the test today.If you have any questions please let me know.Susan Bael, BSExercise Physiologist

## 2025-05-14 NOTE — PROGRESS NOTES
The patient arrived for her Exercise Stress Echo appointment. The patient reported having a severe headache with lightheadedness. With the patient laying flat, we took a set of vitals. The patient's blood pressure was 176/115 and her heart rate was 107. The patient appeared very anxious and questioned if she was having an active heart attack. We consulted our supervising cardiologist, Dr. Baptiste, who recommended giving the patient 10 minutes to rest and retaking vitals. The patient's blood pressure was 152/96. We moved forward with a resting complete echocardiogram.

## 2025-05-14 NOTE — RESULT ENCOUNTER NOTE
Your echocardiogram shows no cause for chest pain.     Your test was not able to fully test for heart disease, so a nuclear stress test is recommended. You should be contacted to schedule.     Best regards,  Holly Hernández MD    If you have a question about the results, please use the ? (question cornell) option at the upper right corner.

## 2025-05-19 ENCOUNTER — HOSPITAL ENCOUNTER (OUTPATIENT)
Dept: CARDIOLOGY | Facility: CLINIC | Age: 55
Setting detail: NUCLEAR MEDICINE
Discharge: HOME | End: 2025-05-19
Attending: FAMILY MEDICINE | Admitting: FAMILY MEDICINE
Payer: COMMERCIAL

## 2025-05-19 ENCOUNTER — HOSPITAL ENCOUNTER (OUTPATIENT)
Dept: CARDIOLOGY | Facility: CLINIC | Age: 55
Setting detail: NUCLEAR MEDICINE
Discharge: HOME | End: 2025-05-19
Attending: FAMILY MEDICINE
Payer: COMMERCIAL

## 2025-05-19 ENCOUNTER — RESULTS FOLLOW-UP (OUTPATIENT)
Dept: FAMILY MEDICINE | Facility: CLINIC | Age: 55
End: 2025-05-19

## 2025-05-19 VITALS
HEIGHT: 66 IN | SYSTOLIC BLOOD PRESSURE: 142 MMHG | DIASTOLIC BLOOD PRESSURE: 84 MMHG | OXYGEN SATURATION: 99 % | BODY MASS INDEX: 33.94 KG/M2 | WEIGHT: 211.2 LBS

## 2025-05-19 VITALS — HEART RATE: 80 BPM | SYSTOLIC BLOOD PRESSURE: 138 MMHG | DIASTOLIC BLOOD PRESSURE: 88 MMHG

## 2025-05-19 DIAGNOSIS — R07.89 ATYPICAL CHEST PAIN: ICD-10-CM

## 2025-05-19 LAB
CV STRESS MAX HR HE: 105
RATE PRESSURE PRODUCT: NORMAL
STRESS ECHO BASELINE DIASTOLIC HE: 88
STRESS ECHO BASELINE HR: 80 BPM
STRESS ECHO BASELINE SYSTOLIC BP: 138
STRESS ECHO CALCULATED PERCENT HR: 63 %
STRESS ECHO LAST STRESS DIASTOLIC BP: 82
STRESS ECHO LAST STRESS SYSTOLIC BP: 120
STRESS ECHO TARGET HR: 166

## 2025-05-19 PROCEDURE — 250N000011 HC RX IP 250 OP 636: Mod: JZ | Performed by: INTERNAL MEDICINE

## 2025-05-19 PROCEDURE — 343N000001 HC RX 343 MED OP 636: Performed by: FAMILY MEDICINE

## 2025-05-19 PROCEDURE — 93017 CV STRESS TEST TRACING ONLY: CPT

## 2025-05-19 PROCEDURE — A9502 TC99M TETROFOSMIN: HCPCS | Performed by: FAMILY MEDICINE

## 2025-05-19 RX ORDER — ALBUTEROL SULFATE 90 UG/1
2 INHALANT RESPIRATORY (INHALATION) EVERY 5 MIN PRN
Status: DISCONTINUED | OUTPATIENT
Start: 2025-05-19 | End: 2025-05-20 | Stop reason: HOSPADM

## 2025-05-19 RX ORDER — AMINOPHYLLINE 25 MG/ML
50-100 INJECTION, SOLUTION INTRAVENOUS
Status: DISCONTINUED | OUTPATIENT
Start: 2025-05-19 | End: 2025-05-20 | Stop reason: HOSPADM

## 2025-05-19 RX ORDER — LIDOCAINE 40 MG/G
CREAM TOPICAL
Status: DISCONTINUED | OUTPATIENT
Start: 2025-05-19 | End: 2025-05-20 | Stop reason: HOSPADM

## 2025-05-19 RX ORDER — REGADENOSON 0.08 MG/ML
0.4 INJECTION, SOLUTION INTRAVENOUS ONCE
Status: COMPLETED | OUTPATIENT
Start: 2025-05-19 | End: 2025-05-19

## 2025-05-19 RX ORDER — SODIUM CHLORIDE 9 MG/ML
INJECTION, SOLUTION INTRAVENOUS CONTINUOUS PRN
Status: ACTIVE | OUTPATIENT
Start: 2025-05-19 | End: 2025-05-19

## 2025-05-19 RX ORDER — NITROGLYCERIN 0.4 MG/1
0.4 TABLET SUBLINGUAL EVERY 5 MIN PRN
Status: ACTIVE | OUTPATIENT
Start: 2025-05-19 | End: 2025-05-19

## 2025-05-19 RX ORDER — CAFFEINE 200 MG
200 TABLET ORAL
Status: ACTIVE | OUTPATIENT
Start: 2025-05-19 | End: 2025-05-19

## 2025-05-19 RX ORDER — CAFFEINE CITRATE 20 MG/ML
60 SOLUTION INTRAVENOUS
Status: ACTIVE | OUTPATIENT
Start: 2025-05-19 | End: 2025-05-19

## 2025-05-19 RX ADMIN — REGADENOSON 0.4 MG: 0.08 INJECTION, SOLUTION INTRAVENOUS at 14:57

## 2025-05-19 RX ADMIN — TETROFOSMIN 19.35 MILLICURIE: 1.38 INJECTION, POWDER, LYOPHILIZED, FOR SOLUTION INTRAVENOUS at 14:58

## 2025-05-19 RX ADMIN — TETROFOSMIN 6.55 MILLICURIE: 1.38 INJECTION, POWDER, LYOPHILIZED, FOR SOLUTION INTRAVENOUS at 12:53

## 2025-05-21 ENCOUNTER — TELEPHONE (OUTPATIENT)
Dept: FAMILY MEDICINE | Facility: CLINIC | Age: 55
End: 2025-05-21
Payer: COMMERCIAL

## 2025-05-21 NOTE — TELEPHONE ENCOUNTER
Reason for Call:  Appointment Request    Patient requesting this type of appt: Chronic Diease Management/Medication/Follow-Up    Requested provider: Holly Hernández    Reason patient unable to be scheduled: Not within requested timeframe    When does patient want to be seen/preferred time: sooner than July    Comments: call patient     Could we send this information to you in [employer] or would you prefer to receive a phone call?:   Patient would prefer a phone call   Okay to leave a detailed message?: Yes at Cell number on file:    Telephone Information:   Mobile [phone]       Call taken on 5/21/2025 at 12:04 PM by Diamond Carbajal

## 2025-05-26 ENCOUNTER — NURSE TRIAGE (OUTPATIENT)
Dept: NURSING | Facility: CLINIC | Age: 55
End: 2025-05-26
Payer: COMMERCIAL

## 2025-05-26 NOTE — TELEPHONE ENCOUNTER
Nurse Triage SBAR    Is this a 2nd Level Triage? NO    Situation: palpitations    Background: Patient stated that her blood pressure medications were increased within the past 2-3 weeks from 50 mg to 100 mg.  She states that with the change, she is having low blood pressures and feels awful.  Patient is also experiencing perimenopausal symptoms such as brain fog, lack of energy, anger/frustration, trouble sleeping, night sweating.  Tingling in arms and hands will go numb, not happening now.   Has been in the ER 3 times for chest pressure symptoms and they keep telling her that they can't find anything wrong and refer her back to her primary provider.  Has had cardiac work up which was negative.  She states she is under a lot of stress and does drink caffeine (2 cups of coffee) to try to raise her blood pressure but it doesn't help.    Assessment: She states that within a half hour after taking her blood pressure medication, she gets very dizzy and can't move around due to being so dizzy.  She is currently experiencing hear palpations and chest pressure/heaviness.    Recent blood pressures:    94/58-Saturday, May 24, 2025  94/56-Sunday, May 25, 2025  130/86, pulse 85 May 26, 2025 during call    Took medication at noon today.   Has difficulty breathing when her blood pressure drops low.         Protocol Recommended Disposition:   Call  Now    Recommendation: Recommend patient call 911 now.  Patient does not want to do this because she has been worked up 3 times for the same issues and they keep referring her back to her primary provider.  She states that she forgot today was a holiday and she will call the clinic tomorrow to try to get a sooner than July appointment.  Patient verbalized information and had no further questions.         Does the patient meet one of the following criteria for ADS visit consideration? 16+ years old, with an [facility] PCP     TIP  Providers, please consider if this condition is  "appropriate for management at one of our Acute and Diagnostic Services sites.     If patient is a good candidate, please use dotphrase <dot>triageresponse and select Refer to ADS to document.    Reason for Disposition   Chest pain lasting longer than 5 minutes and ANY of the following:    history of heart disease  (i.e., heart attack, bypass surgery, angina, angioplasty, CHF; not just a heart murmur)    described as crushing, pressure-like, or heavy    age > 44    age > 30 AND at least one cardiac risk factor (i.e., hypertension, diabetes, obesity, smoker or strong family history of heart disease)    not relieved with nitroglycerin    Additional Information   Negative: SEVERE difficulty breathing (e.g., struggling for each breath, speaks in single words)   Negative: Difficult to awaken or acting confused (e.g., disoriented, slurred speech)   Negative: Shock suspected (e.g., cold/pale/clammy skin, too weak to stand, low BP, rapid pulse)   Negative: Passed out (i.e., lost consciousness, collapsed and was not responding)    Answer Assessment - Initial Assessment Questions  1. LOCATION: \"Where does it hurt?\"        On the left side of the chest, upper left side  2. RADIATION: \"Does the pain go anywhere else?\" (e.g., into neck, jaw, arms, back)      Sometimes radiates to the middle, happening now  3. ONSET: \"When did the chest pain begin?\" (Minutes, hours or days)       Always there but changes intesity  4. PATTERN: \"Does the pain come and go, or has it been constant since it started?\"  \"Does it get worse with exertion?\"       constant  5. DURATION: \"How long does it last\" (e.g., seconds, minutes, hours)      Always there  6. SEVERITY: \"How bad is the pain?\"  (e.g., Scale 1-10; mild, moderate, or severe)     - MILD (1-3): doesn't interfere with normal activities      - MODERATE (4-7): interferes with normal activities or awakens from sleep     - SEVERE (8-10): excruciating pain, unable to do any normal activities        " "4/10  7. CARDIAC RISK FACTORS: \"Do you have any history of heart problems or risk factors for heart disease?\" (e.g., angina, prior heart attack; diabetes, high blood pressure, high cholesterol, smoker, or strong family history of heart disease)      High blood pressure, father  of a heart attack, mother has a heart issue, high cholesterol  8. PULMONARY RISK FACTORS: \"Do you have any history of lung disease?\"  (e.g., blood clots in lung, asthma, emphysema, birth control pills)      Has taken birth control pills in the past about 3 months ago.  Used for about 2 months and then stopped.  9. CAUSE: \"What do you think is causing the chest pain?\"      Maybe the low pressure  10. OTHER SYMPTOMS: \"Do you have any other symptoms?\" (e.g., dizziness, nausea, vomiting, sweating, fever, difficulty breathing, cough)        Dizziness, palpitations, sweating at night due to menopause, no sweating doing simple chores or sitting  11. PREGNANCY: \"Is there any chance you are pregnant?\" \"When was your last menstrual period?\"        Still getting period at times.  Last menstrual cycle was .    Protocols used: Chest Pain-A-AH    "

## 2025-05-27 NOTE — TELEPHONE ENCOUNTER
Called patient to schedule. She will call back once she is able to confirm an appointment slot     Fabio-

## 2025-05-28 ENCOUNTER — OFFICE VISIT (OUTPATIENT)
Dept: FAMILY MEDICINE | Facility: CLINIC | Age: 55
End: 2025-05-28
Payer: COMMERCIAL

## 2025-05-28 VITALS
RESPIRATION RATE: 18 BRPM | HEART RATE: 84 BPM | SYSTOLIC BLOOD PRESSURE: 135 MMHG | TEMPERATURE: 97 F | BODY MASS INDEX: 34.55 KG/M2 | DIASTOLIC BLOOD PRESSURE: 83 MMHG | WEIGHT: 215 LBS | OXYGEN SATURATION: 98 % | HEIGHT: 66 IN

## 2025-05-28 DIAGNOSIS — R73.01 IMPAIRED FASTING GLUCOSE: ICD-10-CM

## 2025-05-28 DIAGNOSIS — E55.9 VITAMIN D DEFICIENCY DISEASE: ICD-10-CM

## 2025-05-28 DIAGNOSIS — R07.9 CHEST PAIN, UNSPECIFIED TYPE: ICD-10-CM

## 2025-05-28 DIAGNOSIS — E78.5 HYPERLIPIDEMIA LDL GOAL <100: ICD-10-CM

## 2025-05-28 DIAGNOSIS — I10 HYPERTENSION GOAL BP (BLOOD PRESSURE) < 140/90: Primary | ICD-10-CM

## 2025-05-28 DIAGNOSIS — E66.9 OBESITY, UNSPECIFIED CLASS, UNSPECIFIED OBESITY TYPE, UNSPECIFIED WHETHER SERIOUS COMORBIDITY PRESENT: ICD-10-CM

## 2025-05-28 LAB
EST. AVERAGE GLUCOSE BLD GHB EST-MCNC: 128 MG/DL
HBA1C MFR BLD: 6.1 % (ref 0–5.6)

## 2025-05-28 PROCEDURE — 36415 COLL VENOUS BLD VENIPUNCTURE: CPT | Performed by: FAMILY MEDICINE

## 2025-05-28 PROCEDURE — 82306 VITAMIN D 25 HYDROXY: CPT | Performed by: FAMILY MEDICINE

## 2025-05-28 PROCEDURE — 99214 OFFICE O/P EST MOD 30 MIN: CPT | Performed by: FAMILY MEDICINE

## 2025-05-28 PROCEDURE — 83036 HEMOGLOBIN GLYCOSYLATED A1C: CPT | Performed by: FAMILY MEDICINE

## 2025-05-28 PROCEDURE — 80048 BASIC METABOLIC PNL TOTAL CA: CPT | Performed by: FAMILY MEDICINE

## 2025-05-28 PROCEDURE — 82172 ASSAY OF APOLIPOPROTEIN: CPT | Mod: 90 | Performed by: FAMILY MEDICINE

## 2025-05-28 PROCEDURE — 3075F SYST BP GE 130 - 139MM HG: CPT | Performed by: FAMILY MEDICINE

## 2025-05-28 PROCEDURE — 1126F AMNT PAIN NOTED NONE PRSNT: CPT | Performed by: FAMILY MEDICINE

## 2025-05-28 PROCEDURE — 3079F DIAST BP 80-89 MM HG: CPT | Performed by: FAMILY MEDICINE

## 2025-05-28 PROCEDURE — 3044F HG A1C LEVEL LT 7.0%: CPT | Performed by: FAMILY MEDICINE

## 2025-05-28 PROCEDURE — 85379 FIBRIN DEGRADATION QUANT: CPT | Performed by: FAMILY MEDICINE

## 2025-05-28 PROCEDURE — 99000 SPECIMEN HANDLING OFFICE-LAB: CPT | Performed by: FAMILY MEDICINE

## 2025-05-28 RX ORDER — LOSARTAN POTASSIUM 25 MG/1
25 TABLET ORAL DAILY
Qty: 30 TABLET | Refills: 1 | Status: SHIPPED | OUTPATIENT
Start: 2025-05-28

## 2025-05-28 RX ORDER — CHLORTHALIDONE 25 MG/1
25 TABLET ORAL DAILY
Qty: 30 TABLET | Refills: 1 | Status: SHIPPED | OUTPATIENT
Start: 2025-05-28

## 2025-05-28 ASSESSMENT — PAIN SCALES - GENERAL: PAINLEVEL_OUTOF10: NO PAIN (0)

## 2025-05-28 NOTE — PROGRESS NOTES
Assessment & Plan     (I10) Hypertension goal BP (blood pressure) < 140/90  (primary encounter diagnosis)  Comment: patient got dizzy and had some low blood pressure readings with previous combination pill.  The beta blocker may have contributed to symptoms.  Stop combo.  Start these two separate meds instead.  Call with problems / side effects.   Plan: chlorthalidone (HYGROTON) 25 MG tablet,         losartan (COZAAR) 25 MG tablet, Basic metabolic        panel             (E55.9) Vitamin D deficiency disease  Comment: check   Plan: Vitamin D Deficiency             (R73.01) Impaired fasting glucose  Comment: check   Plan: Hemoglobin A1c             (R07.9) Chest pain, unspecified type  Comment: check this; if pos then could do CT but clot unlikely   Plan: D dimer, quantitative             (E78.5) Hyperlipidemia LDL goal <100  Comment: patient wanted this checked  Plan: Apolipoprotein B             (E66.9) Obesity, unspecified class, unspecified obesity type, unspecified whether serious comorbidity present  Comment: keep working on healthy diet/exercise and wt loss; the lexiscan result is reassuring   Plan: as above                 Julius Garcia is a 54 year old, presenting for the following health issues:  Hypertension        5/28/2025     1:19 PM   Additional Questions   Roomed by Britney Cat     History of Present Illness       Hypertension: She presents for follow up of hypertension.  She does check blood pressure  regularly outside of the clinic. Outside blood pressures have been over 140/90. She follows a low salt diet.     She eats 0-1 servings of fruits and vegetables daily.She consumes 0 sweetened beverage(s) daily.   She is taking medications regularly.            Blood pressure 140-150 systolic    To emergency room twice    Got to 200 systolic    Seen a lot of providers    200/110 at emergency room, took a long time to come down    Blood pressure drops a lot after medication  Too low    Patient  "even ate salt sometimes    Chest pain    Tingling     Ankles swollen    Had regular stress test, then nuclear     Patient wondering about blood clot or hormones    Apo b test?    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  Physical Exam  Constitutional:       Appearance: She is well-developed.   HENT:      Head: Normocephalic and atraumatic.   Eyes:      Conjunctiva/sclera: Conjunctivae normal.   Neck:      Vascular: No carotid bruit.   Cardiovascular:      Rate and Rhythm: Normal rate and regular rhythm.      Heart sounds: Normal heart sounds.   Pulmonary:      Effort: Pulmonary effort is normal. No respiratory distress.      Breath sounds: Normal breath sounds.   Neurological:      Mental Status: She is alert and oriented to person, place, and time.         No edema    Radials symmetric     Labs pending        Signed Electronically by: Marcial Agudelo MD    "

## 2025-05-28 NOTE — PATIENT INSTRUCTIONS
Stop the previous blood pressure medication ( combination  pill )    Start two single medication blood pressure med    Chlorthalidone and losartan once daily    Stay well hydrated    The normal stress test gives you a green light to exercise more    We will send you lab results    Follow up in 1-2 months,  sooner if needed

## 2025-05-29 LAB
ANION GAP SERPL CALCULATED.3IONS-SCNC: 12 MMOL/L (ref 7–15)
APO B100 SERPL-MCNC: 126 MG/DL
BUN SERPL-MCNC: 17.4 MG/DL (ref 6–20)
CALCIUM SERPL-MCNC: 10.2 MG/DL (ref 8.8–10.4)
CHLORIDE SERPL-SCNC: 100 MMOL/L (ref 98–107)
CREAT SERPL-MCNC: 0.85 MG/DL (ref 0.51–0.95)
D DIMER PPP FEU-MCNC: <0.27 UG/ML FEU (ref 0–0.5)
EGFRCR SERPLBLD CKD-EPI 2021: 81 ML/MIN/1.73M2
GLUCOSE SERPL-MCNC: 118 MG/DL (ref 70–99)
HCO3 SERPL-SCNC: 29 MMOL/L (ref 22–29)
POTASSIUM SERPL-SCNC: 4 MMOL/L (ref 3.4–5.3)
SODIUM SERPL-SCNC: 141 MMOL/L (ref 135–145)
VIT D+METAB SERPL-MCNC: 22 NG/ML (ref 20–50)

## 2025-05-30 ENCOUNTER — RESULTS FOLLOW-UP (OUTPATIENT)
Dept: FAMILY MEDICINE | Facility: CLINIC | Age: 55
End: 2025-05-30

## 2025-05-30 NOTE — RESULT ENCOUNTER NOTE
"D dimer is normal, so blood clot very unlikely.    Hemoglobin a1c is still in \"prediabetes\" range.    The Apo B test is high, so it may be worth starting a prescription cholesterol medication.  This can be discussed at your follow up appointment.    Other results here are okay.    Marcial Agudelo MD        "

## 2025-06-02 ENCOUNTER — NURSE TRIAGE (OUTPATIENT)
Dept: FAMILY MEDICINE | Facility: CLINIC | Age: 55
End: 2025-06-02
Payer: COMMERCIAL

## 2025-06-02 NOTE — TELEPHONE ENCOUNTER
Provider Response to 2nd Level Triage Request    I have reviewed the RN documentation. My recommendation is:  Best addressed by PCP/specialist follow up in clinic within 7 days

## 2025-06-02 NOTE — TELEPHONE ENCOUNTER
"Nurse Triage SBAR    Is this a 2nd Level Triage? YES, LICENSED PRACTITIONER REVIEW IS REQUIRED    Situation: Patient and spouse called in together. C2C on file for .     Background: Patient states for the last 2 months she has been struggling with her blood pressure and symptoms, states she was started on a new BP med and it is not working. States she has been to the ED multiple times and has had many tests run.     Assessment: Patient states her BP right now is 135/98, . States the last 3 days she has had chest pain, high HR, SOB, pain in her L arm and shoulder, feeling dizzy. She is tearful on the phone stating she is frustrated as this has been going on for months now and no one can figure out what is going on. She feels every time she goes to the ED they don't give her any medication, they do testing, and tell her to go to her primary doctor.     Protocol Recommended Disposition:   Call ADS/Go to ED/UCC Now (Or To Office with PCP Approval)    Recommendation: Per protocol, patient was advised to go to ED now or call . Writer advised patient's spouse of this recommendation as well. They state each time she goes to ED, they don't do anything,  stating it will just be \"another shit show.\" Writer explained to patient that her symptoms are very concerning, and even though she has not felt she's gotten answers at the ED, that would be the best place for her at this time as they can assess and make sure she is stable. Again advised her to go to ED now or call . She states she thinks she will go to the ED now, but would like her doctor to make further recommendations. She would like staff to return call to her  at [phone].       Lisa Corrales, ANA PAULAN, RN              Reason for Disposition   Patient sounds very sick or weak to the triager    Additional Information   Negative: Sounds like a life-threatening emergency to the triager   Negative: Pregnant > 20 weeks or postpartum (< 6 " weeks after delivery) and new hand or face swelling   Negative: Pregnant > 20 weeks and BP > 140/90   Negative: Systolic BP >= 160 OR Diastolic >= 100, and any cardiac or neurologic symptoms (e.g., chest pain, difficulty breathing, unsteady gait, blurred vision)    Protocols used: High Blood Pressure-A-OH

## 2025-06-02 NOTE — TELEPHONE ENCOUNTER
Recent heart stress test was normal, so unlikely to be cardiac type pain    Okay for patient to follow up in clinic soon    Stay well hydrated, keep written record of blood pressure and pulse readings    Be seen promptly if symptoms acutely worsen    Please inform patient and schedule clinic appointment    Marcial Agudelo MD

## 2025-06-02 NOTE — TELEPHONE ENCOUNTER
Called pt but she asked RN to call  instead as she is currently in the ED.     Called pt's , able to schedule hospital follow up appointment for 6/5 at 11:00.    Svitlana Rausch RN

## 2025-06-05 ENCOUNTER — OFFICE VISIT (OUTPATIENT)
Dept: FAMILY MEDICINE | Facility: CLINIC | Age: 55
End: 2025-06-05
Payer: COMMERCIAL

## 2025-06-05 VITALS
HEIGHT: 66 IN | RESPIRATION RATE: 14 BRPM | BODY MASS INDEX: 34.55 KG/M2 | OXYGEN SATURATION: 97 % | WEIGHT: 215 LBS | TEMPERATURE: 98.3 F | HEART RATE: 105 BPM | SYSTOLIC BLOOD PRESSURE: 130 MMHG | DIASTOLIC BLOOD PRESSURE: 84 MMHG

## 2025-06-05 DIAGNOSIS — E66.811 OBESITY (BMI 30.0-34.9): ICD-10-CM

## 2025-06-05 DIAGNOSIS — I10 HYPERTENSION GOAL BP (BLOOD PRESSURE) < 140/90: ICD-10-CM

## 2025-06-05 DIAGNOSIS — I77.1 SUBCLAVIAN ARTERY STENOSIS: Primary | ICD-10-CM

## 2025-06-05 DIAGNOSIS — F41.1 GAD (GENERALIZED ANXIETY DISORDER): ICD-10-CM

## 2025-06-05 DIAGNOSIS — N88.8 CERVICAL MASS: ICD-10-CM

## 2025-06-05 DIAGNOSIS — E78.5 HYPERLIPIDEMIA LDL GOAL <100: ICD-10-CM

## 2025-06-05 RX ORDER — ASPIRIN 81 MG/1
81 TABLET, CHEWABLE ORAL DAILY
COMMUNITY
Start: 2025-06-04

## 2025-06-05 RX ORDER — ATORVASTATIN CALCIUM 40 MG/1
40 TABLET, FILM COATED ORAL DAILY
COMMUNITY
Start: 2025-06-04

## 2025-06-05 ASSESSMENT — PAIN SCALES - GENERAL: PAINLEVEL_OUTOF10: MILD PAIN (2)

## 2025-06-05 NOTE — PROGRESS NOTES
Assessment & Plan       ICD-10-CM    1. Subclavian artery stenosis  I77.1       2. Hyperlipidemia LDL goal <100  E78.5       3. Hypertension goal BP (blood pressure) < 140/90  I10       4. Cervical mass  N88.8 Ob/Gyn  Referral      5. MARCO ANTONIO (generalized anxiety disorder)  F41.1       6. Obesity (BMI 30.0-34.9)  E66.811         We discussed the above items  We will continue her same medications  I suggested she take her chlorthalidone in the morning and the losartan in the evening to see if that helps to decrease her dizziness  I will refer her to Dr. Garsia for further workup of the cervical mass    I advised her to follow-up with her PCP in 2 to 3 months for a fasting office visit to recheck lipids and to follow-up on the other items above      MED REC REQUIRED  Post Medication Reconciliation Status: discharge medications reconciled, continue medications without change        Julius Garcia is a 54 year old, presenting for the following health issues:  Hospital F/U      6/5/2025    10:45 AM   Additional Questions   Roomed by CAM   Accompanied by none     History of Present Illness       Hypertension: She presents for follow up of hypertension.  She does check blood pressure  regularly outside of the clinic. Outside blood pressures have been over 140/90. She follows a low salt diet.     She eats 0-1 servings of fruits and vegetables daily.She consumes 0 sweetened beverage(s) daily.   She is taking medications regularly.        She was seen twice in the ER earlier this week with left-sided chest symptoms.  She had numerous test done and ultimately was found to have subclavian artery stenosis.  She was started on low-dose aspirin and atorvastatin.  She was also found to have a 3 cm cervical mass.  She was advised to follow-up with gynecology on that.  She has seen Dr. Garsia previously.  She had a normal Pap smear in February 2021, which was her last Pap smear.  She does struggle with anxiety.  She is  "nervous about the above items.  She has questions about her weight and dieting, etc.    Patient Active Problem List   Diagnosis    Bilateral chronic vestibular neuritis    Hypertension goal BP (blood pressure) < 140/90    Class 2 severe obesity with body mass index (BMI) of 35 to 39.9 with serious comorbidity (H)    MARCO ANTONIO (generalized anxiety disorder)    Ganglion cyst of wrist, left     Current Outpatient Medications   Medication Sig Dispense Refill    aspirin (ASA) 81 MG chewable tablet Take 81 mg by mouth daily.      atorvastatin (LIPITOR) 40 MG tablet Take 40 mg by mouth daily.      chlorthalidone (HYGROTON) 25 MG tablet Take 1 tablet (25 mg) by mouth daily. 30 tablet 1    diclofenac (VOLTAREN) 1 % topical gel Apply 2 g topically 4 times daily. 50 g 0    losartan (COZAAR) 25 MG tablet Take 1 tablet (25 mg) by mouth daily. 30 tablet 1    melatonin 1 MG TABS tablet Take 1 mg by mouth nightly as needed for sleep.       No current facility-administered medications for this visit.           Review of Systems  She generally takes her blood pressure medicines around noon or so and then does often get some lower blood pressure readings and dizziness in the afternoon.  Systolic reading is often around 98.      Objective    /84 (BP Location: Left arm, Patient Position: Chair, Cuff Size: Adult Large)   Pulse 105   Temp 98.3  F (36.8  C) (Temporal)   Resp 14   Ht 1.676 m (5' 6\")   Wt 97.5 kg (215 lb)   LMP 03/06/2025 (Exact Date)   SpO2 97%   BMI 34.70 kg/m    Body mass index is 34.7 kg/m .  Physical Exam   GENERAL: alert and no distress    Her ER notes and test results were reviewed.        Signed Electronically by: Uche Riddle MD    "

## 2025-06-23 ENCOUNTER — NURSE TRIAGE (OUTPATIENT)
Dept: FAMILY MEDICINE | Facility: CLINIC | Age: 55
End: 2025-06-23
Payer: COMMERCIAL

## 2025-06-23 NOTE — TELEPHONE ENCOUNTER
S-(situation): Care coordinator called stating that pt has been having dizziness for the past few months and is wanting to make an appointment. Care coordinator wanted pt to be called by RN to help evaluate symptoms. RN called pt to triage ongoing dizziness.     B-(background): Pt state she has been in and out of the ER recently (last in ER on 6/2). In ER pt was diagnosed with left subclavian arterial stensosis.States she was also in ER on 3/29 after a home BP reading of 200/100. Had a recent hospital follow up on 6/5 with Dr. Riddle.     A-(assessment): States she is not currently dizzy but has been frequently this last month. When she is dizzy she checks her blood pressure and sees low readings sometimes with systolic in the 90s. Recent readings: 99/75, 100/74, 102/74. Pt states HR is always in the 90s or 100s. States sometimes she is dizzy the whole afternoon/evening and has difficulty doing tasks at home. States she has not fainted. Laying down sometimes helps but is still dizzy when laying. Currently her blood pressure is 134/92 with a HR of 95. Pt states she typically takes her blood pressure medication at noon and then begins to feel dizzy sometimes by 1 or 2 pm.    R-(recommendations): Per protocol pt to be seen today, pt refusing to be seen today but is okay with being seen in office tomorrow. Pt okay with seeing Dr. Riddle 6/24/2025 10:30 AM (Arrive by 10:10 AM), briefly discussed pt with provider. Other sooner appointments were available but pt was not okay with seeing those providers or going to a different location despite recommendations by RN. Advised pt to continue checking BP at home and if symptoms worsen or if BP is low or high to give us a call. Pt verbalized understanding.       Reason for Disposition   MODERATE dizziness (e.g., interferes with normal activities)  (Exception: Dizziness caused by heat exposure, sudden standing, or poor fluid intake.)    Additional Information   Negative: SEVERE  difficulty breathing (e.g., struggling for each breath, speaks in single words)   Negative: Shock suspected (e.g., cold/pale/clammy skin, too weak to stand, low BP, rapid pulse)   Negative: Difficult to awaken or acting confused (e.g., disoriented, slurred speech)   Negative: Fainted, and still feels dizzy afterwards   Negative: Overdose (accidental or intentional) of medications   Negative: New neurologic deficit that is present now: * Weakness of the face, arm, or leg on one side of the body * Numbness of the face, arm, or leg on one side of the body * Loss of speech or garbled speech   Negative: Heart beating < 50 beats per minute OR > 140 beats per minute   Negative: Sounds like a life-threatening emergency to the triager   Negative: Chest pain   Negative: Rectal bleeding, bloody stool, or tarry-black stool   Negative: Vomiting is main symptom   Negative: Diarrhea is main symptom   Negative: Headache is main symptom   Negative: Heat exhaustion suspected (i.e., dehydration from heat exposure)   Negative: Patient states that they are having an anxiety or panic attack   Negative: Dizziness from low blood sugar (i.e., < 60 mg/dl or 3.5 mmol/l)   Negative: SEVERE dizziness (e.g., unable to stand, requires support to walk, feels like passing out now)   Negative: Spinning or tilting sensation (vertigo) present now and one or more stroke risk factors (i.e., hypertension, diabetes mellitus, prior stroke/TIA, heart attack, age over 60) (Exception: Prior physician evaluation for this AND no different/worse than usual.)   Negative: SEVERE headache or neck pain   Negative: Neurologic deficit that was brief (now gone), ANY of the following:* Weakness of the face, arm, or leg on one side of the body* Numbness of the face, arm, or leg on one side of the body* Loss of speech or garbled speech   Negative: Loss of vision or double vision  (Exception: Similar to previous migraines.)   Negative: Extra heartbeats, irregular heart  "beating, or heart is beating very fast (i.e., 'palpitations')   Negative: Difficulty breathing   Negative: Drinking very little and dehydration suspected (e.g., no urine > 12 hours, very dry mouth, very lightheaded)   Negative: Follows bleeding (e.g., stomach, rectum, vagina)  (Exception: Became dizzy from sight of small amount blood.)   Negative: Patient sounds very sick or weak to the triager   Negative: Lightheadedness (dizziness) present now, after 2 hours of rest and fluids   Negative: Spinning or tilting sensation (vertigo) present now   Negative: Fever > 103 F (39.4 C)   Negative: Fever > 100.0 F (37.8 C) and has diabetes mellitus or a weak immune system (e.g., HIV positive, cancer chemotherapy, organ transplant, splenectomy, chronic steroids)    Answer Assessment - Initial Assessment Questions  1. DESCRIPTION: \"Describe your dizziness.\"      Starts slowly then it gets worse, states it feel like the whole room is spinning  2. LIGHTHEADED: \"Do you feel lightheaded?\" (e.g., somewhat faint, woozy, weak upon standing)      Yes  3. VERTIGO: \"Do you feel like either you or the room is spinning or tilting?\" (i.e. vertigo)      Yes  4. SEVERITY: \"How bad is it?\"  \"Do you feel like you are going to faint?\" \"Can you stand and walk?\"    - MILD: Feels slightly dizzy, but walking normally.    - MODERATE: Feels unsteady when walking, but not falling; interferes with normal activities (e.g., school, work).    - SEVERE: Unable to walk without falling, or requires assistance to walk without falling; feels like passing out now.       Severe  5. ONSET:  \"When did the dizziness begin?\"      Since March   6. AGGRAVATING FACTORS: \"Does anything make it worse?\" (e.g., standing, change in head position)      Has to lay down and that improves a little.   7. HEART RATE: \"Can you tell me your heart rate?\" \"How many beats in 15 seconds?\"  (Note: not all patients can do this)        95  8. CAUSE: \"What do you think is causing the " "dizziness?\"      Low blood pressure  9. RECURRENT SYMPTOM: \"Have you had dizziness before?\" If Yes, ask: \"When was the last time?\" \"What happened that time?\"      Has been having it since March has not had it prior.   10. OTHER SYMPTOMS: \"Do you have any other symptoms?\" (e.g., fever, chest pain, vomiting, diarrhea, bleeding)        States she has had chest heaviness, has been assessed for this in ER. Pt states nothing has changed with the chest heaviness.   11. PREGNANCY: \"Is there any chance you are pregnant?\" \"When was your last menstrual period?\"        No    Protocols used: Dizziness-A-OH      Svitlana Rausch RN    "

## 2025-06-24 ENCOUNTER — OFFICE VISIT (OUTPATIENT)
Dept: FAMILY MEDICINE | Facility: CLINIC | Age: 55
End: 2025-06-24
Payer: COMMERCIAL

## 2025-06-24 VITALS
RESPIRATION RATE: 12 BRPM | HEART RATE: 92 BPM | DIASTOLIC BLOOD PRESSURE: 85 MMHG | SYSTOLIC BLOOD PRESSURE: 133 MMHG | OXYGEN SATURATION: 96 % | BODY MASS INDEX: 34.55 KG/M2 | TEMPERATURE: 97.9 F | WEIGHT: 215 LBS | HEIGHT: 66 IN

## 2025-06-24 DIAGNOSIS — N88.8 CERVICAL MASS: ICD-10-CM

## 2025-06-24 DIAGNOSIS — I77.1 SUBCLAVIAN ARTERY STENOSIS: ICD-10-CM

## 2025-06-24 DIAGNOSIS — F41.1 GAD (GENERALIZED ANXIETY DISORDER): ICD-10-CM

## 2025-06-24 DIAGNOSIS — R42 DIZZINESS: ICD-10-CM

## 2025-06-24 DIAGNOSIS — H81.23 BILATERAL CHRONIC VESTIBULAR NEURITIS: ICD-10-CM

## 2025-06-24 DIAGNOSIS — I10 HYPERTENSION GOAL BP (BLOOD PRESSURE) < 140/90: Primary | ICD-10-CM

## 2025-06-24 PROCEDURE — 3079F DIAST BP 80-89 MM HG: CPT | Performed by: FAMILY MEDICINE

## 2025-06-24 PROCEDURE — 1126F AMNT PAIN NOTED NONE PRSNT: CPT | Performed by: FAMILY MEDICINE

## 2025-06-24 PROCEDURE — 99214 OFFICE O/P EST MOD 30 MIN: CPT | Performed by: FAMILY MEDICINE

## 2025-06-24 PROCEDURE — 3075F SYST BP GE 130 - 139MM HG: CPT | Performed by: FAMILY MEDICINE

## 2025-06-24 ASSESSMENT — PAIN SCALES - GENERAL: PAINLEVEL_OUTOF10: NO PAIN (0)

## 2025-06-24 NOTE — PROGRESS NOTES
Assessment & Plan       ICD-10-CM    1. Hypertension goal BP (blood pressure) < 140/90  I10 Chlorthalidone 12.5 MG TABS      2. Dizziness  R42       3. Bilateral chronic vestibular neuritis  H81.23       4. Cervical mass  N88.8       5. Subclavian artery stenosis  I77.1       6. MARCO ANTONIO (generalized anxiety disorder)  F41.1         It sounds like she has been having some low blood pressure readings which I suspect are contributing to her dizziness  I therefore suggested decreasing the chlorthalidone to 12.5 mg daily  Continue the same losartan and other medications  Follow-up with gynecology in a few days as scheduled to address the cervical mass and hormone questions  I advised her to follow-up with her PCP in a month or so and for ongoing care and follow-up on the above items      Julius Garcia is a 54 year old, presenting for the following health issues:  RECHECK (Recheck dizzy)      6/24/2025    10:17 AM   Additional Questions   Roomed by cam   Accompanied by none     History of Present Illness       Hypertension: She presents for follow up of hypertension.  She does check blood pressure  regularly outside of the clinic. Outpatient blood pressures have not been over 140/90. She does not follow a low salt diet.     Vascular Disease:  She presents for follow up of vascular disease.     She never takes nitroglycerin. She takes daily aspirin.    She eats 0-1 servings of fruits and vegetables daily.She consumes 0 sweetened beverage(s) daily.She exercises with enough effort to increase her heart rate 9 or less minutes per day.  She exercises with enough effort to increase her heart rate 3 or less days per week.   She is taking medications regularly.      I had seen her a few weeks ago for ER follow-up.  See previous note for details on that.  She has been having some ongoing intermittent dizziness since then.  She will often check her blood sugar readings in the middle the day and they commonly are on the low side,  "often around 100 systolic or so.  She has been taking losartan 25 mg daily and chlorthalidone 25 mg daily for hypertension.  Her chart shows a history of bilateral chronic vestibular neuritis, which could be another source of dizziness.  She also will be seeing gynecology in a few days for a cervical mass and also has questions about her female hormones to ask him.  A friend of hers had some similar symptoms of dizziness, then went on estradiol and progesterone and that helped her quite a bit.  The patient was found to have subclavian artery stenosis through her ER workup and is on low-dose aspirin and a statin now for that.    Patient Active Problem List   Diagnosis    Bilateral chronic vestibular neuritis    Hypertension goal BP (blood pressure) < 140/90    Class 2 severe obesity with body mass index (BMI) of 35 to 39.9 with serious comorbidity (H)    MARCO ANTONIO (generalized anxiety disorder)    Ganglion cyst of wrist, left     Current Outpatient Medications   Medication Sig Dispense Refill    aspirin (ASA) 81 MG chewable tablet Take 81 mg by mouth daily.      atorvastatin (LIPITOR) 40 MG tablet Take 40 mg by mouth daily.      chlorthalidone (HYGROTON) 25 MG tablet Take 1 tablet (25 mg) by mouth daily. 30 tablet 1       1    losartan (COZAAR) 25 MG tablet Take 1 tablet (25 mg) by mouth daily. 30 tablet 1    melatonin 1 MG TABS tablet Take 1 mg by mouth nightly as needed for sleep.       No current facility-administered medications for this visit.           Review of Systems  The patient does acknowledge feeling quite anxious about her above symptoms and health conditions.      Objective    /85 (BP Location: Left arm, Patient Position: Chair, Cuff Size: Adult Regular)   Pulse 92   Temp 97.9  F (36.6  C) (Temporal)   Resp 12   Ht 1.676 m (5' 6\")   Wt 97.5 kg (215 lb)   LMP 03/06/2025 (Exact Date)   SpO2 96%   BMI 34.70 kg/m    Body mass index is 34.7 kg/m .  Physical Exam   GENERAL: alert and no distress  CV: " regular rate and rhythm, normal S1 S2, no S3 or S4, no murmur, click or rub  PSYCH: mentation appears normal, affect normal to anxious    Past ER information was reviewed.        Signed Electronically by: Uche Riddle MD

## 2025-06-27 ENCOUNTER — OFFICE VISIT (OUTPATIENT)
Dept: OBGYN | Facility: CLINIC | Age: 55
End: 2025-06-27
Attending: FAMILY MEDICINE
Payer: COMMERCIAL

## 2025-06-27 ENCOUNTER — ANCILLARY PROCEDURE (OUTPATIENT)
Dept: ULTRASOUND IMAGING | Facility: CLINIC | Age: 55
End: 2025-06-27
Attending: OBSTETRICS & GYNECOLOGY
Payer: COMMERCIAL

## 2025-06-27 VITALS — SYSTOLIC BLOOD PRESSURE: 132 MMHG | HEART RATE: 90 BPM | OXYGEN SATURATION: 96 % | DIASTOLIC BLOOD PRESSURE: 86 MMHG

## 2025-06-27 DIAGNOSIS — N88.8 CERVICAL MASS: ICD-10-CM

## 2025-06-27 DIAGNOSIS — R93.5 ABNORMAL CT SCAN, PELVIS: ICD-10-CM

## 2025-06-27 DIAGNOSIS — N88.8 CERVICAL MASS: Primary | ICD-10-CM

## 2025-06-27 PROCEDURE — 3079F DIAST BP 80-89 MM HG: CPT | Performed by: OBSTETRICS & GYNECOLOGY

## 2025-06-27 PROCEDURE — 76856 US EXAM PELVIC COMPLETE: CPT | Mod: TC | Performed by: RADIOLOGY

## 2025-06-27 PROCEDURE — 76830 TRANSVAGINAL US NON-OB: CPT | Mod: TC | Performed by: RADIOLOGY

## 2025-06-27 PROCEDURE — 99213 OFFICE O/P EST LOW 20 MIN: CPT | Performed by: OBSTETRICS & GYNECOLOGY

## 2025-06-27 PROCEDURE — 3075F SYST BP GE 130 - 139MM HG: CPT | Performed by: OBSTETRICS & GYNECOLOGY

## 2025-06-27 NOTE — PROGRESS NOTES
Radha is a 54 year old female .  I have been asked to see patient in consultation by Dr. Riddle regarding a cervical mass.  This was seen on the CT scan of the abdomen and pelvis.  As noted below, it is described as a thickening, measuring 3.2 cm.    She has no history of a cervical mass.   Her last pap smear was , which was NIL and negative HR HPV   She has no known aggravating or alleviating factors.      CT Abdomen Pelvis w Contrast  Order: [id number]    EXAM: CT ABDOMEN PELVIS W  LOCATION: [facility]  DATE: 2025  INDICATION: epigastric abdominal pain  COMPARISON: 10/18/21  TECHNIQUE: CT scan of the abdomen and pelvis was performed following injection of IV contrast. Multiplanar reformats were obtained. Dose reduction techniques were used.  CONTRAST: IOHEXOL 350 MG IODINE/ML  ML BOTTLE: 114mL  FINDINGS:  LOWER CHEST: Small sliding hiatal hernia. Mild circumferential thickening of the visualized distal esophagus.  HEPATOBILIARY: Hepatic steatosis. Normal gallbladder and biliary tree.  PANCREAS: Normal.  SPLEEN: Normal.  ADRENAL GLANDS: Normal.  KIDNEYS/BLADDER: Normal appearance of the renal parenchyma. No calculus, hydronephrosis, or perinephric stranding. Ureters and urinary bladder appear normal.  BOWEL: Normal distal esophagus, stomach, small bowel, large bowel, and appendix.  LYMPH NODES: Normal.  VASCULATURE: Mild atheromatous changes; normal caliber abdominal aorta. Normal enhancement of the mesenteric vessels.  PELVIC ORGANS: Normal uterus and ovaries.  MUSCULOSKELETAL: No bone or body wall lesion.  Impression  1.  Small sliding hiatal hernia with mild circumstantial thickening of the visualized distal thoracic esophagus which may represent esophagitis. There are no symptoms suggesting an inflammatory process, consider follow-up with EGD to exclude neoplasm.  2.  Hepatic steatosis.          Narrative  EXAM: CTA CHEST ABDOMEN PELVIS AORTIC DISSECTION W  LOCATION: [facility]  DATE: 6/2/2025  INDICATION: Acute aortic syndrome (AAS) suspected  COMPARISON: CT 1/24/2025  TECHNIQUE: CT angiogram chest abdomen pelvis during arterial phase of injection of IV contrast. 2D and 3D MIP reconstructions were performed by the CT technologist. Dose reduction techniques were used.  CONTRAST: Omnipaque [id number]  FINDINGS:  CT ANGIOGRAM CHEST, ABDOMEN, AND PELVIS: No evidence of aortic aneurysm or dissection. The left vertebral artery originates from the aortic arch, a normal anatomic variant. There is moderate to severe stenosis of the proximal left subclavian artery (8/198) with mild eccentric hazy attenuation of the vessel. 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 The celiac, superior mesenteric, bilateral renal, and inferior mesenteric arteries are patent. The iliofemoral vasculature is patent. Mild aortic calcific atherosclerosis. The main pulmonary arteries normal caliber. No central pulmonary embolism.  LUNGS AND PLEURA: Mild biapical scarring. No pneumothorax, pleural effusion, or focal airspace consolidations.Calcified granuloma in the right lower lobe.  MEDIASTINUM/AXILLAE: No pericardial effusion. No thoracic aortic aneurysm. No mediastinal lymphadenopathy.  CORONARY ARTERY CALCIFICATION: None.  HEPATOBILIARY: Hepatic steatosis. No focal hepatic mass. No calcified gallstones.  PANCREAS: Normal.  SPLEEN: Normal.  ADRENAL GLANDS: Normal.  KIDNEYS/BLADDER: Normal.  BOWEL: Small hiatal hernia. Colonic diverticulosis without evidence of acute diverticulitis. Normal appendix. No evidence of bowel obstruction.  LYMPH NODES: No adenopathy by size criteria.  PELVIC ORGANS: 3.2 cm masslike thickening in the cervix (10/63).  MUSCULOSKELETAL: Osseous degenerative changes.  Impression  1.  No evidence of aortic aneurysm or dissection.  2.  Moderate to severe stenosis in the proximal left subclavian artery with mild hazy attenuation about the vessel, which raises the suspicion for vasculitis with  the consideration of large vessel arteritis. Recommend clinical correlation.  3.  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  4.  Hepatic steatosis.  5.  3.2 cm masslike thickening in the cervix. Recommend pelvic ultrasound for further evaluation.  6.  Small hiatal hernia.  7.  Colonic diverticulosis without evidence of acute diverticulitis.        Past Medical History:   Diagnosis Date    Bilateral chronic vestibular neuritis 2020    MARCO ANTONIO (generalized anxiety disorder)     Hepatic steatosis     Hiatal hernia     High cholesterol     Hypertension goal BP (blood pressure) < 140/90     Recurrent miscarriages 01/10/2012       Past Surgical History:   Procedure Laterality Date    NOSE SURGERY  2009    cosmetic reasons       OB History    Para Term  AB Living   4 0 0 0 4 0   SAB IAB Ectopic Multiple Live Births   3 1 0 0 0      # Outcome Date GA Lbr Ezequiel/2nd Weight Sex Type Anes PTL Lv   4 SAB            3 IAB            2 SAB            1 SAB                Gynecological History            Patient's last menstrual period was 2025 (exact date).     no STD/no PID/no IUD   No history abnormal pap smear (last pap 2021)     see above HPI        Allergies   Allergen Reactions    Clomid [Clomiphene Citrate] Other (See Comments)     Presumed Ovarian hyperstimulation       Current Outpatient Medications   Medication Sig Dispense Refill    aspirin (ASA) 81 MG chewable tablet Take 81 mg by mouth daily.      atorvastatin (LIPITOR) 40 MG tablet Take 40 mg by mouth daily.      chlorthalidone (HYGROTON) 25 MG tablet Take 1 tablet (25 mg) by mouth daily. 30 tablet 1    Chlorthalidone 12.5 MG TABS Take 1 tablet by mouth daily. 30 tablet 1    losartan (COZAAR) 25 MG tablet Take 1 tablet (25 mg) by mouth daily. 30 tablet 1    melatonin 1 MG TABS tablet Take 1 mg by mouth nightly as needed for sleep.         Social History     Socioeconomic History    Marital status:      Spouse name:  Not on file    Number of children: 0    Years of education: Not on file    Highest education level: Not on file   Occupational History    Not on file   Tobacco Use    Smoking status: Former     Current packs/day: 0.00     Average packs/day: 0.5 packs/day for 20.0 years (10.0 ttl pk-yrs)     Types: Cigarettes     Start date: 1990     Quit date: 2010     Years since quittin.9     Passive exposure: Never    Smokeless tobacco: Former     Quit date: 12/3/2010   Vaping Use    Vaping status: Never Used   Substance and Sexual Activity    Alcohol use: Yes     Alcohol/week: 0.0 standard drinks of alcohol     Comment: seldom use    Drug use: No    Sexual activity: Yes     Partners: Male   Other Topics Concern    Parent/sibling w/ CABG, MI or angioplasty before 65F 55M? Not Asked   Social History Narrative    Lives at home with her .       Social Drivers of Health     Financial Resource Strain: Low Risk  (6/3/2025)    Received from [facility]    Financial Resource Strain     Difficulty of Paying Living Expenses: 3     Difficulty of Paying Living Expenses: Not on file   Food Insecurity: No Food Insecurity (6/3/2025)    Received from [facility]    Food Insecurity     Do you worry your food will run out before you are able to buy more?: 1   Transportation Needs: No Transportation Needs (6/3/2025)    Received from [facility]    Transportation Needs     Does lack of transportation keep you from medical appointments?: 1     Does lack of transportation keep you from work, meetings or getting things that you need?: 1   Physical Activity: Not on file   Stress: Not on file   Social Connections: Socially Integrated (6/3/2025)    Received from [facility]    Social Connections     Do you often feel lonely or isolated from those around you?: 0   Interpersonal Safety: Low Risk   (1/21/2025)    Interpersonal Safety     Do you feel physically and emotionally safe where you currently live?: Yes     Within the past 12 months, have you been hit, slapped, kicked or otherwise physically hurt by someone?: No     Within the past 12 months, have you been humiliated or emotionally abused in other ways by your partner or ex-partner?: No   Housing Stability: Low Risk  (6/3/2025)    Received from [facility]    Housing Stability     What is your housing situation today?: 1       Family History   Problem Relation Age of Onset    Gastrointestinal Disease Mother         GI bleeding, source unknown    Vascular Disease Mother         heart issue?, bad kidney    Myocardial Infarction Father         at age 63 years     Diabetes Father     Hyperlipidemia Brother     Leukemia Paternal Aunt        Review of Systems:  10 point ROS of systems including Constitutional, Eyes, Respiratory, Cardiovascular, Gastroenterology, Genitourinary, Integumentary, Muscularskeletal, Psychiatric were all negative except for pertinent positives noted in my HPI and in the PMH.        EXAM:  /86 (BP Location: Right arm, Cuff Size: Adult Large)   Pulse 90   LMP 03/06/2025 (Exact Date)   SpO2 96%   There is no height or weight on file to calculate BMI.  General:  WNWD female, NAD  Alert  Oriented x 3  Gait:  Normal  Skin:  Normal skin turgor  HEENT:  NC/AT, EOMI  Neck:  No masses noted, symmetrical  Lungs:  Good respiratory effort   Abdomen:  Non-tender, non-distended.  Vulva: No external lesions, normal hair distribution, no adenopathy  BUS:  Normal, no masses noted  Urethra:  No hypermobility noted   Urethral meatus:  No masses or lesions seen.  Vagina: Moist, pink, no abnormal discharge, well rugated, no lesions  Cervix: Smooth, pink, no visible lesions, no masses seen.  The appears normal and not enlarged.   Uterus: Normal size, anteverted, non-tender, mobile  Ovaries: No mass, non-tender,  mobile  Perianal: no masses or lesions seen.   Extremities:  No clubbing, cyanosis or edema.       The ultrasound was ordered and performed today.  The results are as noted:  EXAM: ULTRASOUND PELVIS WITH ENDOVAGINAL IMAGING  LOCATION: [facility]  DATE/TIME: 06/27/2025, 11:39 AM CDT     INDICATION: Cervical lesion on CT scan.  COMPARISON: 06/02/2025 - CT chest, abdomen and pelvis. The report from this study describes 3.2 cm mass-like thickening of the cervix.  TECHNIQUE: Transabdominal scans were performed. Endovaginal ultrasound was performed to better visualize the adnexa.  FINDINGS:  UTERUS: 7.7 x 3.9 x 4.8 cm in long axis, short axis and transverse dimensions, respectively. No myometrial masses. No visualized cervical mass.  ENDOMETRIUM: The endometrial stripe is homogeneous, measuring 0.6 cm in thickness.  RIGHT OVARY: Unremarkable, measuring 2.2 x 1.1 x 1.1 cm.  LEFT OVARY: Unremarkable, measuring 2.0 x 1.5 x 1.3 cm.  OTHER: No adnexal masses. No free fluid in the pelvis.                                                              IMPRESSION:   1. Unremarkable appearance of the uterus and ovaries.  2. No ultrasound evidence of a cervical mass.         ASSESSMENT:  Cervical mass on CT scan       PLAN:  Schedule ultrasound.  This has been performed and the results are as noted above.   The ultrasound is better at pelvic evaluation, compared to the CT scan.  Since no concerning findings are noted on the ultrasound, no further evaluation is suggested at this time.      Total time preparing to see patient with reviewing prior encounter and labs, face to face time, coordinating care and documentation, on the same calendar date:  25 minutes.      Renzo Garsia MD

## 2025-06-30 ENCOUNTER — RESULTS FOLLOW-UP (OUTPATIENT)
Dept: OBGYN | Facility: CLINIC | Age: 55
End: 2025-06-30

## 2025-07-03 ENCOUNTER — VIRTUAL VISIT (OUTPATIENT)
Dept: FAMILY MEDICINE | Facility: CLINIC | Age: 55
End: 2025-07-03
Payer: COMMERCIAL

## 2025-07-03 DIAGNOSIS — R07.89 ATYPICAL CHEST PAIN: ICD-10-CM

## 2025-07-03 DIAGNOSIS — K76.0 FATTY LIVER: ICD-10-CM

## 2025-07-03 DIAGNOSIS — R00.2 HEART PALPITATIONS: Primary | ICD-10-CM

## 2025-07-03 DIAGNOSIS — Z71.3 WEIGHT LOSS COUNSELING, ENCOUNTER FOR: ICD-10-CM

## 2025-07-03 DIAGNOSIS — I10 HYPERTENSION GOAL BP (BLOOD PRESSURE) < 140/90: ICD-10-CM

## 2025-07-03 DIAGNOSIS — I77.1 SUBCLAVIAN ARTERY STENOSIS, LEFT: ICD-10-CM

## 2025-07-03 PROCEDURE — 98007 SYNCH AUDIO-VIDEO EST HI 40: CPT | Performed by: FAMILY MEDICINE

## 2025-07-03 NOTE — PATIENT INSTRUCTIONS
"Radha    It was a pleasure speaking with you today.  Here's the plan:    Stop chlorthalidone and take losartan 12.5mg once daily.  Continue to monitor pressures.  Update me with pressures in 1-2 weeks.  Cardiology referral ordered - Referred to Dr. Baptiste (pronounced \"michelle\")  New prescription for zepbound to help with weight loss and treat fatty liver.    Let me know if you have questions.    Den Beckwith MD    "

## 2025-07-03 NOTE — PROGRESS NOTES
Radha is a 54 year old who is being evaluated via a billable video visit.    How would you like to obtain your AVS? MyChart  If the video visit is dropped, the invitation should be resent by: Text to cell phone: [phone]  Will anyone else be joining your video visit? No  {If patient encounters technical issues they should call [phone] :962036}    {PROVIDER CHARTING PREFERENCE:443449}    Subjective   Radha is a 54 year old, presenting for the following health issues:  Hypertension      7/3/2025     5:19 PM   Additional Questions   Roomed by Leyla   Accompanied by none         7/3/2025     5:19 PM   Patient Reported Additional Medications   Patient reports taking the following new medications none     History of Present Illness       Hypertension: She presents for follow up of hypertension.  She does check blood pressure  regularly outside of the clinic. Outpatient blood pressures have not been over 140/90. She does not follow a low salt diet.     Vascular Disease:  She presents for follow up of vascular disease.     She never takes nitroglycerin. She takes daily aspirin.    She eats 0-1 servings of fruits and vegetables daily.She consumes 0 sweetened beverage(s) daily.She exercises with enough effort to increase her heart rate 9 or less minutes per day.  She exercises with enough effort to increase her heart rate 3 or less days per week.   She is taking medications regularly.          Chlorthalidone 12.5mg  Losartan   {SUPERLIST (Optional):275467}  {additonal problems for provider to add (Optional):119412}    {ROS Picklists (Optional):926294}      Objective         Wt Readings from Last 4 Encounters:   06/24/25 97.5 kg (215 lb)   06/05/25 97.5 kg (215 lb)   05/28/25 97.5 kg (215 lb)   05/19/25 95.8 kg (211 lb 3.2 oz)       Vitals:  No vitals were obtained today due to virtual visit.    Physical Exam   {video visit exam brief selected:327641}    {Diagnostic Test Results (Optional):206138}      Video-Visit  "Details    Type of service:  Video Visit   Originating Location (pt. Location): {video visit patient location:596946::\"Home\"}  {PROVIDER LOCATION On-site should be selected for visits conducted from your clinic location or adjoining [facility] hospital, academic office, or other nearby [facility] building. Off-site should be selected for all other provider locations, including home:579826}  Distant Location (provider location):  {virtual location provider:192244}  Platform used for Video Visit: {Virtual Visit Platforms:623796::\"LeanWagon\"}  Signed Electronically by: Den Beckwith MD  {Email feedback regarding this note to [email or web address]   :055247}  "

## 2025-07-05 ENCOUNTER — PATIENT OUTREACH (OUTPATIENT)
Dept: CARE COORDINATION | Facility: CLINIC | Age: 55
End: 2025-07-05
Payer: COMMERCIAL

## 2025-07-07 ENCOUNTER — TELEPHONE (OUTPATIENT)
Dept: CARDIOLOGY | Facility: CLINIC | Age: 55
End: 2025-07-07
Payer: COMMERCIAL

## 2025-07-07 ENCOUNTER — PATIENT OUTREACH (OUTPATIENT)
Dept: CARE COORDINATION | Facility: CLINIC | Age: 55
End: 2025-07-07
Payer: COMMERCIAL

## 2025-07-07 NOTE — TELEPHONE ENCOUNTER
From: Carolin Langford   Sent: 7/7/2025   2:52 PM CDT   To:  Cardiology   Subject: Heart palpitations [R00.2] Subclavian artery*     This encounter is being sent to inform the clinic that this patient has a referral from Dr. Key  for the diagnoses of   Heart palpitations [R00.2]   Subclavian artery stenosis, left      and has requested that this patient be seen within 30 days  and/or with Dr. Baptiste .  Based on the availability of our provider(s), we are unable to accommodate this request.    Were all sites offered this patient?   Yes     Does scheduling algorithm request to schedule next available?   Patient has been scheduled for the first available opening with Dr. Baptiste  on 9/10/25.  We have informed the patient that the clinic will review their referral and reach out if a sooner appointment is medically necessary.     Patient added to waitlist.    Jayashree Verma, RN, BSN  Cardiology RN Care Coordinator   [place]   Phone: [phone]  Fax: [phone] ([place]) [phone] ([place])

## 2025-07-13 ENCOUNTER — HEALTH MAINTENANCE LETTER (OUTPATIENT)
Age: 55
End: 2025-07-13

## 2025-07-15 ENCOUNTER — ANCILLARY PROCEDURE (OUTPATIENT)
Dept: MAMMOGRAPHY | Facility: CLINIC | Age: 55
End: 2025-07-15
Attending: OBSTETRICS & GYNECOLOGY
Payer: COMMERCIAL

## 2025-07-15 DIAGNOSIS — Z12.31 VISIT FOR SCREENING MAMMOGRAM: ICD-10-CM

## 2025-07-15 DIAGNOSIS — I10 HYPERTENSION GOAL BP (BLOOD PRESSURE) < 140/90: ICD-10-CM

## 2025-07-15 PROCEDURE — 77063 BREAST TOMOSYNTHESIS BI: CPT | Mod: GC | Performed by: STUDENT IN AN ORGANIZED HEALTH CARE EDUCATION/TRAINING PROGRAM

## 2025-07-15 PROCEDURE — 77067 SCR MAMMO BI INCL CAD: CPT | Mod: GC | Performed by: STUDENT IN AN ORGANIZED HEALTH CARE EDUCATION/TRAINING PROGRAM

## 2025-07-15 RX ORDER — LOSARTAN POTASSIUM 25 MG/1
25 TABLET ORAL DAILY
Qty: 30 TABLET | Refills: 1 | Status: SHIPPED | OUTPATIENT
Start: 2025-07-15

## 2025-07-16 ENCOUNTER — MYC MEDICAL ADVICE (OUTPATIENT)
Dept: FAMILY MEDICINE | Facility: CLINIC | Age: 55
End: 2025-07-16
Payer: COMMERCIAL

## 2025-07-16 DIAGNOSIS — R00.2 HEART PALPITATIONS: ICD-10-CM

## 2025-07-16 DIAGNOSIS — I77.1 SUBCLAVIAN ARTERY STENOSIS, LEFT: Primary | ICD-10-CM

## 2025-07-16 DIAGNOSIS — R07.89 ATYPICAL CHEST PAIN: ICD-10-CM

## 2025-07-16 NOTE — TELEPHONE ENCOUNTER
Dr. Key, Cardiology cannot see patient until September 10th, can we change priority? She also reports that she could barely breath over the weekend and was having a lot of pain. She is doing better today. Informed pt to go to ED/UC if this happened again, she wont go as she's been to ED multiple times and they tell her to see a cardiologist as they can't help.    Shoshana Flaherty RN on 7/16/2025 at 12:17 PM

## 2025-07-22 ENCOUNTER — OFFICE VISIT (OUTPATIENT)
Dept: CARDIOLOGY | Facility: CLINIC | Age: 55
End: 2025-07-22
Attending: FAMILY MEDICINE
Payer: COMMERCIAL

## 2025-07-22 VITALS
SYSTOLIC BLOOD PRESSURE: 130 MMHG | WEIGHT: 218 LBS | BODY MASS INDEX: 35.19 KG/M2 | HEART RATE: 90 BPM | DIASTOLIC BLOOD PRESSURE: 89 MMHG | OXYGEN SATURATION: 97 %

## 2025-07-22 DIAGNOSIS — I10 HYPERTENSION GOAL BP (BLOOD PRESSURE) < 140/90: ICD-10-CM

## 2025-07-22 DIAGNOSIS — I77.1 SUBCLAVIAN ARTERY STENOSIS, LEFT: ICD-10-CM

## 2025-07-22 DIAGNOSIS — R07.89 ATYPICAL CHEST PAIN: ICD-10-CM

## 2025-07-22 DIAGNOSIS — R00.2 HEART PALPITATIONS: ICD-10-CM

## 2025-07-22 NOTE — LETTER
7/22/2025      RE: Radha Crockett  [address]  [place] [address]       Dear Colleague,    Thank you for the opportunity to participate in the care of your patient, Radha Crockett, at the [facility] HEART [facility]IN[facility]. Please see a copy of my visit note below.                                                                                                   [facility]iology [facility]           Referring provider:    HPI: Ms. Radha Crockett is a 54 year old  female with PMH significant for Obesity BMI 35, HTN, Hyperlpidemia    Comes for eval of left subclavian stenosis - moderate-severe on a recent CTA Chest abd. She started having chest pains 2-3 months ago - substernal, often feels her heart race with palpitations.... went to ER multiple times and had multiple cardiac tests,,  Denies orthopnea, PND. She had Echo that was normal .. She had nuclear stress test which was normal followed by a coronary CTA which showed 0 calcium score and no flow limiting atherosclerotic CAD in coronaries. Continues to have chest discomfort and is very anxious about the heart    She used Ozempic in the past and lost 20 lb but regained most of the weight, now will be placed on Mounjaro soon      Medications, personal, family, and social history reviewed with patient and revised.    PAST MEDICAL HISTORY:  Past Medical History:   Diagnosis Date     Bilateral chronic vestibular neuritis 12/31/2020     MARCO ANTONIO (generalized anxiety disorder)      Hepatic steatosis      Hiatal hernia      High cholesterol      Hypertension goal BP (blood pressure) < 140/90      Recurrent miscarriages 01/10/2012       CURRENT MEDICATIONS:  Current Outpatient Medications   Medication Sig Dispense Refill     aspirin (ASA) 81 MG chewable tablet Take 81 mg by mouth daily.       atorvastatin (LIPITOR) 40 MG tablet Take 40 mg by mouth daily.       losartan (COZAAR) 25 MG tablet TAKE 1  TABLET(25 MG) BY MOUTH DAILY 30 tablet 1     melatonin 1 MG TABS tablet Take 1 mg by mouth nightly as needed for sleep.       tirzepatide-Weight Management (ZEPBOUND) 2.5 MG/0.5ML prefilled pen Inject 0.5 mLs (2.5 mg) subcutaneously every 7 days. 2 mL 1     Turmeric 5-1000 MG CAPS Take 500 mg by mouth daily.       Vitamin D-Vitamin K (VITAMIN K2-VITAMIN D3)  MCG CAPS Take 1,000 mg by mouth daily.         PAST SURGICAL HISTORY:  Past Surgical History:   Procedure Laterality Date     NOSE SURGERY  12/2009    cosmetic reasons       ALLERGIES:     Allergies   Allergen Reactions     Clomid [Clomiphene Citrate] Other (See Comments)     Presumed Ovarian hyperstimulation       FAMILY HISTORY:  Family History   Problem Relation Age of Onset     Gastrointestinal Disease Mother         GI bleeding, source unknown     Vascular Disease Mother         heart issue?, bad kidney     Myocardial Infarction Father         at age 63 years      Diabetes Father      Hyperlipidemia Brother      Leukemia Paternal Aunt          SOCIAL HISTORY:  Social History     Tobacco Use     Smoking status: Former     Current packs/day: 0.00     Average packs/day: 0.5 packs/day for 20.0 years (10.0 ttl pk-yrs)     Types: Cigarettes     Start date: 7/12/1990     Quit date: 7/12/2010     Years since quitting: 15.0     Passive exposure: Never     Smokeless tobacco: Former     Quit date: 12/3/2010   Vaping Use     Vaping status: Never Used   Substance Use Topics     Alcohol use: Yes     Alcohol/week: 0.0 standard drinks of alcohol     Comment: seldom use     Drug use: No       ROS:   Constitutional: No fever, chills, or sweats. Weight stable.   Cardiovascular: As per HPI.       Exam:  /89 (BP Location: Left arm, Patient Position: Chair, Cuff Size: Adult Regular)   Pulse 90   Wt 98.9 kg (218 lb)   LMP 03/06/2025 (Exact Date)   SpO2 97%   BMI 35.19 kg/m    GENERAL APPEARANCE: alert and no distress  HEENT: no icterus, no central  cyanosis  LYMPH/NECK: no adenopathy, no asymmetry, JVP not elevated, no carotid bruits.  RESPIRATORY: lungs clear to auscultation - no rales, rhonchi or wheezes, no use of accessory muscles, no retractions, respirations are unlabored, normal respiratory rate  CARDIOVASCULAR: regular rhythm, normal S1, S2, no S3 or S4 and no murmur, click or rub, precordium quiet with normal PMI.  GI: soft, non tender  EXTREMITIES: peripheral pulses normal, no edema  NEURO: alert, normal speech,and affect  SKIN: no ecchymoses, no rashes     I have reviewed the labs and personally reviewed the imaging below and made my comment in the assessment and plan.    Labs:  CBC RESULTS:   Lab Results   Component Value Date    WBC 8.6 05/19/2023    WBC 7.2 08/11/2015    RBC 4.81 05/19/2023    RBC 4.58 08/11/2015    HGB 13.5 05/19/2023    HGB 13.2 08/11/2015    HCT 41.2 05/19/2023    HCT 40.2 08/11/2015    MCV 86 05/19/2023    MCV 88 08/11/2015    MCH 28.1 05/19/2023    MCH 28.8 08/11/2015    MCHC 32.8 05/19/2023    MCHC 32.8 08/11/2015    RDW 13.9 05/19/2023    RDW 13.2 08/11/2015     05/19/2023     08/11/2015       BMP RESULTS:  Lab Results   Component Value Date     05/28/2025     10/26/2017    POTASSIUM 4.0 05/28/2025    POTASSIUM 5.2 09/21/2021    POTASSIUM 4.5 10/26/2017    CHLORIDE 100 05/28/2025    CHLORIDE 109 09/21/2021    CHLORIDE 103 10/26/2017    CO2 29 05/28/2025    CO2 28 09/21/2021    CO2 29 10/26/2017    ANIONGAP 12 05/28/2025    ANIONGAP 3 09/21/2021    ANIONGAP 5 10/26/2017     (H) 05/28/2025    GLC 99 09/21/2021     (H) 10/26/2017    BUN 17.4 05/28/2025    BUN 18 09/21/2021    BUN 20 10/26/2017    CR 0.85 05/28/2025    CR 0.79 10/26/2017    GFRESTIMATED 81 05/28/2025    GFRESTIMATED 78 10/26/2017    GFRESTBLACK >90 10/26/2017    KARINE 10.2 05/28/2025    KARINE 9.2 10/26/2017            Echocardiogram  No results found for this or any previous visit (from the past 8760 hours).    ECHO  5/14/25  Left ventricular size, wall motion and function are normal. The ejection  fraction is 60-65%.  Right ventricular function, chamber size, wall motion, and thickness are  normal.  No significant valvular abnormalities present.    NUCLEAR STRESS TEST 5/19/25         The nuclear stress test is negative for inducible myocardial ischemia or infarction.     Left ventricular function is hyperdynamic.     The left ventricular ejection fraction at rest is greater than 70%. The left ventricular ejection fraction at stress is greater than 70%.     There is no prior study for comparison.    CORONARY CTA 6/4/25    Native Coronaries:       1. Normal epicardial coronary arteries without atherosclerosis.     STUDY QUALITY: Study quality is excellent.     CAD-RADS: CAD-RADS Classification 0 (0% stenosis).     CALCIUM SCORING: Total coronary artery calcium score 0.     DOMINANCE: Right dominant coronary artery system.     LM: The LM is normal.     LAD: The LAD is normal.     D1: The first diagonal is normal.     RAMUS: The ramus is normal.     LCX: The LCx is normal.     OM1: The first obtuse marginal is normal.     OM2: The second obtuse marginal is normal.     RCA: The RCA is normal.     RIGHT PDA: The right PDA is normal.     RIGHT PLB: The right posterolateral branch is normal.     OTHER FINDINGS: Asc Ao 30 x 3                 Assessment and Plan:       1) Chest pain - likely atypical chest pain, associated with palpitations -will get Ziopatch heart monitor to r/o PVCs or dysrhythmias  Her echo, nuc stress test and coronary CTA are normal and reviewed with the patient    2) Left subclavian stenosis - has minimal left arm stiffness and aches, will continue conservative management with ASA/Atorvastatin, recent . Total ASCVD risk 3.7%    3) mixed hyperlipidemia. LDL goal <100 , continue Aspirin and high dose Atorvastatin 40 mg daily, denies myalgias    4) obesity  -discussed in detail about dietary changes that would  benefit her. Advised 10-20 lb weight loss, may consider Mounjaro or Ozempic. Advised to lower carb/sugar intake    F/up in 6mo with lipid panel        Total time spent today for this visit is _ minutes including precharting, face-to-face clinic visit, review of labs/imaging and medical documentation.    Ta Hernandez MD [facility] Cardiology  [facility]      Please do not hesitate to contact me if you have any questions/concerns.     Sincerely,     Ta Hernandez MD

## 2025-07-22 NOTE — NURSING NOTE
Radha Crockett arrived here on 7/22/2025 4:03 PM for 3-7 Days  Zio monitor placement per ordering provider Dr Hernandez for the diagnosis palpitations.  Patient s skin was prepped per protocol. Dr. Hernandez is the supervising MD.  Zio monitor was placed.  Instructions were reviewed with and given to the patient.  Patient verbalized understanding of wear, troubleshooting and monitor return instructions.

## 2025-07-22 NOTE — PATIENT INSTRUCTIONS
Thank you for coming to the [facility] @ [place]; please note the following instructions:    1. Zio heart monitor to be worn for 7 days       We have applied a heart monitor for you to wear for 7 days.  You may remove the heart monitor on 7/29/25.  Please see the enclosed instructions for further information, as well as instructions for removal of the heart monitor and return mailing directions.  If you should have questions regarding your monitor, please call [employer] at [phone].  The Cardiology Nurse will contact you with your results (please see result notification details at bottom of summary).    *PLEASE DO NOT SHOWER OR INDUCE EXCESSIVE SWEATING IN THE FIRST 24 HOURS OF WEARING*     If you have any questions regarding your visit please contact your care team:     Cardiology  Telephone Number   Agustina RAY, RN  Jayashree CAI, RN  Nilsa JONES, RN  Roxanne GRANADOS, RMDONALD MCCLOUD, RMA  Lisa MCGHEE, Clinic Facilitator  Elin CAI, Clinic Facilitator [phone] (option 1)   For scheduling appts:     [phone] (select option 1)       For the Device Clinic (Pacemakers and ICD's)  RN's :  Diamond Spears   During business hours: [phone]    *After business hours:  [phone] (select option 4)      Normal test result notifications will be released via [employer] or mailed within 7 business days.  All other test results, will be communicated via telephone once reviewed by your cardiologist.    If you need a medication refill please contact your pharmacy.  Please allow 3 business days for your refill to be completed.    As always, thank you for trusting us with your health care needs!

## 2025-07-22 NOTE — NURSING NOTE
"Chief Complaint   Patient presents with    New Patient     Subclavian artery stenosis       Initial /89 (BP Location: Left arm, Patient Position: Chair, Cuff Size: Adult Regular)   Pulse 90   Wt 98.9 kg (218 lb)   LMP 03/06/2025 (Exact Date)   SpO2 97%   BMI 35.19 kg/m   Estimated body mass index is 35.19 kg/m  as calculated from the following:    Height as of 6/24/25: 1.676 m (5' 6\").    Weight as of this encounter: 98.9 kg (218 lb)..  BP completed using cuff size: regular    Elin Oglesby, Visit Facilitator    "

## 2025-07-22 NOTE — PROGRESS NOTES
General Cardiology Clinic[facility]           Referring provider:    HPI: Ms. Radha Crockett is a 54 year old  female with PMH significant for Obesity BMI 35, HTN, Hyperlpidemia    Comes for eval of left subclavian stenosis - moderate-severe on a recent CTA Chest abd. She started having chest pains 2-3 months ago - substernal, often feels her heart race with palpitations.... went to ER multiple times and had multiple cardiac tests,,  Denies orthopnea, PND. She had Echo that was normal .. She had nuclear stress test which was normal followed by a coronary CTA which showed 0 calcium score and no flow limiting atherosclerotic CAD in coronaries. Continues to have chest discomfort and is very anxious about the heart    She used Ozempic in the past and lost 20 lb but regained most of the weight, now will be placed on Mounjaro soon      Medications, personal, family, and social history reviewed with patient and revised.    PAST MEDICAL HISTORY:  Past Medical History:   Diagnosis Date    Bilateral chronic vestibular neuritis 12/31/2020    MARCO ANTONIO (generalized anxiety disorder)     Hepatic steatosis     Hiatal hernia     High cholesterol     Hypertension goal BP (blood pressure) < 140/90     Recurrent miscarriages 01/10/2012       CURRENT MEDICATIONS:  Current Outpatient Medications   Medication Sig Dispense Refill    aspirin (ASA) 81 MG chewable tablet Take 81 mg by mouth daily.      atorvastatin (LIPITOR) 40 MG tablet Take 40 mg by mouth daily.      losartan (COZAAR) 25 MG tablet TAKE 1 TABLET(25 MG) BY MOUTH DAILY 30 tablet 1    melatonin 1 MG TABS tablet Take 1 mg by mouth nightly as needed for sleep.      tirzepatide-Weight Management (ZEPBOUND) 2.5 MG/0.5ML prefilled pen Inject 0.5 mLs (2.5 mg) subcutaneously every 7 days. 2 mL 1    Turmeric 5-1000 MG CAPS Take 500 mg by mouth daily.      Vitamin D-Vitamin K (VITAMIN K2-VITAMIN D3)   MCG CAPS Take 1,000 mg by mouth daily.         PAST SURGICAL HISTORY:  Past Surgical History:   Procedure Laterality Date    NOSE SURGERY  12/2009    cosmetic reasons       ALLERGIES:     Allergies   Allergen Reactions    Clomid [Clomiphene Citrate] Other (See Comments)     Presumed Ovarian hyperstimulation       FAMILY HISTORY:  Family History   Problem Relation Age of Onset    Gastrointestinal Disease Mother         GI bleeding, source unknown    Vascular Disease Mother         heart issue?, bad kidney    Myocardial Infarction Father         at age 63 years     Diabetes Father     Hyperlipidemia Brother     Leukemia Paternal Aunt          SOCIAL HISTORY:  Social History     Tobacco Use    Smoking status: Former     Current packs/day: 0.00     Average packs/day: 0.5 packs/day for 20.0 years (10.0 ttl pk-yrs)     Types: Cigarettes     Start date: 7/12/1990     Quit date: 7/12/2010     Years since quitting: 15.0     Passive exposure: Never    Smokeless tobacco: Former     Quit date: 12/3/2010   Vaping Use    Vaping status: Never Used   Substance Use Topics    Alcohol use: Yes     Alcohol/week: 0.0 standard drinks of alcohol     Comment: seldom use    Drug use: No       ROS:   Constitutional: No fever, chills, or sweats. Weight stable.   Cardiovascular: As per HPI.       Exam:  /89 (BP Location: Left arm, Patient Position: Chair, Cuff Size: Adult Regular)   Pulse 90   Wt 98.9 kg (218 lb)   LMP 03/06/2025 (Exact Date)   SpO2 97%   BMI 35.19 kg/m    GENERAL APPEARANCE: alert and no distress  HEENT: no icterus, no central cyanosis  LYMPH/NECK: no adenopathy, no asymmetry, JVP not elevated, no carotid bruits.  RESPIRATORY: lungs clear to auscultation - no rales, rhonchi or wheezes, no use of accessory muscles, no retractions, respirations are unlabored, normal respiratory rate  CARDIOVASCULAR: regular rhythm, normal S1, S2, no S3 or S4 and no murmur, click or rub, precordium quiet with normal PMI.  GI:  soft, non tender  EXTREMITIES: peripheral pulses normal, no edema  NEURO: alert, normal speech,and affect  SKIN: no ecchymoses, no rashes     I have reviewed the labs and personally reviewed the imaging below and made my comment in the assessment and plan.    Labs:  CBC RESULTS:   Lab Results   Component Value Date    WBC 8.6 05/19/2023    WBC 7.2 08/11/2015    RBC 4.81 05/19/2023    RBC 4.58 08/11/2015    HGB 13.5 05/19/2023    HGB 13.2 08/11/2015    HCT 41.2 05/19/2023    HCT 40.2 08/11/2015    MCV 86 05/19/2023    MCV 88 08/11/2015    MCH 28.1 05/19/2023    MCH 28.8 08/11/2015    MCHC 32.8 05/19/2023    MCHC 32.8 08/11/2015    RDW 13.9 05/19/2023    RDW 13.2 08/11/2015     05/19/2023     08/11/2015       BMP RESULTS:  Lab Results   Component Value Date     05/28/2025     10/26/2017    POTASSIUM 4.0 05/28/2025    POTASSIUM 5.2 09/21/2021    POTASSIUM 4.5 10/26/2017    CHLORIDE 100 05/28/2025    CHLORIDE 109 09/21/2021    CHLORIDE 103 10/26/2017    CO2 29 05/28/2025    CO2 28 09/21/2021    CO2 29 10/26/2017    ANIONGAP 12 05/28/2025    ANIONGAP 3 09/21/2021    ANIONGAP 5 10/26/2017     (H) 05/28/2025    GLC 99 09/21/2021     (H) 10/26/2017    BUN 17.4 05/28/2025    BUN 18 09/21/2021    BUN 20 10/26/2017    CR 0.85 05/28/2025    CR 0.79 10/26/2017    GFRESTIMATED 81 05/28/2025    GFRESTIMATED 78 10/26/2017    GFRESTBLACK >90 10/26/2017    KARINE 10.2 05/28/2025    KARINE 9.2 10/26/2017            Echocardiogram  No results found for this or any previous visit (from the past 8760 hours).    ECHO 5/14/25  Left ventricular size, wall motion and function are normal. The ejection  fraction is 60-65%.  Right ventricular function, chamber size, wall motion, and thickness are  normal.  No significant valvular abnormalities present.    NUCLEAR STRESS TEST 5/19/25        The nuclear stress test is negative for inducible myocardial ischemia or infarction.    Left ventricular function is  hyperdynamic.    The left ventricular ejection fraction at rest is greater than 70%. The left ventricular ejection fraction at stress is greater than 70%.    There is no prior study for comparison.    CORONARY CTA 6/4/25    Native Coronaries:       1. Normal epicardial coronary arteries without atherosclerosis.     STUDY QUALITY: Study quality is excellent.     CAD-RADS: CAD-RADS Classification 0 (0% stenosis).     CALCIUM SCORING: Total coronary artery calcium score 0.     DOMINANCE: Right dominant coronary artery system.     LM: The LM is normal.     LAD: The LAD is normal.     D1: The first diagonal is normal.     RAMUS: The ramus is normal.     LCX: The LCx is normal.     OM1: The first obtuse marginal is normal.     OM2: The second obtuse marginal is normal.     RCA: The RCA is normal.     RIGHT PDA: The right PDA is normal.     RIGHT PLB: The right posterolateral branch is normal.     OTHER FINDINGS: Asc Ao 30 x 3                 Assessment and Plan:       1) Chest pain - likely atypical chest pain, associated with palpitations -will get Ziopatch heart monitor to r/o PVCs or dysrhythmias  Her echo, nuc stress test and coronary CTA are normal and reviewed with the patient    2) Left subclavian stenosis - has minimal left arm stiffness and aches, will continue conservative management with ASA/Atorvastatin, recent . Total ASCVD risk 3.7%    3) mixed hyperlipidemia. LDL goal <100 , continue Aspirin and high dose Atorvastatin 40 mg daily, denies myalgias    4) obesity  -discussed in detail about dietary changes that would benefit her. Advised 10-20 lb weight loss, may consider Mounjaro or Ozempic. Advised to lower carb/sugar intake    F/up in 6mo with lipid panel        Total time spent today for this visit is _ minutes including precharting, face-to-face clinic visit, review of labs/imaging and medical documentation.    Ta Hernandez MD [facility] Cardiology  [facility]

## 2025-07-31 LAB — CV ZIO PRELIM RESULTS: NORMAL

## 2025-08-02 LAB — CV ZIO PRELIM RESULTS: NORMAL

## 2025-08-31 ENCOUNTER — MYC REFILL (OUTPATIENT)
Dept: FAMILY MEDICINE | Facility: CLINIC | Age: 55
End: 2025-08-31
Payer: COMMERCIAL

## 2025-08-31 DIAGNOSIS — E78.5 HYPERLIPIDEMIA LDL GOAL <100: Primary | ICD-10-CM

## 2025-09-02 RX ORDER — ATORVASTATIN CALCIUM 40 MG/1
40 TABLET, FILM COATED ORAL DAILY
Qty: 90 TABLET | Refills: 1 | Status: SHIPPED | OUTPATIENT
Start: 2025-09-02

## (undated) RX ORDER — REGADENOSON 0.08 MG/ML
INJECTION, SOLUTION INTRAVENOUS
Status: DISPENSED
Start: 2025-05-19